# Patient Record
Sex: FEMALE | Race: WHITE | Employment: OTHER | URBAN - NONMETROPOLITAN AREA
[De-identification: names, ages, dates, MRNs, and addresses within clinical notes are randomized per-mention and may not be internally consistent; named-entity substitution may affect disease eponyms.]

---

## 2017-01-05 ENCOUNTER — TELEPHONE (OUTPATIENT)
Age: 72
End: 2017-01-05

## 2017-01-17 ENCOUNTER — OFFICE VISIT (OUTPATIENT)
Age: 72
End: 2017-01-17

## 2017-01-17 VITALS
TEMPERATURE: 98.1 F | HEART RATE: 72 BPM | HEIGHT: 62 IN | SYSTOLIC BLOOD PRESSURE: 124 MMHG | DIASTOLIC BLOOD PRESSURE: 78 MMHG | BODY MASS INDEX: 34.6 KG/M2 | WEIGHT: 188 LBS | RESPIRATION RATE: 18 BRPM

## 2017-01-17 DIAGNOSIS — Z12.11 COLON CANCER SCREENING: Primary | ICD-10-CM

## 2017-01-17 DIAGNOSIS — Z86.010 PERSONAL HISTORY OF COLONIC POLYPS: ICD-10-CM

## 2017-01-17 PROCEDURE — 99999 PR OFFICE/OUTPT VISIT,PROCEDURE ONLY: CPT | Performed by: SURGERY

## 2017-01-17 PROCEDURE — 1036F TOBACCO NON-USER: CPT | Performed by: SURGERY

## 2017-01-17 ASSESSMENT — ENCOUNTER SYMPTOMS
WHEEZING: 0
VOMITING: 0
RECTAL PAIN: 0
ANAL BLEEDING: 0
SHORTNESS OF BREATH: 0
COLOR CHANGE: 0
DIARRHEA: 0
CHOKING: 0
EYE DISCHARGE: 0
ABDOMINAL DISTENTION: 0
FACIAL SWELLING: 0
BLOOD IN STOOL: 0
CHEST TIGHTNESS: 0
EYE REDNESS: 0
BACK PAIN: 0
ABDOMINAL PAIN: 0
PHOTOPHOBIA: 0
APNEA: 0
CONSTIPATION: 0
STRIDOR: 0
EYE ITCHING: 0
EYE PAIN: 0
SINUS PRESSURE: 0
VOICE CHANGE: 0
SORE THROAT: 0
NAUSEA: 0
TROUBLE SWALLOWING: 0
RHINORRHEA: 0
COUGH: 0

## 2017-01-19 DIAGNOSIS — E78.00 PURE HYPERCHOLESTEROLEMIA: Primary | ICD-10-CM

## 2017-01-19 RX ORDER — ROSUVASTATIN CALCIUM 20 MG/1
TABLET, COATED ORAL
Qty: 90 TABLET | Refills: 0 | OUTPATIENT
Start: 2017-01-19

## 2017-01-27 ENCOUNTER — OFFICE VISIT (OUTPATIENT)
Dept: FAMILY MEDICINE CLINIC | Age: 72
End: 2017-01-27

## 2017-01-27 VITALS
HEART RATE: 68 BPM | DIASTOLIC BLOOD PRESSURE: 70 MMHG | HEIGHT: 62 IN | SYSTOLIC BLOOD PRESSURE: 128 MMHG | RESPIRATION RATE: 16 BRPM | WEIGHT: 186.13 LBS | BODY MASS INDEX: 34.25 KG/M2

## 2017-01-27 DIAGNOSIS — E78.00 PURE HYPERCHOLESTEROLEMIA: Primary | ICD-10-CM

## 2017-01-27 PROCEDURE — 99214 OFFICE O/P EST MOD 30 MIN: CPT | Performed by: FAMILY MEDICINE

## 2017-01-27 RX ORDER — ROSUVASTATIN CALCIUM 20 MG/1
TABLET, COATED ORAL
Qty: 90 TABLET | Refills: 3 | Status: SHIPPED | OUTPATIENT
Start: 2017-01-27 | End: 2018-01-22 | Stop reason: SDUPTHER

## 2017-01-27 ASSESSMENT — ENCOUNTER SYMPTOMS
SINUS PRESSURE: 0
CONSTIPATION: 0
SHORTNESS OF BREATH: 0

## 2017-01-27 ASSESSMENT — PATIENT HEALTH QUESTIONNAIRE - PHQ9
SUM OF ALL RESPONSES TO PHQ9 QUESTIONS 1 & 2: 0
1. LITTLE INTEREST OR PLEASURE IN DOING THINGS: 0
2. FEELING DOWN, DEPRESSED OR HOPELESS: 0
SUM OF ALL RESPONSES TO PHQ QUESTIONS 1-9: 0

## 2017-07-25 ENCOUNTER — TELEPHONE (OUTPATIENT)
Dept: FAMILY MEDICINE CLINIC | Age: 72
End: 2017-07-25

## 2017-07-25 DIAGNOSIS — E78.00 PURE HYPERCHOLESTEROLEMIA: ICD-10-CM

## 2017-07-25 DIAGNOSIS — E78.00 PURE HYPERCHOLESTEROLEMIA: Primary | ICD-10-CM

## 2017-07-25 LAB
CHOLESTEROL, TOTAL: NORMAL MG/DL
CHOLESTEROL/HDL RATIO: NORMAL
HDLC SERPL-MCNC: NORMAL MG/DL (ref 35–70)
LDL CHOLESTEROL CALCULATED: 75 MG/DL (ref 0–160)
TRIGL SERPL-MCNC: NORMAL MG/DL
VLDLC SERPL CALC-MCNC: NORMAL MG/DL

## 2018-01-11 DIAGNOSIS — E78.00 PURE HYPERCHOLESTEROLEMIA: ICD-10-CM

## 2018-01-11 LAB — LDL CHOLESTEROL DIRECT: 86 MG/DL

## 2018-01-15 ENCOUNTER — TELEPHONE (OUTPATIENT)
Dept: FAMILY MEDICINE CLINIC | Age: 73
End: 2018-01-15

## 2018-01-15 NOTE — TELEPHONE ENCOUNTER
----- Message from Antonio Otero MD sent at 1/14/2018  8:27 PM EST -----  Let her know that the labs looked well but I would like to see her this month still for a check up

## 2018-01-17 ENCOUNTER — HOSPITAL ENCOUNTER (OUTPATIENT)
Dept: WOMENS IMAGING | Age: 73
Discharge: HOME | End: 2018-01-17
Payer: MEDICARE

## 2018-01-17 DIAGNOSIS — Z12.31 VISIT FOR SCREENING MAMMOGRAM: ICD-10-CM

## 2018-01-17 PROCEDURE — 77063 BREAST TOMOSYNTHESIS BI: CPT

## 2018-01-22 ENCOUNTER — OFFICE VISIT (OUTPATIENT)
Dept: FAMILY MEDICINE CLINIC | Age: 73
End: 2018-01-22

## 2018-01-22 VITALS
RESPIRATION RATE: 16 BRPM | BODY MASS INDEX: 32.23 KG/M2 | SYSTOLIC BLOOD PRESSURE: 142 MMHG | HEART RATE: 68 BPM | HEIGHT: 62 IN | DIASTOLIC BLOOD PRESSURE: 72 MMHG | WEIGHT: 175.13 LBS

## 2018-01-22 DIAGNOSIS — Z78.0 POSTMENOPAUSAL: ICD-10-CM

## 2018-01-22 DIAGNOSIS — E78.00 PURE HYPERCHOLESTEROLEMIA: ICD-10-CM

## 2018-01-22 DIAGNOSIS — R00.2 PALPITATION: Primary | ICD-10-CM

## 2018-01-22 DIAGNOSIS — R55 SYNCOPE, UNSPECIFIED SYNCOPE TYPE: ICD-10-CM

## 2018-01-22 PROCEDURE — 99214 OFFICE O/P EST MOD 30 MIN: CPT | Performed by: FAMILY MEDICINE

## 2018-01-22 PROCEDURE — 93000 ELECTROCARDIOGRAM COMPLETE: CPT | Performed by: FAMILY MEDICINE

## 2018-01-22 RX ORDER — ROSUVASTATIN CALCIUM 20 MG/1
TABLET, COATED ORAL
Qty: 90 TABLET | Refills: 3 | OUTPATIENT
Start: 2018-01-22

## 2018-01-22 RX ORDER — ROSUVASTATIN CALCIUM 20 MG/1
TABLET, COATED ORAL
Qty: 90 TABLET | Refills: 3 | Status: SHIPPED | OUTPATIENT
Start: 2018-01-22 | End: 2018-12-06 | Stop reason: SDUPTHER

## 2018-01-22 ASSESSMENT — PATIENT HEALTH QUESTIONNAIRE - PHQ9
2. FEELING DOWN, DEPRESSED OR HOPELESS: 1
1. LITTLE INTEREST OR PLEASURE IN DOING THINGS: 1
SUM OF ALL RESPONSES TO PHQ QUESTIONS 1-9: 2
SUM OF ALL RESPONSES TO PHQ9 QUESTIONS 1 & 2: 2

## 2018-01-22 ASSESSMENT — ENCOUNTER SYMPTOMS
CONSTIPATION: 0
SHORTNESS OF BREATH: 0
SINUS PRESSURE: 0

## 2018-01-22 NOTE — PROGRESS NOTES
Subjective:      Patient ID: María Elena Lewis is a 71 y.o. female. HPI  1. She states having about the same number of palpitation feelings as in the last 17 years and will black out everyother month or so. 2. She is interested in seeing a cardiologist  Review of Systems   Constitutional: Negative for fatigue. HENT: Negative for sinus pressure. Eyes: Negative for visual disturbance. Respiratory: Negative for shortness of breath. Cardiovascular: Positive for palpitations. Negative for chest pain. Gastrointestinal: Negative for constipation. Genitourinary: Negative. Musculoskeletal: Positive for arthralgias. Skin: Negative for rash. Neurological: Positive for syncope. Negative for headaches. The patient's medications, allergies, past medical problems, surgical, social, and family histories were reviewed and updated as needed. Objective:   Physical Exam   Constitutional: She is oriented to person, place, and time. She appears well-developed and well-nourished. No distress. HENT:   Head: Normocephalic and atraumatic. Right Ear: External ear normal.   Left Ear: External ear normal.   Nose: Nose normal.   Mouth/Throat: Oropharynx is clear and moist. No oropharyngeal exudate. Eyes: Conjunctivae are normal. No scleral icterus. Neck: Neck supple. Carotid bruit is not present. No tracheal deviation present. No thyromegaly present. Cardiovascular: Normal rate, regular rhythm, normal heart sounds and intact distal pulses. Pulmonary/Chest: Effort normal and breath sounds normal.   Abdominal: Soft. Bowel sounds are normal. She exhibits no mass. Musculoskeletal: She exhibits no edema. Lymphadenopathy:     She has no cervical adenopathy. Neurological: She is alert and oriented to person, place, and time. Skin: Skin is warm and dry. Psychiatric: She has a normal mood and affect. Her behavior is normal.   Blood pressure (!) 142/72, pulse 68, resp.  rate 16, height 5' 2\" (1.575

## 2018-01-25 DIAGNOSIS — R00.2 PALPITATION: ICD-10-CM

## 2018-01-29 ENCOUNTER — TELEPHONE (OUTPATIENT)
Dept: FAMILY MEDICINE CLINIC | Age: 73
End: 2018-01-29

## 2018-01-29 NOTE — TELEPHONE ENCOUNTER
----- Message from Angela Vallejo MD sent at 1/27/2018  8:42 PM EST -----  Let her know the labs were fine

## 2018-02-01 ENCOUNTER — HOSPITAL ENCOUNTER (OUTPATIENT)
Dept: WOMENS IMAGING | Age: 73
Discharge: HOME | End: 2018-02-01
Payer: MEDICARE

## 2018-02-01 DIAGNOSIS — Z78.0 POSTMENOPAUSAL: ICD-10-CM

## 2018-02-01 PROCEDURE — 77080 DXA BONE DENSITY AXIAL: CPT

## 2018-02-18 DIAGNOSIS — M85.89 OSTEOPENIA OF MULTIPLE SITES: Primary | ICD-10-CM

## 2018-02-18 PROBLEM — M85.80 OSTEOPENIA: Status: ACTIVE | Noted: 2018-02-01

## 2018-02-19 ENCOUNTER — TELEPHONE (OUTPATIENT)
Dept: FAMILY MEDICINE CLINIC | Age: 73
End: 2018-02-19

## 2018-02-19 NOTE — TELEPHONE ENCOUNTER
Pt is in [facility] will be back 2 nd wk march will  lab slip and sched appt then lab slip at front

## 2018-03-06 DIAGNOSIS — M85.89 OSTEOPENIA OF MULTIPLE SITES: ICD-10-CM

## 2018-03-07 ENCOUNTER — HOSPITAL ENCOUNTER (OUTPATIENT)
Dept: NON INVASIVE DIAGNOSTICS | Age: 73
Discharge: HOME | End: 2018-03-07
Payer: MEDICARE

## 2018-03-07 DIAGNOSIS — R55 SYNCOPE, UNSPECIFIED SYNCOPE TYPE: ICD-10-CM

## 2018-03-07 LAB
LV EF: 50 %
LVEF MODALITY: NORMAL

## 2018-03-07 PROCEDURE — 93270 REMOTE 30 DAY ECG REV/REPORT: CPT

## 2018-03-07 PROCEDURE — 93226 XTRNL ECG REC<48 HR SCAN A/R: CPT

## 2018-03-07 PROCEDURE — 93225 XTRNL ECG REC<48 HRS REC: CPT

## 2018-03-07 PROCEDURE — 93306 TTE W/DOPPLER COMPLETE: CPT

## 2018-03-08 ENCOUNTER — OFFICE VISIT (OUTPATIENT)
Dept: FAMILY MEDICINE CLINIC | Age: 73
End: 2018-03-08

## 2018-03-08 VITALS
RESPIRATION RATE: 16 BRPM | HEIGHT: 62 IN | HEART RATE: 68 BPM | BODY MASS INDEX: 32.2 KG/M2 | DIASTOLIC BLOOD PRESSURE: 60 MMHG | WEIGHT: 175 LBS | SYSTOLIC BLOOD PRESSURE: 120 MMHG

## 2018-03-08 DIAGNOSIS — M85.89 OSTEOPENIA OF MULTIPLE SITES: Primary | ICD-10-CM

## 2018-03-08 DIAGNOSIS — E55.9 VITAMIN D DEFICIENCY: ICD-10-CM

## 2018-03-08 DIAGNOSIS — E78.00 PURE HYPERCHOLESTEROLEMIA: ICD-10-CM

## 2018-03-08 PROCEDURE — 99213 OFFICE O/P EST LOW 20 MIN: CPT | Performed by: FAMILY MEDICINE

## 2018-03-08 ASSESSMENT — ENCOUNTER SYMPTOMS
CONSTIPATION: 0
SHORTNESS OF BREATH: 0
SINUS PRESSURE: 0

## 2018-03-12 ENCOUNTER — TELEPHONE (OUTPATIENT)
Dept: FAMILY MEDICINE CLINIC | Age: 73
End: 2018-03-12

## 2018-03-20 ENCOUNTER — OFFICE VISIT (OUTPATIENT)
Dept: CARDIOLOGY CLINIC | Age: 73
End: 2018-03-20
Payer: MEDICARE

## 2018-03-20 VITALS
DIASTOLIC BLOOD PRESSURE: 68 MMHG | SYSTOLIC BLOOD PRESSURE: 120 MMHG | OXYGEN SATURATION: 95 % | BODY MASS INDEX: 31.54 KG/M2 | WEIGHT: 178 LBS | HEART RATE: 65 BPM | HEIGHT: 63 IN

## 2018-03-20 DIAGNOSIS — R00.2 HEART PALPITATIONS: Primary | ICD-10-CM

## 2018-03-20 DIAGNOSIS — R55 SYNCOPE, UNSPECIFIED SYNCOPE TYPE: ICD-10-CM

## 2018-03-20 PROCEDURE — G8417 CALC BMI ABV UP PARAM F/U: HCPCS | Performed by: INTERNAL MEDICINE

## 2018-03-20 PROCEDURE — 93000 ELECTROCARDIOGRAM COMPLETE: CPT | Performed by: INTERNAL MEDICINE

## 2018-03-20 PROCEDURE — G8484 FLU IMMUNIZE NO ADMIN: HCPCS | Performed by: INTERNAL MEDICINE

## 2018-03-20 PROCEDURE — 4040F PNEUMOC VAC/ADMIN/RCVD: CPT | Performed by: INTERNAL MEDICINE

## 2018-03-20 PROCEDURE — 99204 OFFICE O/P NEW MOD 45 MIN: CPT | Performed by: INTERNAL MEDICINE

## 2018-03-20 PROCEDURE — G8399 PT W/DXA RESULTS DOCUMENT: HCPCS | Performed by: INTERNAL MEDICINE

## 2018-03-20 PROCEDURE — 3014F SCREEN MAMMO DOC REV: CPT | Performed by: INTERNAL MEDICINE

## 2018-03-20 PROCEDURE — 1123F ACP DISCUSS/DSCN MKR DOCD: CPT | Performed by: INTERNAL MEDICINE

## 2018-03-20 PROCEDURE — 3017F COLORECTAL CA SCREEN DOC REV: CPT | Performed by: INTERNAL MEDICINE

## 2018-03-20 PROCEDURE — 1090F PRES/ABSN URINE INCON ASSESS: CPT | Performed by: INTERNAL MEDICINE

## 2018-03-20 PROCEDURE — G8427 DOCREV CUR MEDS BY ELIG CLIN: HCPCS | Performed by: INTERNAL MEDICINE

## 2018-03-20 PROCEDURE — 1036F TOBACCO NON-USER: CPT | Performed by: INTERNAL MEDICINE

## 2018-03-20 ASSESSMENT — ENCOUNTER SYMPTOMS
DOUBLE VISION: 0
BACK PAIN: 0
SHORTNESS OF BREATH: 0
RIGHT EYE: 0
BLURRED VISION: 0
CONSTIPATION: 0
LEFT EYE: 0
SORE THROAT: 0
VOMITING: 0
DIARRHEA: 0
COUGH: 0
ABDOMINAL PAIN: 0
NAUSEA: 0
WHEEZING: 0

## 2018-03-20 NOTE — PROGRESS NOTES
[facility] [address] [place] [address], [address]  Dept: [phone]  Dept Fax: [phone]      CARDIAC ELECTROPHYSIOLOGY  CONSULTATION    3/20/2018      REFERRING PROVIDER:  Dr. Stuart German:  I am having episodes of blacking out. Chief Complaint   Patient presents with    New Patient       HPI:  HPI    03/20/2018: The patient is a 70 y/o female that was diagnosed with mitral valve prolapse 25 years ago. She states she also has a history of rapid palpitations. She states the episodes were random in frequency and there were no specific factors that would trigger an episode. The patient states the episodes normally would last for only a few minutes and stop spontaneously. The patient states if they did not stop spontaneously she could perform a vagal maneuver and this would stop the episodes. The patient states the longest episode she ever had was two years ago and it lasted for two hours. The patient never had any episodes of associated loss of consciousness. The patient reports having a new problem that began approximately two years ago. She reports feeling a \"sensation\" in her heart and then she would \"black out for a few seconds\" afterwards. The patient states the sensation in her chest was different from the palpitations she has been suffering from for over twenty years. The patient states there are no specific factors that will exacerbate the episodes. She does report having enough time to pull over and stop the car when they occur while she is driving. She has never had loss of postural tone with the episodes. The patient states the episodes are becoming more frequent and occur once a month to every other month. She had an ECHO performed which was unremarkable. She is wearing a cardiac event monitor currently. She has two more weeks left of the 30 days. She has not had an episode so far.   The patient is being referred for further evaluation 01/24/2017     01/24/2017    K 4.6 01/24/2017     01/24/2017    CREATININE 0.8 01/24/2017    BUN 13 01/24/2017    CO2 29 01/24/2017          IMPRESSION:  Syncope: The patient is a 72 y/o female that is having episodes of a \"sensation\" in her heart and this is followed by a very brief episodes of vision loss. She has not had true syncope since she has not had loss of postural tone. The episodes do not appear to be related to changes in position. At this time I am unable to diagnosis the etiology for the patient's episodes. I did explain to her that it is important that she is wearing the monitor when she has an episode. If this does not happen with the current 30 day event recorder, the next option would be to repeat the 30 day event monitor or to implant a loop recorder. I also explained to the patient that she may not drive since she is having loss of vision. I explained to the patient the serious danger of driving and the guidelines regarding driving until a diagnosis and treatment is made. The patient understood the information and agrees to the plan of care. PLAN:  1. Check carotid ultrasound. 2. Complete 30 day cardiac event monitor. 3. No driving until a diagnosis and treatment. 4. F/U after completion of cardiac monitor.          Electronically signed by Sammi Golden MD on 3/20/2018 at 9:12 AM

## 2018-03-20 NOTE — PROGRESS NOTES
New pt referral from DR Stacia Downs for palpitations and syncope. She is currently wearing a monitor  Pt says this all started 2 yrs ago.  She said she did have it prior but the blacking out got worse   She says she does have an MVP  She just had an echo one month ago  She does have palpitations  Denies any sob, lt headed or dizziness and no edema

## 2018-03-23 ENCOUNTER — HOSPITAL ENCOUNTER (OUTPATIENT)
Dept: INTERVENTIONAL RADIOLOGY/VASCULAR | Age: 73
Discharge: HOME | End: 2018-03-23
Payer: MEDICARE

## 2018-03-23 ENCOUNTER — TELEPHONE (OUTPATIENT)
Dept: FAMILY MEDICINE CLINIC | Age: 73
End: 2018-03-23

## 2018-03-23 ENCOUNTER — TELEPHONE (OUTPATIENT)
Dept: CARDIOLOGY CLINIC | Age: 73
End: 2018-03-23

## 2018-03-23 DIAGNOSIS — R55 SYNCOPE, UNSPECIFIED SYNCOPE TYPE: ICD-10-CM

## 2018-03-23 PROCEDURE — 93880 EXTRACRANIAL BILAT STUDY: CPT

## 2018-03-26 ENCOUNTER — TELEPHONE (OUTPATIENT)
Dept: CARDIOLOGY CLINIC | Age: 73
End: 2018-03-26

## 2018-03-26 NOTE — TELEPHONE ENCOUNTER
Keyla informed and she said that the Dr Isaias Wellington is out all week. She will send a message to him. Spoke with Dr Ivette Bansal and he said that someone else can see her. Keyla changed the appointment and got her in with Dr Olga Shoemaker at 130 pm. Briana Nazario will inform her.

## 2018-03-29 ENCOUNTER — TELEPHONE (OUTPATIENT)
Dept: CARDIOLOGY CLINIC | Age: 73
End: 2018-03-29

## 2018-03-29 ENCOUNTER — OFFICE VISIT (OUTPATIENT)
Dept: CARDIOLOGY CLINIC | Age: 73
End: 2018-03-29
Payer: MEDICARE

## 2018-03-29 VITALS
BODY MASS INDEX: 31.36 KG/M2 | HEIGHT: 63 IN | DIASTOLIC BLOOD PRESSURE: 64 MMHG | SYSTOLIC BLOOD PRESSURE: 116 MMHG | HEART RATE: 68 BPM | WEIGHT: 177 LBS

## 2018-03-29 DIAGNOSIS — R00.2 PALPITATIONS: ICD-10-CM

## 2018-03-29 DIAGNOSIS — R94.31 ABNORMAL EKG: ICD-10-CM

## 2018-03-29 DIAGNOSIS — I48.91 NEW ONSET A-FIB (HCC): ICD-10-CM

## 2018-03-29 DIAGNOSIS — I48.0 PAF (PAROXYSMAL ATRIAL FIBRILLATION) (HCC): Primary | ICD-10-CM

## 2018-03-29 DIAGNOSIS — E78.00 PURE HYPERCHOLESTEROLEMIA: ICD-10-CM

## 2018-03-29 PROCEDURE — 1090F PRES/ABSN URINE INCON ASSESS: CPT | Performed by: INTERNAL MEDICINE

## 2018-03-29 PROCEDURE — G8417 CALC BMI ABV UP PARAM F/U: HCPCS | Performed by: INTERNAL MEDICINE

## 2018-03-29 PROCEDURE — 3017F COLORECTAL CA SCREEN DOC REV: CPT | Performed by: INTERNAL MEDICINE

## 2018-03-29 PROCEDURE — 4040F PNEUMOC VAC/ADMIN/RCVD: CPT | Performed by: INTERNAL MEDICINE

## 2018-03-29 PROCEDURE — G8399 PT W/DXA RESULTS DOCUMENT: HCPCS | Performed by: INTERNAL MEDICINE

## 2018-03-29 PROCEDURE — G8427 DOCREV CUR MEDS BY ELIG CLIN: HCPCS | Performed by: INTERNAL MEDICINE

## 2018-03-29 PROCEDURE — 3014F SCREEN MAMMO DOC REV: CPT | Performed by: INTERNAL MEDICINE

## 2018-03-29 PROCEDURE — 99214 OFFICE O/P EST MOD 30 MIN: CPT | Performed by: INTERNAL MEDICINE

## 2018-03-29 PROCEDURE — 1036F TOBACCO NON-USER: CPT | Performed by: INTERNAL MEDICINE

## 2018-03-29 PROCEDURE — 1123F ACP DISCUSS/DSCN MKR DOCD: CPT | Performed by: INTERNAL MEDICINE

## 2018-03-29 PROCEDURE — G8484 FLU IMMUNIZE NO ADMIN: HCPCS | Performed by: INTERNAL MEDICINE

## 2018-03-29 NOTE — PROGRESS NOTES
Chief Complaint   Patient presents with    Check-Up     abnormal monitor strips     Patient is here due to abnormal monitor strips. Seen by dr. lEdon Fountain for palpitation and event show some abnormal Rhythm and hence came to see me here -Dr. Eldon Fountain out today  Dr. Cl Barrett office called would like patient seen for abnormal monitor strips, notified Dr. Karina Dennis out this week would like another physician to address. Appt made for 3-27-18, patient declined she will come Thursday    Pat still has palpitation and  Feel like dark vision for few seconds  No dizziness  Never had syncope      Denies cp and  sob,       C/o palpitations, seeing black for about 5 seconds. Patient Active Problem List   Diagnosis    Pure hypercholesterolemia    Adenomatous colon polyp    Vitamin D deficiency    Osteopenia of multiple sites    Palpitations    PAF (paroxysmal atrial fibrillation) ([facility] [address]- noted on event monitor       Past Surgical History:   Procedure Laterality Date    COLONOSCOPY  2011    Wisser    COLONOSCOPY  2006    Wisser    COLONOSCOPY  01/25/2017    Dr. Urmila Goldsmith       No Known Allergies     Family History   Problem Relation Age of Onset    Stroke Mother     Heart Disease Mother     Cancer Sister         Social History     Social History    Marital status:      Spouse name: N/A    Number of children: N/A    Years of education: N/A     Occupational History    Not on file.      Social History Main Topics    Smoking status: Former Smoker     Packs/day: 0.50     Years: 4.00     Types: Cigarettes     Quit date: 1968    Smokeless tobacco: Never Used    Alcohol use Yes      Comment: occsional 2-3 times a month    Drug use: No    Sexual activity: Not on file     Other Topics Concern    Not on file     Social History Narrative    No narrative on file       Current Outpatient Prescriptions   Medication Sig Dispense Refill    Cholecalciferol

## 2018-04-10 NOTE — PROCEDURES
[address] [place] [address]                                   EVENT MONITOR    PATIENT NAME: Glo Jang                :        1945  MED REC NO:   [id number]                           ROOM:  ACCOUNT NO:   [de-identified]                           ADMIT DATE: 2018  PROVIDER:     Gwen Jackson M.D.    TEST TYPE:  Event monitor. CLINICAL HISTORY AND INDICATION:  This is a patient with syncope. EVENT MONITOR DESCRIPTION:  Event monitor was attached between 2018  and 2018. EVENT MONITOR FINDINGS:  Baseline rhythm showed sinus rhythm. The patient  had two episodes of atrial fibrillation lasting for only few seconds. This  happened for four different events, the longest of which was about 10. Otherwise, no sustained arrhythmia. CONCLUSION:  1. Sinus rhythm. 2.  Few episodes of nonsustained narrow complex tachycardia, possibly  atrial fibrillation of only few beats. 3.  Otherwise no sustained arrhythmias. 4.  Clinical correlation is recommended.         Jose Mcpherson M.D.    D: 2018 16:16:56       T: 2018 17:29:18     [email or web address]  Job#: [id number]     Doc#: [id number]    CC:

## 2018-04-18 ENCOUNTER — HOSPITAL ENCOUNTER (OUTPATIENT)
Dept: NON INVASIVE DIAGNOSTICS | Age: 73
Discharge: HOME | End: 2018-04-18
Payer: MEDICARE

## 2018-04-18 VITALS — WEIGHT: 174 LBS | HEIGHT: 63 IN | BODY MASS INDEX: 30.83 KG/M2

## 2018-04-18 DIAGNOSIS — I48.0 PAF (PAROXYSMAL ATRIAL FIBRILLATION) (HCC): ICD-10-CM

## 2018-04-18 DIAGNOSIS — I48.91 NEW ONSET A-FIB (HCC): ICD-10-CM

## 2018-04-18 DIAGNOSIS — R00.2 PALPITATIONS: ICD-10-CM

## 2018-04-18 DIAGNOSIS — E78.00 PURE HYPERCHOLESTEROLEMIA: ICD-10-CM

## 2018-04-18 DIAGNOSIS — R94.31 ABNORMAL EKG: ICD-10-CM

## 2018-04-18 PROCEDURE — A9500 TC99M SESTAMIBI: HCPCS | Performed by: INTERNAL MEDICINE

## 2018-04-18 PROCEDURE — 3430000000 HC RX DIAGNOSTIC RADIOPHARMACEUTICAL: Performed by: INTERNAL MEDICINE

## 2018-04-18 PROCEDURE — 93017 CV STRESS TEST TRACING ONLY: CPT

## 2018-04-18 PROCEDURE — 78452 HT MUSCLE IMAGE SPECT MULT: CPT

## 2018-04-18 PROCEDURE — 6360000002 HC RX W HCPCS

## 2018-04-18 RX ADMIN — Medication 34.9 MILLICURIE: at 08:52

## 2018-04-18 RX ADMIN — Medication 9.8 MILLICURIE: at 07:51

## 2018-04-20 ENCOUNTER — OFFICE VISIT (OUTPATIENT)
Dept: CARDIOLOGY CLINIC | Age: 73
End: 2018-04-20
Payer: MEDICARE

## 2018-04-20 VITALS
HEART RATE: 54 BPM | WEIGHT: 178.2 LBS | SYSTOLIC BLOOD PRESSURE: 134 MMHG | BODY MASS INDEX: 31.57 KG/M2 | HEIGHT: 63 IN | DIASTOLIC BLOOD PRESSURE: 81 MMHG

## 2018-04-20 DIAGNOSIS — R93.1 ABNORMAL NUCLEAR CARDIAC IMAGING TEST: ICD-10-CM

## 2018-04-20 DIAGNOSIS — R00.2 PALPITATIONS: ICD-10-CM

## 2018-04-20 DIAGNOSIS — I48.0 PAF (PAROXYSMAL ATRIAL FIBRILLATION) (HCC): ICD-10-CM

## 2018-04-20 DIAGNOSIS — E78.00 PURE HYPERCHOLESTEROLEMIA: ICD-10-CM

## 2018-04-20 DIAGNOSIS — R42 DIZZINESS: Primary | ICD-10-CM

## 2018-04-20 PROCEDURE — 1090F PRES/ABSN URINE INCON ASSESS: CPT | Performed by: INTERNAL MEDICINE

## 2018-04-20 PROCEDURE — G8399 PT W/DXA RESULTS DOCUMENT: HCPCS | Performed by: INTERNAL MEDICINE

## 2018-04-20 PROCEDURE — 3014F SCREEN MAMMO DOC REV: CPT | Performed by: INTERNAL MEDICINE

## 2018-04-20 PROCEDURE — 1123F ACP DISCUSS/DSCN MKR DOCD: CPT | Performed by: INTERNAL MEDICINE

## 2018-04-20 PROCEDURE — 4040F PNEUMOC VAC/ADMIN/RCVD: CPT | Performed by: INTERNAL MEDICINE

## 2018-04-20 PROCEDURE — G8427 DOCREV CUR MEDS BY ELIG CLIN: HCPCS | Performed by: INTERNAL MEDICINE

## 2018-04-20 PROCEDURE — G8417 CALC BMI ABV UP PARAM F/U: HCPCS | Performed by: INTERNAL MEDICINE

## 2018-04-20 PROCEDURE — 1036F TOBACCO NON-USER: CPT | Performed by: INTERNAL MEDICINE

## 2018-04-20 PROCEDURE — 3017F COLORECTAL CA SCREEN DOC REV: CPT | Performed by: INTERNAL MEDICINE

## 2018-04-20 PROCEDURE — 99213 OFFICE O/P EST LOW 20 MIN: CPT | Performed by: INTERNAL MEDICINE

## 2018-04-25 ENCOUNTER — OFFICE VISIT (OUTPATIENT)
Dept: CARDIOLOGY CLINIC | Age: 73
End: 2018-04-25
Payer: MEDICARE

## 2018-04-25 VITALS
WEIGHT: 175 LBS | BODY MASS INDEX: 31.01 KG/M2 | DIASTOLIC BLOOD PRESSURE: 79 MMHG | HEIGHT: 63 IN | SYSTOLIC BLOOD PRESSURE: 131 MMHG | HEART RATE: 71 BPM | RESPIRATION RATE: 16 BRPM

## 2018-04-25 DIAGNOSIS — I48.0 PAF (PAROXYSMAL ATRIAL FIBRILLATION) (HCC): Primary | ICD-10-CM

## 2018-04-25 PROCEDURE — G8399 PT W/DXA RESULTS DOCUMENT: HCPCS | Performed by: INTERNAL MEDICINE

## 2018-04-25 PROCEDURE — 1036F TOBACCO NON-USER: CPT | Performed by: INTERNAL MEDICINE

## 2018-04-25 PROCEDURE — 3017F COLORECTAL CA SCREEN DOC REV: CPT | Performed by: INTERNAL MEDICINE

## 2018-04-25 PROCEDURE — 1123F ACP DISCUSS/DSCN MKR DOCD: CPT | Performed by: INTERNAL MEDICINE

## 2018-04-25 PROCEDURE — 1090F PRES/ABSN URINE INCON ASSESS: CPT | Performed by: INTERNAL MEDICINE

## 2018-04-25 PROCEDURE — G8427 DOCREV CUR MEDS BY ELIG CLIN: HCPCS | Performed by: INTERNAL MEDICINE

## 2018-04-25 PROCEDURE — 4040F PNEUMOC VAC/ADMIN/RCVD: CPT | Performed by: INTERNAL MEDICINE

## 2018-04-25 PROCEDURE — G8417 CALC BMI ABV UP PARAM F/U: HCPCS | Performed by: INTERNAL MEDICINE

## 2018-04-25 PROCEDURE — 93000 ELECTROCARDIOGRAM COMPLETE: CPT | Performed by: INTERNAL MEDICINE

## 2018-04-25 PROCEDURE — 99213 OFFICE O/P EST LOW 20 MIN: CPT | Performed by: INTERNAL MEDICINE

## 2018-04-25 ASSESSMENT — ENCOUNTER SYMPTOMS
ABDOMINAL PAIN: 0
RIGHT EYE: 0
VOMITING: 0
SHORTNESS OF BREATH: 0
BACK PAIN: 0
NAUSEA: 0
BLURRED VISION: 0
WHEEZING: 0
COUGH: 0
SORE THROAT: 0
CONSTIPATION: 0
DOUBLE VISION: 0
LEFT EYE: 0
DIARRHEA: 0

## 2018-04-30 ENCOUNTER — TELEPHONE (OUTPATIENT)
Dept: CARDIOLOGY CLINIC | Age: 73
End: 2018-04-30

## 2018-05-08 ENCOUNTER — PREP FOR PROCEDURE (OUTPATIENT)
Dept: CARDIOLOGY | Age: 73
End: 2018-05-08

## 2018-05-08 RX ORDER — SODIUM CHLORIDE 0.9 % (FLUSH) 0.9 %
10 SYRINGE (ML) INJECTION PRN
Status: CANCELLED | OUTPATIENT
Start: 2018-05-08

## 2018-05-08 RX ORDER — NITROGLYCERIN 0.4 MG/1
0.4 TABLET SUBLINGUAL EVERY 5 MIN PRN
Status: CANCELLED | OUTPATIENT
Start: 2018-05-08

## 2018-05-08 RX ORDER — SODIUM CHLORIDE 9 MG/ML
INJECTION, SOLUTION INTRAVENOUS CONTINUOUS
Status: CANCELLED | OUTPATIENT
Start: 2018-05-08

## 2018-05-08 RX ORDER — DIPHENHYDRAMINE HYDROCHLORIDE 50 MG/ML
50 INJECTION INTRAMUSCULAR; INTRAVENOUS ONCE
Status: CANCELLED | OUTPATIENT
Start: 2018-05-08 | End: 2018-05-08

## 2018-05-08 RX ORDER — ASPIRIN 325 MG
325 TABLET ORAL ONCE
Status: CANCELLED | OUTPATIENT
Start: 2018-05-08 | End: 2018-05-08

## 2018-05-08 RX ORDER — SODIUM CHLORIDE 0.9 % (FLUSH) 0.9 %
10 SYRINGE (ML) INJECTION EVERY 12 HOURS SCHEDULED
Status: CANCELLED | OUTPATIENT
Start: 2018-05-08

## 2018-05-09 ENCOUNTER — APPOINTMENT (OUTPATIENT)
Dept: CARDIAC CATH/INVASIVE PROCEDURES | Age: 73
End: 2018-05-09
Attending: INTERNAL MEDICINE
Payer: MEDICARE

## 2018-05-09 ENCOUNTER — HOSPITAL ENCOUNTER (OUTPATIENT)
Dept: INPATIENT UNIT | Age: 73
Discharge: HOME | End: 2018-05-09
Attending: INTERNAL MEDICINE | Admitting: INTERNAL MEDICINE
Payer: MEDICARE

## 2018-05-09 VITALS
SYSTOLIC BLOOD PRESSURE: 105 MMHG | HEIGHT: 63 IN | DIASTOLIC BLOOD PRESSURE: 73 MMHG | RESPIRATION RATE: 16 BRPM | HEART RATE: 61 BPM | TEMPERATURE: 97.7 F | WEIGHT: 175 LBS | OXYGEN SATURATION: 97 % | BODY MASS INDEX: 31.01 KG/M2

## 2018-05-09 PROBLEM — Z98.890 S/P CARDIAC CATH: Status: ACTIVE | Noted: 2018-05-09

## 2018-05-09 LAB
ABO: NORMAL
ANION GAP SERPL CALCULATED.3IONS-SCNC: 10 MEQ/L (ref 8–16)
ANTIBODY SCREEN: NORMAL
APTT: 26 SECONDS (ref 22–38)
BUN BLDV-MCNC: 16 MG/DL (ref 7–22)
CALCIUM SERPL-MCNC: 9.1 MG/DL (ref 8.5–10.5)
CHLORIDE BLD-SCNC: 102 MEQ/L (ref 98–111)
CO2: 27 MEQ/L (ref 23–33)
CREAT SERPL-MCNC: 0.8 MG/DL (ref 0.4–1.2)
EKG ATRIAL RATE: 56 BPM
EKG P AXIS: 6 DEGREES
EKG P-R INTERVAL: 136 MS
EKG Q-T INTERVAL: 436 MS
EKG QRS DURATION: 70 MS
EKG QTC CALCULATION (BAZETT): 420 MS
EKG R AXIS: 4 DEGREES
EKG T AXIS: 20 DEGREES
EKG VENTRICULAR RATE: 56 BPM
GFR SERPL CREATININE-BSD FRML MDRD: 71 ML/MIN/1.73M2
GLUCOSE BLD-MCNC: 97 MG/DL (ref 70–108)
HCT VFR BLD CALC: 39.8 % (ref 37–47)
HEMOGLOBIN: 13.5 GM/DL (ref 12–16)
INR BLD: 0.96 (ref 0.85–1.13)
MCH RBC QN AUTO: 29 PG (ref 27–31)
MCHC RBC AUTO-ENTMCNC: 34 GM/DL (ref 33–37)
MCV RBC AUTO: 85.1 FL (ref 81–99)
PDW BLD-RTO: 12.8 % (ref 11.5–14.5)
PLATELET # BLD: 263 THOU/MM3 (ref 130–400)
PMV BLD AUTO: 8.3 FL (ref 7.4–10.4)
POTASSIUM REFLEX MAGNESIUM: 3.9 MEQ/L (ref 3.5–5.2)
RBC # BLD: 4.67 MILL/MM3 (ref 4.2–5.4)
RH FACTOR: NORMAL
SODIUM BLD-SCNC: 139 MEQ/L (ref 135–145)
WBC # BLD: 6.9 THOU/MM3 (ref 4.8–10.8)

## 2018-05-09 PROCEDURE — 93458 L HRT ARTERY/VENTRICLE ANGIO: CPT | Performed by: INTERNAL MEDICINE

## 2018-05-09 PROCEDURE — 6370000000 HC RX 637 (ALT 250 FOR IP): Performed by: NURSE PRACTITIONER

## 2018-05-09 PROCEDURE — 80048 BASIC METABOLIC PNL TOTAL CA: CPT

## 2018-05-09 PROCEDURE — 36415 COLL VENOUS BLD VENIPUNCTURE: CPT

## 2018-05-09 PROCEDURE — 2500000003 HC RX 250 WO HCPCS

## 2018-05-09 PROCEDURE — 86901 BLOOD TYPING SEROLOGIC RH(D): CPT

## 2018-05-09 PROCEDURE — 6360000002 HC RX W HCPCS

## 2018-05-09 PROCEDURE — C1725 CATH, TRANSLUMIN NON-LASER: HCPCS

## 2018-05-09 PROCEDURE — C1894 INTRO/SHEATH, NON-LASER: HCPCS

## 2018-05-09 PROCEDURE — 2580000003 HC RX 258: Performed by: NURSE PRACTITIONER

## 2018-05-09 PROCEDURE — 2780000010 HC IMPLANT OTHER

## 2018-05-09 PROCEDURE — 85610 PROTHROMBIN TIME: CPT

## 2018-05-09 PROCEDURE — C1769 GUIDE WIRE: HCPCS

## 2018-05-09 PROCEDURE — 86900 BLOOD TYPING SEROLOGIC ABO: CPT

## 2018-05-09 PROCEDURE — 86850 RBC ANTIBODY SCREEN: CPT

## 2018-05-09 PROCEDURE — 85027 COMPLETE CBC AUTOMATED: CPT

## 2018-05-09 PROCEDURE — 93005 ELECTROCARDIOGRAM TRACING: CPT | Performed by: NURSE PRACTITIONER

## 2018-05-09 PROCEDURE — 85730 THROMBOPLASTIN TIME PARTIAL: CPT

## 2018-05-09 RX ORDER — SODIUM CHLORIDE 0.9 % (FLUSH) 0.9 %
10 SYRINGE (ML) INJECTION EVERY 12 HOURS SCHEDULED
Status: DISCONTINUED | OUTPATIENT
Start: 2018-05-09 | End: 2018-05-09 | Stop reason: HOSPADM

## 2018-05-09 RX ORDER — SODIUM CHLORIDE 0.9 % (FLUSH) 0.9 %
10 SYRINGE (ML) INJECTION PRN
Status: DISCONTINUED | OUTPATIENT
Start: 2018-05-09 | End: 2018-05-09 | Stop reason: HOSPADM

## 2018-05-09 RX ORDER — NITROGLYCERIN 0.4 MG/1
0.4 TABLET SUBLINGUAL EVERY 5 MIN PRN
Status: DISCONTINUED | OUTPATIENT
Start: 2018-05-09 | End: 2018-05-09 | Stop reason: HOSPADM

## 2018-05-09 RX ORDER — ONDANSETRON 2 MG/ML
4 INJECTION INTRAMUSCULAR; INTRAVENOUS EVERY 6 HOURS PRN
Status: DISCONTINUED | OUTPATIENT
Start: 2018-05-09 | End: 2018-05-09 | Stop reason: HOSPADM

## 2018-05-09 RX ORDER — ACETAMINOPHEN 325 MG/1
650 TABLET ORAL EVERY 4 HOURS PRN
Status: DISCONTINUED | OUTPATIENT
Start: 2018-05-09 | End: 2018-05-09 | Stop reason: HOSPADM

## 2018-05-09 RX ORDER — SODIUM CHLORIDE 9 MG/ML
INJECTION, SOLUTION INTRAVENOUS CONTINUOUS
Status: DISCONTINUED | OUTPATIENT
Start: 2018-05-09 | End: 2018-05-09 | Stop reason: HOSPADM

## 2018-05-09 RX ORDER — ASPIRIN 325 MG
325 TABLET ORAL ONCE
Status: COMPLETED | OUTPATIENT
Start: 2018-05-09 | End: 2018-05-09

## 2018-05-09 RX ADMIN — ASPIRIN 325 MG: 325 TABLET, COATED ORAL at 09:45

## 2018-05-09 RX ADMIN — SODIUM CHLORIDE: 9 INJECTION, SOLUTION INTRAVENOUS at 09:38

## 2018-05-09 ASSESSMENT — PAIN SCALES - GENERAL
PAINLEVEL_OUTOF10: 0

## 2018-06-05 ENCOUNTER — OFFICE VISIT (OUTPATIENT)
Dept: CARDIOLOGY CLINIC | Age: 73
End: 2018-06-05
Payer: MEDICARE

## 2018-06-05 VITALS
BODY MASS INDEX: 30.79 KG/M2 | SYSTOLIC BLOOD PRESSURE: 108 MMHG | DIASTOLIC BLOOD PRESSURE: 60 MMHG | HEART RATE: 60 BPM | WEIGHT: 173.8 LBS | HEIGHT: 63 IN

## 2018-06-05 DIAGNOSIS — R00.2 PALPITATIONS: ICD-10-CM

## 2018-06-05 DIAGNOSIS — E78.00 PURE HYPERCHOLESTEROLEMIA: ICD-10-CM

## 2018-06-05 DIAGNOSIS — Z98.890 S/P CARDIAC CATH: Primary | ICD-10-CM

## 2018-06-05 DIAGNOSIS — I48.0 PAF (PAROXYSMAL ATRIAL FIBRILLATION) (HCC): ICD-10-CM

## 2018-06-05 PROBLEM — R42 DIZZINESS: Status: RESOLVED | Noted: 2018-04-20 | Resolved: 2018-06-05

## 2018-06-05 PROBLEM — R93.1 ABNORMAL NUCLEAR CARDIAC IMAGING TEST: Status: RESOLVED | Noted: 2018-04-20 | Resolved: 2018-06-05

## 2018-06-05 PROCEDURE — G8399 PT W/DXA RESULTS DOCUMENT: HCPCS | Performed by: INTERNAL MEDICINE

## 2018-06-05 PROCEDURE — 99213 OFFICE O/P EST LOW 20 MIN: CPT | Performed by: INTERNAL MEDICINE

## 2018-06-05 PROCEDURE — 1090F PRES/ABSN URINE INCON ASSESS: CPT | Performed by: INTERNAL MEDICINE

## 2018-06-05 PROCEDURE — G8417 CALC BMI ABV UP PARAM F/U: HCPCS | Performed by: INTERNAL MEDICINE

## 2018-06-05 PROCEDURE — 1123F ACP DISCUSS/DSCN MKR DOCD: CPT | Performed by: INTERNAL MEDICINE

## 2018-06-05 PROCEDURE — 1036F TOBACCO NON-USER: CPT | Performed by: INTERNAL MEDICINE

## 2018-06-05 PROCEDURE — G8427 DOCREV CUR MEDS BY ELIG CLIN: HCPCS | Performed by: INTERNAL MEDICINE

## 2018-06-05 PROCEDURE — 4040F PNEUMOC VAC/ADMIN/RCVD: CPT | Performed by: INTERNAL MEDICINE

## 2018-06-05 PROCEDURE — 3017F COLORECTAL CA SCREEN DOC REV: CPT | Performed by: INTERNAL MEDICINE

## 2018-09-07 ENCOUNTER — HOSPITAL ENCOUNTER (OUTPATIENT)
Age: 73
Discharge: HOME | End: 2018-09-07
Payer: MEDICARE

## 2018-09-07 ENCOUNTER — TELEPHONE (OUTPATIENT)
Dept: FAMILY MEDICINE CLINIC | Age: 73
End: 2018-09-07

## 2018-09-07 DIAGNOSIS — E55.9 VITAMIN D DEFICIENCY: ICD-10-CM

## 2018-09-07 DIAGNOSIS — E78.00 PURE HYPERCHOLESTEROLEMIA: Primary | ICD-10-CM

## 2018-09-07 DIAGNOSIS — E78.00 PURE HYPERCHOLESTEROLEMIA: ICD-10-CM

## 2018-09-07 LAB
LDL CHOLESTEROL DIRECT: 87.93 MG/DL
VITAMIN D 25-HYDROXY: 54 NG/ML (ref 30–100)

## 2018-09-07 PROCEDURE — 36415 COLL VENOUS BLD VENIPUNCTURE: CPT

## 2018-09-07 PROCEDURE — 82306 VITAMIN D 25 HYDROXY: CPT

## 2018-09-07 PROCEDURE — 83721 ASSAY OF BLOOD LIPOPROTEIN: CPT

## 2018-12-04 RX ORDER — APIXABAN 5 MG/1
TABLET, FILM COATED ORAL
Qty: 180 TABLET | Refills: 0 | Status: SHIPPED | OUTPATIENT
Start: 2018-12-04 | End: 2018-12-06 | Stop reason: SDUPTHER

## 2018-12-06 ENCOUNTER — OFFICE VISIT (OUTPATIENT)
Dept: CARDIOLOGY CLINIC | Age: 73
End: 2018-12-06
Payer: MEDICARE

## 2018-12-06 VITALS
BODY MASS INDEX: 33.13 KG/M2 | DIASTOLIC BLOOD PRESSURE: 78 MMHG | HEIGHT: 62 IN | SYSTOLIC BLOOD PRESSURE: 128 MMHG | HEART RATE: 64 BPM | WEIGHT: 180 LBS

## 2018-12-06 DIAGNOSIS — H81.11 BPV (BENIGN POSITIONAL VERTIGO), RIGHT: ICD-10-CM

## 2018-12-06 DIAGNOSIS — E78.00 PURE HYPERCHOLESTEROLEMIA: ICD-10-CM

## 2018-12-06 DIAGNOSIS — R42 DIZZINESS: ICD-10-CM

## 2018-12-06 DIAGNOSIS — Z98.890 S/P CARDIAC CATH: ICD-10-CM

## 2018-12-06 DIAGNOSIS — I48.0 PAF (PAROXYSMAL ATRIAL FIBRILLATION) (HCC): Primary | ICD-10-CM

## 2018-12-06 DIAGNOSIS — R00.2 PALPITATIONS: ICD-10-CM

## 2018-12-06 PROBLEM — E78.5 DYSLIPIDEMIA: Status: ACTIVE | Noted: 2018-12-06

## 2018-12-06 PROCEDURE — 1123F ACP DISCUSS/DSCN MKR DOCD: CPT | Performed by: INTERNAL MEDICINE

## 2018-12-06 PROCEDURE — G8484 FLU IMMUNIZE NO ADMIN: HCPCS | Performed by: INTERNAL MEDICINE

## 2018-12-06 PROCEDURE — 1090F PRES/ABSN URINE INCON ASSESS: CPT | Performed by: INTERNAL MEDICINE

## 2018-12-06 PROCEDURE — 1101F PT FALLS ASSESS-DOCD LE1/YR: CPT | Performed by: INTERNAL MEDICINE

## 2018-12-06 PROCEDURE — 3017F COLORECTAL CA SCREEN DOC REV: CPT | Performed by: INTERNAL MEDICINE

## 2018-12-06 PROCEDURE — 99214 OFFICE O/P EST MOD 30 MIN: CPT | Performed by: INTERNAL MEDICINE

## 2018-12-06 PROCEDURE — 4040F PNEUMOC VAC/ADMIN/RCVD: CPT | Performed by: INTERNAL MEDICINE

## 2018-12-06 PROCEDURE — G8399 PT W/DXA RESULTS DOCUMENT: HCPCS | Performed by: INTERNAL MEDICINE

## 2018-12-06 PROCEDURE — G8417 CALC BMI ABV UP PARAM F/U: HCPCS | Performed by: INTERNAL MEDICINE

## 2018-12-06 PROCEDURE — G8428 CUR MEDS NOT DOCUMENT: HCPCS | Performed by: INTERNAL MEDICINE

## 2018-12-06 PROCEDURE — 1036F TOBACCO NON-USER: CPT | Performed by: INTERNAL MEDICINE

## 2018-12-06 RX ORDER — ROSUVASTATIN CALCIUM 20 MG/1
TABLET, COATED ORAL
Qty: 90 TABLET | Refills: 3 | Status: SHIPPED | OUTPATIENT
Start: 2018-12-06 | End: 2019-06-11 | Stop reason: SDUPTHER

## 2018-12-06 NOTE — PROGRESS NOTES
mildly severe, partially reversible   myocardial perfusion defect of the anterior wall.   There was mild degree of ischemia in the anterior wall.      Recommendation   Clinical correlation is recommended due to poor image quality.      Signatures      ----------------------------------------------------------------   Electronically signed by Marlo Gonzalez MD (Interpreting   Cardiologist) on 04/18/2018 at 20:15    Assessment     Diagnosis Orders   1. PAF (paroxysmal atrial fibrillation) ([facility] [address]- noted on event monitor     2. Pure hypercholesterolemia  rosuvastatin (CRESTOR) 20 MG tablet   3. Palpitations     4. BPV (benign positional vertigo), right     5. S/P cardiac cath- LM-P, LAD -P, D2 OSTIAL 50%, LCX-PATENTM,, RCA MID 35 TO 40%, EDP 14 MMHG, EF 60%- MED RX         Plan   PAF- noted on evenet monitor  Chads2-vasc=2  ( age and female sex)    Dizziness on getting up occasional after lopressor- now resolved  Advised restart lopressor 12.5 po bid   Vertigo not related to lopressor  Cont  apixaban 5 mg po bid  CBC- Okay  Risk of OA has been informed including but not limited ICH  EPS dr. Bry Kiser recommend med RX for now  Consider ILR??  Or event repeat    Dizziness and palpitation- better  Abn nuc stress  New atr fib    BPV  More in night when in bed and turns to Right  Vestibular rehabilation    Nonobstructive CAD  On med x  Cath site look good    Lipid panel and liver function test before next appointment      RTC  6 month      Pearla Signs [facility]

## 2018-12-14 ENCOUNTER — HOSPITAL ENCOUNTER (OUTPATIENT)
Dept: PHYSICAL THERAPY | Age: 73
Setting detail: THERAPIES SERIES
Discharge: HOME | End: 2018-12-14
Payer: MEDICARE

## 2018-12-14 PROCEDURE — G8990 OTHER PT/OT CURRENT STATUS: HCPCS

## 2018-12-14 PROCEDURE — 97161 PT EVAL LOW COMPLEX 20 MIN: CPT

## 2018-12-14 PROCEDURE — G8991 OTHER PT/OT GOAL STATUS: HCPCS

## 2018-12-14 PROCEDURE — 97110 THERAPEUTIC EXERCISES: CPT

## 2018-12-14 NOTE — PROGRESS NOTES
** PLEASE SIGN, DATE AND TIME CERTIFICATION BELOW AND RETURN TO [facility] OUTPATIENT REHABILITATION (FAX #: [phone]). ATTEST/CO-SIGN IF ACCESSING VIA [employer]. THANK YOU.**    I certify that I have examined the patient below and determined that Physical Medicine and Rehabilitation service is necessary and that I approve the established plan of care for up to 90 days or as specifically noted. Attestation, signature or co-signature of physician indicates approval of certification requirements.    ________________________ ____________ __________  Physician Signature   Date   Time       [facility]     Time In: 0800  Time Out: 0840  Minutes: 40  Timed Code Treatment Minutes: 15 Minutes     Date: 2018  Patient Name: Vilma Nath,  Gender:  female        CSN: [id number]   : 1945  (76 y.o.)        Referring Practitioner: Dr Emma Lambert      Diagnosis: Dizziness  Treatment Diagnosis: BPPV  Additional Pertinent Hx: Hx:        General:  PT Visit Information  Onset Date: 18  PT Insurance Information: Medicare - Unlimited visits. Aquatics and modalities except Ionto and HP/CP covered. Total # of Visits to Date: 1  Plan of Care/Certification Expiration Date: 19  Progress Note Counter:          See Medical History Questionnaire for information related to allergies and medications. Subjective:  Chart Reviewed: Yes  Patient assessed for rehabilitation services?: Yes  Family / Caregiver Present: No  Comments: Follow up with [facility] in 6 months. Other (Comment): Script for vestibular rehab     Subjective:  one month ago got dizzy and sick for an entire weekend and thought it was her medication so she stopped her medication.  went to her heart doctor and he told her that it was not her medication and it is vertigo and she needs therapy.   has continued to have symptoms and the only time

## 2019-02-12 ENCOUNTER — HOSPITAL ENCOUNTER (OUTPATIENT)
Dept: WOMENS IMAGING | Age: 74
Discharge: HOME | End: 2019-02-12
Payer: MEDICARE

## 2019-02-12 DIAGNOSIS — Z13.9 VISIT FOR SCREENING: ICD-10-CM

## 2019-02-12 PROCEDURE — 77067 SCR MAMMO BI INCL CAD: CPT

## 2019-03-28 ENCOUNTER — HOSPITAL ENCOUNTER (OUTPATIENT)
Age: 74
Discharge: HOME | End: 2019-03-28
Payer: MEDICARE

## 2019-03-28 DIAGNOSIS — R00.2 PALPITATIONS: ICD-10-CM

## 2019-03-28 DIAGNOSIS — E78.00 PURE HYPERCHOLESTEROLEMIA: ICD-10-CM

## 2019-03-28 DIAGNOSIS — H81.11 BPV (BENIGN POSITIONAL VERTIGO), RIGHT: ICD-10-CM

## 2019-03-28 DIAGNOSIS — I48.0 PAF (PAROXYSMAL ATRIAL FIBRILLATION) (HCC): ICD-10-CM

## 2019-03-28 DIAGNOSIS — Z98.890 S/P CARDIAC CATH: ICD-10-CM

## 2019-03-28 LAB
ALBUMIN SERPL-MCNC: 4.1 G/DL (ref 3.5–5.1)
ALP BLD-CCNC: 75 U/L (ref 38–126)
ALT SERPL-CCNC: 11 U/L (ref 11–66)
AST SERPL-CCNC: 13 U/L (ref 5–40)
BILIRUB SERPL-MCNC: 0.5 MG/DL (ref 0.3–1.2)
BILIRUBIN DIRECT: < 0.2 MG/DL (ref 0–0.3)
CHOLESTEROL, TOTAL: 135 MG/DL (ref 100–199)
HDLC SERPL-MCNC: 47 MG/DL
LDL CHOLESTEROL CALCULATED: 64 MG/DL
TOTAL PROTEIN: 6.9 G/DL (ref 6.1–8)
TRIGL SERPL-MCNC: 121 MG/DL (ref 0–199)

## 2019-03-28 PROCEDURE — 80076 HEPATIC FUNCTION PANEL: CPT

## 2019-03-28 PROCEDURE — 80061 LIPID PANEL: CPT

## 2019-03-28 PROCEDURE — 36415 COLL VENOUS BLD VENIPUNCTURE: CPT

## 2019-06-11 ENCOUNTER — OFFICE VISIT (OUTPATIENT)
Dept: CARDIOLOGY CLINIC | Age: 74
End: 2019-06-11
Payer: MEDICARE

## 2019-06-11 VITALS
BODY MASS INDEX: 32.24 KG/M2 | WEIGHT: 175.2 LBS | HEIGHT: 62 IN | DIASTOLIC BLOOD PRESSURE: 74 MMHG | SYSTOLIC BLOOD PRESSURE: 126 MMHG | HEART RATE: 51 BPM

## 2019-06-11 DIAGNOSIS — E78.00 PURE HYPERCHOLESTEROLEMIA: ICD-10-CM

## 2019-06-11 DIAGNOSIS — I48.0 PAROXYSMAL ATRIAL FIBRILLATION (HCC): Primary | ICD-10-CM

## 2019-06-11 PROCEDURE — 1036F TOBACCO NON-USER: CPT | Performed by: PHYSICIAN ASSISTANT

## 2019-06-11 PROCEDURE — G8427 DOCREV CUR MEDS BY ELIG CLIN: HCPCS | Performed by: PHYSICIAN ASSISTANT

## 2019-06-11 PROCEDURE — 93000 ELECTROCARDIOGRAM COMPLETE: CPT | Performed by: PHYSICIAN ASSISTANT

## 2019-06-11 PROCEDURE — 3017F COLORECTAL CA SCREEN DOC REV: CPT | Performed by: PHYSICIAN ASSISTANT

## 2019-06-11 PROCEDURE — 1123F ACP DISCUSS/DSCN MKR DOCD: CPT | Performed by: PHYSICIAN ASSISTANT

## 2019-06-11 PROCEDURE — 99213 OFFICE O/P EST LOW 20 MIN: CPT | Performed by: PHYSICIAN ASSISTANT

## 2019-06-11 PROCEDURE — 1090F PRES/ABSN URINE INCON ASSESS: CPT | Performed by: PHYSICIAN ASSISTANT

## 2019-06-11 PROCEDURE — 4040F PNEUMOC VAC/ADMIN/RCVD: CPT | Performed by: PHYSICIAN ASSISTANT

## 2019-06-11 PROCEDURE — G8399 PT W/DXA RESULTS DOCUMENT: HCPCS | Performed by: PHYSICIAN ASSISTANT

## 2019-06-11 PROCEDURE — G8417 CALC BMI ABV UP PARAM F/U: HCPCS | Performed by: PHYSICIAN ASSISTANT

## 2019-06-11 RX ORDER — ROSUVASTATIN CALCIUM 20 MG/1
TABLET, COATED ORAL
Qty: 90 TABLET | Refills: 3 | Status: SHIPPED | OUTPATIENT
Start: 2019-06-11 | End: 2020-03-18 | Stop reason: SDUPTHER

## 2019-06-11 NOTE — PROGRESS NOTES
[facility] PROFESSIONAL SERVICES  [facility] [address]   [address] [address]   Dept: [phone]   Dept Fax: [phone]   Loc: [phone]      Chief Complaint   Patient presents with    Follow-up     PAF     Patient with a history of paroxysmal atrial fibrillation presents for six-month follow-up. Patient states she gets some occasional short-lived palpitations. She states usually occurs when she is stressed. She gets some occasional vertigo when moving her head quickly. She gets lightheaded occasionally when bending over. She denies any chest pain or shortness of breath. Cardiologist:  Dr. Venice Driver:   No fever, no chills, No fatigue or weight loss  Pulmonary:    No dyspnea, no wheezing  Cardiac:    Denies recent chest pain   GI:     No nausea or vomiting, no abdominal pain  Neuro:   Occasional light headedness  Musculoskeletal:  No recent active issues  Extremities:   No edema, good peripheral pulses      Past Medical History:   Diagnosis Date    Adenomatous colon polyp 2006    Fibrocystic breast disease 1990    Hyperlipidemia 1990    MVP (mitral valve prolapse) 1997    Osteopenia 02/2018    PONV (postoperative nausea and vomiting)     Vitamin D deficiency 03/2018       Allergies   Allergen Reactions    Metoprolol Nausea And Vomiting     Pt states vomitting and dizziness       Current Outpatient Medications   Medication Sig Dispense Refill    apixaban (ELIQUIS) 5 MG TABS tablet TAKE 1 TABLET TWICE A  tablet 3    metoprolol tartrate (LOPRESSOR) 25 MG tablet Take 0.5 tablets by mouth 2 times daily 90 tablet 2    rosuvastatin (CRESTOR) 20 MG tablet TAKE 1 TABLET DAILY 90 tablet 3    Cholecalciferol (VITAMIN D3) 5000 units CAPS Take 1 capsule by mouth Daily       No current facility-administered medications for this visit.         Social History     Socioeconomic History    Marital status:      Spouse name: None    Number of children: None    Years of education: None    Highest education level: None   Occupational History    None   Social Needs    Financial resource strain: None    Food insecurity:     Worry: None     Inability: None    Transportation needs:     Medical: None     Non-medical: None   Tobacco Use    Smoking status: Former Smoker     Packs/day: 0.50     Years: 4.00     Pack years: 2.00     Types: Cigarettes     Last attempt to quit: 1968     Years since quittin.4    Smokeless tobacco: Never Used   Substance and Sexual Activity    Alcohol use: Yes     Comment: occsional 2-3 times a month    Drug use: No    Sexual activity: None   Lifestyle    Physical activity:     Days per week: None     Minutes per session: None    Stress: None   Relationships    Social connections:     Talks on phone: None     Gets together: None     Attends Worship service: None     Active member of club or organization: None     Attends meetings of clubs or organizations: None     Relationship status: None    Intimate partner violence:     Fear of current or ex partner: None     Emotionally abused: None     Physically abused: None     Forced sexual activity: None   Other Topics Concern    None   Social History Narrative    None       Family History   Problem Relation Age of Onset    Stroke Mother     Heart Disease Mother     Cancer Sister        Blood pressure 126/74, pulse 51, height 5' 2\" (1.575 m), weight 175 lb 3.2 oz (79.5 kg), not currently breastfeeding. General:   Well developed, well nourished  Lungs:   Clear to auscultation  Heart:    Normal S1 S2, No murmur, rubs, or gallops  Abdomen:   Soft, non tender, no organomegalies, positive bowel sounds  Extremities:   No edema, no cyanosis, good peripheral pulses  Neurological:   Awake, alert, oriented.  No obvious focal deficits  Musculoskeletal:  No obvious deformities      Cardiac catheterization 2018  LM-PATENT  LAD PATENT  D2 OSTIAL 50%

## 2019-06-11 NOTE — PROGRESS NOTES
Pt C/O CP maybe 1 time a month, dizziness, heart palpitations,       Pt denies  SOB, Headache, fatigue, swelling

## 2019-10-11 ENCOUNTER — NURSE ONLY (OUTPATIENT)
Dept: FAMILY MEDICINE CLINIC | Age: 74
End: 2019-10-11

## 2019-10-11 DIAGNOSIS — Z23 NEED FOR INFLUENZA VACCINATION: Primary | ICD-10-CM

## 2019-10-11 PROCEDURE — G0008 ADMIN INFLUENZA VIRUS VAC: HCPCS | Performed by: FAMILY MEDICINE

## 2019-10-11 PROCEDURE — 90688 IIV4 VACCINE SPLT 0.5 ML IM: CPT | Performed by: FAMILY MEDICINE

## 2019-11-12 ENCOUNTER — TELEPHONE (OUTPATIENT)
Dept: FAMILY MEDICINE CLINIC | Age: 74
End: 2019-11-12

## 2019-11-15 ENCOUNTER — OFFICE VISIT (OUTPATIENT)
Dept: FAMILY MEDICINE CLINIC | Age: 74
End: 2019-11-15

## 2019-11-15 ENCOUNTER — TELEPHONE (OUTPATIENT)
Dept: FAMILY MEDICINE CLINIC | Age: 74
End: 2019-11-15

## 2019-11-15 VITALS
SYSTOLIC BLOOD PRESSURE: 134 MMHG | HEART RATE: 64 BPM | RESPIRATION RATE: 18 BRPM | DIASTOLIC BLOOD PRESSURE: 78 MMHG | HEIGHT: 62 IN | BODY MASS INDEX: 28.06 KG/M2 | WEIGHT: 152.5 LBS

## 2019-11-15 DIAGNOSIS — F41.9 ANXIETY DISORDER, UNSPECIFIED TYPE: Primary | ICD-10-CM

## 2019-11-15 DIAGNOSIS — F43.22 ADJUSTMENT DISORDER WITH ANXIOUS MOOD: ICD-10-CM

## 2019-11-15 PROCEDURE — 1090F PRES/ABSN URINE INCON ASSESS: CPT | Performed by: FAMILY MEDICINE

## 2019-11-15 PROCEDURE — 1123F ACP DISCUSS/DSCN MKR DOCD: CPT | Performed by: FAMILY MEDICINE

## 2019-11-15 PROCEDURE — G8598 ASA/ANTIPLAT THER USED: HCPCS | Performed by: FAMILY MEDICINE

## 2019-11-15 PROCEDURE — 1036F TOBACCO NON-USER: CPT | Performed by: FAMILY MEDICINE

## 2019-11-15 PROCEDURE — 99213 OFFICE O/P EST LOW 20 MIN: CPT | Performed by: FAMILY MEDICINE

## 2019-11-15 PROCEDURE — 3017F COLORECTAL CA SCREEN DOC REV: CPT | Performed by: FAMILY MEDICINE

## 2019-11-15 PROCEDURE — G8399 PT W/DXA RESULTS DOCUMENT: HCPCS | Performed by: FAMILY MEDICINE

## 2019-11-15 PROCEDURE — 4040F PNEUMOC VAC/ADMIN/RCVD: CPT | Performed by: FAMILY MEDICINE

## 2019-11-15 PROCEDURE — G8417 CALC BMI ABV UP PARAM F/U: HCPCS | Performed by: FAMILY MEDICINE

## 2019-11-15 PROCEDURE — G8427 DOCREV CUR MEDS BY ELIG CLIN: HCPCS | Performed by: FAMILY MEDICINE

## 2019-11-15 RX ORDER — LORAZEPAM 0.5 MG/1
0.5 TABLET ORAL 2 TIMES DAILY PRN
Qty: 60 TABLET | Refills: 0 | Status: SHIPPED | OUTPATIENT
Start: 2019-11-15 | End: 2019-12-15

## 2019-11-25 ENCOUNTER — TELEPHONE (OUTPATIENT)
Dept: CARDIOLOGY CLINIC | Age: 74
End: 2019-11-25

## 2019-11-26 ENCOUNTER — OFFICE VISIT (OUTPATIENT)
Dept: CARDIOLOGY CLINIC | Age: 74
End: 2019-11-26
Payer: MEDICARE

## 2019-11-26 VITALS
SYSTOLIC BLOOD PRESSURE: 119 MMHG | HEIGHT: 62 IN | WEIGHT: 155.2 LBS | DIASTOLIC BLOOD PRESSURE: 73 MMHG | BODY MASS INDEX: 28.56 KG/M2 | HEART RATE: 56 BPM

## 2019-11-26 DIAGNOSIS — I25.10 CORONARY ARTERY DISEASE INVOLVING NATIVE CORONARY ARTERY OF NATIVE HEART WITHOUT ANGINA PECTORIS: Primary | ICD-10-CM

## 2019-11-26 DIAGNOSIS — I48.0 PAROXYSMAL ATRIAL FIBRILLATION (HCC): ICD-10-CM

## 2019-11-26 DIAGNOSIS — E78.00 PURE HYPERCHOLESTEROLEMIA: ICD-10-CM

## 2019-11-26 PROCEDURE — 1090F PRES/ABSN URINE INCON ASSESS: CPT | Performed by: PHYSICIAN ASSISTANT

## 2019-11-26 PROCEDURE — G8399 PT W/DXA RESULTS DOCUMENT: HCPCS | Performed by: PHYSICIAN ASSISTANT

## 2019-11-26 PROCEDURE — 3017F COLORECTAL CA SCREEN DOC REV: CPT | Performed by: PHYSICIAN ASSISTANT

## 2019-11-26 PROCEDURE — 1123F ACP DISCUSS/DSCN MKR DOCD: CPT | Performed by: PHYSICIAN ASSISTANT

## 2019-11-26 PROCEDURE — G8427 DOCREV CUR MEDS BY ELIG CLIN: HCPCS | Performed by: PHYSICIAN ASSISTANT

## 2019-11-26 PROCEDURE — G8417 CALC BMI ABV UP PARAM F/U: HCPCS | Performed by: PHYSICIAN ASSISTANT

## 2019-11-26 PROCEDURE — G8598 ASA/ANTIPLAT THER USED: HCPCS | Performed by: PHYSICIAN ASSISTANT

## 2019-11-26 PROCEDURE — 1036F TOBACCO NON-USER: CPT | Performed by: PHYSICIAN ASSISTANT

## 2019-11-26 PROCEDURE — 4040F PNEUMOC VAC/ADMIN/RCVD: CPT | Performed by: PHYSICIAN ASSISTANT

## 2019-11-26 PROCEDURE — G8482 FLU IMMUNIZE ORDER/ADMIN: HCPCS | Performed by: PHYSICIAN ASSISTANT

## 2019-11-26 PROCEDURE — 99213 OFFICE O/P EST LOW 20 MIN: CPT | Performed by: PHYSICIAN ASSISTANT

## 2019-12-18 ASSESSMENT — ENCOUNTER SYMPTOMS
SINUS PRESSURE: 0
CONSTIPATION: 0
SHORTNESS OF BREATH: 0

## 2019-12-19 DIAGNOSIS — F41.9 ANXIETY DISORDER, UNSPECIFIED TYPE: ICD-10-CM

## 2019-12-19 DIAGNOSIS — F43.22 ADJUSTMENT DISORDER WITH ANXIOUS MOOD: ICD-10-CM

## 2019-12-23 ENCOUNTER — OFFICE VISIT (OUTPATIENT)
Dept: FAMILY MEDICINE CLINIC | Age: 74
End: 2019-12-23

## 2019-12-23 VITALS
HEART RATE: 70 BPM | RESPIRATION RATE: 16 BRPM | BODY MASS INDEX: 28.25 KG/M2 | HEIGHT: 62 IN | SYSTOLIC BLOOD PRESSURE: 118 MMHG | DIASTOLIC BLOOD PRESSURE: 66 MMHG | WEIGHT: 153.5 LBS

## 2019-12-23 PROCEDURE — G8427 DOCREV CUR MEDS BY ELIG CLIN: HCPCS | Performed by: FAMILY MEDICINE

## 2019-12-23 PROCEDURE — G8598 ASA/ANTIPLAT THER USED: HCPCS | Performed by: FAMILY MEDICINE

## 2019-12-23 PROCEDURE — G8417 CALC BMI ABV UP PARAM F/U: HCPCS | Performed by: FAMILY MEDICINE

## 2019-12-23 PROCEDURE — 3017F COLORECTAL CA SCREEN DOC REV: CPT | Performed by: FAMILY MEDICINE

## 2019-12-23 PROCEDURE — G8399 PT W/DXA RESULTS DOCUMENT: HCPCS | Performed by: FAMILY MEDICINE

## 2019-12-23 PROCEDURE — G0009 ADMIN PNEUMOCOCCAL VACCINE: HCPCS | Performed by: FAMILY MEDICINE

## 2019-12-23 PROCEDURE — 99213 OFFICE O/P EST LOW 20 MIN: CPT | Performed by: FAMILY MEDICINE

## 2019-12-23 PROCEDURE — 90670 PCV13 VACCINE IM: CPT | Performed by: FAMILY MEDICINE

## 2019-12-23 PROCEDURE — 4040F PNEUMOC VAC/ADMIN/RCVD: CPT | Performed by: FAMILY MEDICINE

## 2019-12-23 PROCEDURE — 1090F PRES/ABSN URINE INCON ASSESS: CPT | Performed by: FAMILY MEDICINE

## 2019-12-23 PROCEDURE — 1123F ACP DISCUSS/DSCN MKR DOCD: CPT | Performed by: FAMILY MEDICINE

## 2019-12-23 PROCEDURE — 1036F TOBACCO NON-USER: CPT | Performed by: FAMILY MEDICINE

## 2019-12-23 RX ORDER — LORAZEPAM 0.5 MG/1
0.5 TABLET ORAL 2 TIMES DAILY PRN
COMMUNITY
End: 2019-12-23 | Stop reason: SDUPTHER

## 2019-12-23 RX ORDER — LORAZEPAM 0.5 MG/1
0.5 TABLET ORAL 2 TIMES DAILY PRN
Qty: 180 TABLET | Refills: 0 | Status: SHIPPED | OUTPATIENT
Start: 2019-12-23 | End: 2020-03-19 | Stop reason: SDUPTHER

## 2019-12-23 ASSESSMENT — PATIENT HEALTH QUESTIONNAIRE - PHQ9
SUM OF ALL RESPONSES TO PHQ QUESTIONS 1-9: 0
SUM OF ALL RESPONSES TO PHQ9 QUESTIONS 1 & 2: 0
2. FEELING DOWN, DEPRESSED OR HOPELESS: 0
1. LITTLE INTEREST OR PLEASURE IN DOING THINGS: 0
SUM OF ALL RESPONSES TO PHQ QUESTIONS 1-9: 0

## 2019-12-23 ASSESSMENT — ENCOUNTER SYMPTOMS
CONSTIPATION: 0
SHORTNESS OF BREATH: 0
SINUS PRESSURE: 0

## 2019-12-23 NOTE — PROGRESS NOTES
Immunizations Administered     Name Date Dose Route    Pneumococcal Conjugate 13-valent (Fctdxpc96) 12/23/2019 0.5 mL Intramuscular    Site: Deltoid- Right    Lot: [id number]    NDC: 9206-6801-12

## 2020-01-13 ENCOUNTER — TELEPHONE (OUTPATIENT)
Dept: FAMILY MEDICINE CLINIC | Age: 75
End: 2020-01-13

## 2020-02-17 ENCOUNTER — HOSPITAL ENCOUNTER (OUTPATIENT)
Dept: WOMENS IMAGING | Age: 75
Discharge: HOME | End: 2020-02-17
Payer: MEDICARE

## 2020-02-17 PROCEDURE — 77063 BREAST TOMOSYNTHESIS BI: CPT

## 2020-02-17 PROCEDURE — 77080 DXA BONE DENSITY AXIAL: CPT

## 2020-02-27 ENCOUNTER — TELEPHONE (OUTPATIENT)
Dept: FAMILY MEDICINE CLINIC | Age: 75
End: 2020-02-27

## 2020-03-18 RX ORDER — ROSUVASTATIN CALCIUM 20 MG/1
TABLET, COATED ORAL
Qty: 90 TABLET | Refills: 1 | Status: SHIPPED | OUTPATIENT
Start: 2020-03-18 | End: 2020-09-14

## 2020-03-18 NOTE — TELEPHONE ENCOUNTER
Date of last visit:  12/23/2019  Date of next visit:  6/24/2020    Requested Prescriptions     Pending Prescriptions Disp Refills    LORazepam (ATIVAN) 0.5 MG tablet 180 tablet 0     Sig: Take 1 tablet by mouth 2 times daily as needed for Anxiety for up to 90 days.

## 2020-03-18 NOTE — TELEPHONE ENCOUNTER
León Kemp called requesting a refill on the following medications:  Requested Prescriptions     Pending Prescriptions Disp Refills    rosuvastatin (CRESTOR) 20 MG tablet 90 tablet 3     Sig: TAKE 1 TABLET DAILY    apixaban (ELIQUIS) 5 MG TABS tablet 180 tablet 3     Sig: TAKE 1 TABLET TWICE A DAY   [facility] didn't [facility] any refills    [facility]       Date of last visit: 11-26-19  Date of next visit (if applicable): Visit date not found

## 2020-03-19 RX ORDER — LORAZEPAM 0.5 MG/1
0.5 TABLET ORAL 2 TIMES DAILY PRN
Qty: 180 TABLET | Refills: 0 | Status: SHIPPED | OUTPATIENT
Start: 2020-03-19 | End: 2020-06-17

## 2020-04-22 ENCOUNTER — HOSPITAL ENCOUNTER (OUTPATIENT)
Age: 75
Discharge: HOME | End: 2020-04-22
Payer: MEDICARE

## 2020-04-22 LAB — VITAMIN D 25-HYDROXY: 33 NG/ML (ref 30–100)

## 2020-04-22 PROCEDURE — 36415 COLL VENOUS BLD VENIPUNCTURE: CPT

## 2020-04-22 PROCEDURE — 82306 VITAMIN D 25 HYDROXY: CPT

## 2020-04-23 ENCOUNTER — TELEPHONE (OUTPATIENT)
Dept: FAMILY MEDICINE CLINIC | Age: 75
End: 2020-04-23

## 2020-04-23 NOTE — TELEPHONE ENCOUNTER
----- Message from Rodrick Lopez MD sent at 4/23/2020  9:14 AM EDT -----  The vitamin D is way lower than before. Is she still on the over the counter vit D3 at 5,000 units once daily with a meal for better absorption?

## 2020-05-06 ENCOUNTER — OFFICE VISIT (OUTPATIENT)
Dept: CARDIOLOGY CLINIC | Age: 75
End: 2020-05-06
Payer: MEDICARE

## 2020-05-06 VITALS
DIASTOLIC BLOOD PRESSURE: 79 MMHG | HEART RATE: 64 BPM | HEIGHT: 63 IN | BODY MASS INDEX: 28.53 KG/M2 | SYSTOLIC BLOOD PRESSURE: 130 MMHG | WEIGHT: 161 LBS

## 2020-05-06 PROBLEM — I25.10 CORONARY ARTERY DISEASE INVOLVING NATIVE CORONARY ARTERY OF NATIVE HEART WITHOUT ANGINA PECTORIS: Status: ACTIVE | Noted: 2020-05-06

## 2020-05-06 PROCEDURE — 1123F ACP DISCUSS/DSCN MKR DOCD: CPT | Performed by: INTERNAL MEDICINE

## 2020-05-06 PROCEDURE — 99214 OFFICE O/P EST MOD 30 MIN: CPT | Performed by: INTERNAL MEDICINE

## 2020-05-06 PROCEDURE — 93000 ELECTROCARDIOGRAM COMPLETE: CPT | Performed by: INTERNAL MEDICINE

## 2020-05-06 PROCEDURE — G8417 CALC BMI ABV UP PARAM F/U: HCPCS | Performed by: INTERNAL MEDICINE

## 2020-05-06 PROCEDURE — 4040F PNEUMOC VAC/ADMIN/RCVD: CPT | Performed by: INTERNAL MEDICINE

## 2020-05-06 PROCEDURE — 1036F TOBACCO NON-USER: CPT | Performed by: INTERNAL MEDICINE

## 2020-05-06 PROCEDURE — G8399 PT W/DXA RESULTS DOCUMENT: HCPCS | Performed by: INTERNAL MEDICINE

## 2020-05-06 PROCEDURE — 3017F COLORECTAL CA SCREEN DOC REV: CPT | Performed by: INTERNAL MEDICINE

## 2020-05-06 PROCEDURE — 1090F PRES/ABSN URINE INCON ASSESS: CPT | Performed by: INTERNAL MEDICINE

## 2020-05-06 PROCEDURE — G8427 DOCREV CUR MEDS BY ELIG CLIN: HCPCS | Performed by: INTERNAL MEDICINE

## 2020-05-06 NOTE — PROGRESS NOTES
CALCIUM 9.1 05/09/2018    LABGLOM 71 05/09/2018    GLUCOSE 97 05/09/2018     Hepatic Function Panel:    Lab Results   Component Value Date    ALKPHOS 75 03/28/2019    ALT 11 03/28/2019    AST 13 03/28/2019    PROT 6.9 03/28/2019    BILITOT 0.5 03/28/2019    BILIDIR <0.2 03/28/2019    LABALBU 4.1 03/28/2019     Magnesium:  No results found for: MG  Warfarin PT/INR:  No components found for: PTPATWAR, PTINRWAR  HgBA1c:  No results found for: LABA1C  FLP:    Lab Results   Component Value Date    TRIG 121 03/28/2019    HDL 47 03/28/2019    LDLCALC 64 03/28/2019    LDLDIRECT 87.93 09/07/2018     TSH:  No results found for: TSH    Event Monitor reviewed  Revealed two episodes of atr fib with  to 150's on 03/22  For 2.5 strip and 03/23/2018 and 1 strip  And pat did not have  Any symptom while having it      Conclusions      Summary   Nonspecific ST-T wave changes.   No ischemic EKG changes.   There is mild attenuation artifact noted in the anterior wall seems to be   related to breast artifact.   There was a small to moderate sized, mildly severe, partially reversible   myocardial perfusion defect of the anterior wall.   There was mild degree of ischemia in the anterior wall.      Recommendation   Clinical correlation is recommended due to poor image quality.      Signatures      ----------------------------------------------------------------   Electronically signed by Khadijah Graves MD (Interpreting   Cardiologist) on 04/18/2018 at 20:15    EKG NSR , no acute abn 5/6/2020    Assessment     Diagnosis Orders   1. Coronary artery disease involving native coronary artery of native heart without angina pectoris  EKG 12 Lead   2. PAF (paroxysmal atrial fibrillation) ([address]- noted on event monitor     3. Pure hypercholesterolemia     4. S/P cardiac cath- LM-P, LAD -P, D2 OSTIAL 50%, LCX-PATENTM,, RCA MID 35 TO 40%, EDP 14 MMHG, EF 60%- MED RX     5.  Palpitations         Plan   The current meds and labs reviewed    PAF-

## 2020-06-11 ENCOUNTER — HOSPITAL ENCOUNTER (OUTPATIENT)
Age: 75
Discharge: HOME | End: 2020-06-11
Payer: MEDICARE

## 2020-06-11 LAB
ALBUMIN SERPL-MCNC: 4.8 G/DL (ref 3.5–5.1)
ALP BLD-CCNC: 81 U/L (ref 38–126)
ALT SERPL-CCNC: 14 U/L (ref 11–66)
AST SERPL-CCNC: 16 U/L (ref 5–40)
BILIRUB SERPL-MCNC: 0.4 MG/DL (ref 0.3–1.2)
BILIRUBIN DIRECT: < 0.2 MG/DL (ref 0–0.3)
CHOLESTEROL, TOTAL: 205 MG/DL (ref 100–199)
HDLC SERPL-MCNC: 75 MG/DL
LDL CHOLESTEROL CALCULATED: 113 MG/DL
TOTAL PROTEIN: 7.5 G/DL (ref 6.1–8)
TRIGL SERPL-MCNC: 83 MG/DL (ref 0–199)

## 2020-06-11 PROCEDURE — 36415 COLL VENOUS BLD VENIPUNCTURE: CPT

## 2020-06-11 PROCEDURE — 80076 HEPATIC FUNCTION PANEL: CPT

## 2020-06-11 PROCEDURE — 80061 LIPID PANEL: CPT

## 2020-06-24 ENCOUNTER — OFFICE VISIT (OUTPATIENT)
Dept: FAMILY MEDICINE CLINIC | Age: 75
End: 2020-06-24

## 2020-06-24 VITALS
BODY MASS INDEX: 28.7 KG/M2 | WEIGHT: 162 LBS | SYSTOLIC BLOOD PRESSURE: 110 MMHG | RESPIRATION RATE: 12 BRPM | HEART RATE: 64 BPM | HEIGHT: 63 IN | DIASTOLIC BLOOD PRESSURE: 62 MMHG | TEMPERATURE: 97.7 F

## 2020-06-24 PROBLEM — M85.80 OSTEOPENIA: Status: ACTIVE | Noted: 2018-03-08

## 2020-06-24 PROBLEM — Z78.0 POSTMENOPAUSAL STATE: Status: ACTIVE | Noted: 2020-06-24

## 2020-06-24 PROCEDURE — 1036F TOBACCO NON-USER: CPT | Performed by: FAMILY MEDICINE

## 2020-06-24 PROCEDURE — 3017F COLORECTAL CA SCREEN DOC REV: CPT | Performed by: FAMILY MEDICINE

## 2020-06-24 PROCEDURE — 1090F PRES/ABSN URINE INCON ASSESS: CPT | Performed by: FAMILY MEDICINE

## 2020-06-24 PROCEDURE — G0438 PPPS, INITIAL VISIT: HCPCS | Performed by: FAMILY MEDICINE

## 2020-06-24 PROCEDURE — 99213 OFFICE O/P EST LOW 20 MIN: CPT | Performed by: FAMILY MEDICINE

## 2020-06-24 PROCEDURE — 4040F PNEUMOC VAC/ADMIN/RCVD: CPT | Performed by: FAMILY MEDICINE

## 2020-06-24 PROCEDURE — 1123F ACP DISCUSS/DSCN MKR DOCD: CPT | Performed by: FAMILY MEDICINE

## 2020-06-24 PROCEDURE — G8399 PT W/DXA RESULTS DOCUMENT: HCPCS | Performed by: FAMILY MEDICINE

## 2020-06-24 PROCEDURE — G8427 DOCREV CUR MEDS BY ELIG CLIN: HCPCS | Performed by: FAMILY MEDICINE

## 2020-06-24 PROCEDURE — G8417 CALC BMI ABV UP PARAM F/U: HCPCS | Performed by: FAMILY MEDICINE

## 2020-06-24 RX ORDER — LORAZEPAM 0.5 MG/1
0.5 TABLET ORAL DAILY
COMMUNITY
End: 2020-06-24 | Stop reason: SDUPTHER

## 2020-06-24 RX ORDER — LORAZEPAM 0.5 MG/1
0.25 TABLET ORAL DAILY
Qty: 45 TABLET | Refills: 0 | Status: SHIPPED | OUTPATIENT
Start: 2020-06-24 | End: 2020-09-22

## 2020-06-24 ASSESSMENT — PATIENT HEALTH QUESTIONNAIRE - PHQ9
SUM OF ALL RESPONSES TO PHQ QUESTIONS 1-9: 5
SUM OF ALL RESPONSES TO PHQ QUESTIONS 1-9: 4

## 2020-06-24 ASSESSMENT — LIFESTYLE VARIABLES
AUDIT-C TOTAL SCORE: 2
AUDIT TOTAL SCORE: 2
HOW MANY STANDARD DRINKS CONTAINING ALCOHOL DO YOU HAVE ON A TYPICAL DAY: 0
HOW OFTEN DURING THE LAST YEAR HAVE YOU NEEDED AN ALCOHOLIC DRINK FIRST THING IN THE MORNING TO GET YOURSELF GOING AFTER A NIGHT OF HEAVY DRINKING: 0
HOW OFTEN DURING THE LAST YEAR HAVE YOU HAD A FEELING OF GUILT OR REMORSE AFTER DRINKING: 0
HOW OFTEN DURING THE LAST YEAR HAVE YOU FAILED TO DO WHAT WAS NORMALLY EXPECTED FROM YOU BECAUSE OF DRINKING: 0
HAS A RELATIVE, FRIEND, DOCTOR, OR ANOTHER HEALTH PROFESSIONAL EXPRESSED CONCERN ABOUT YOUR DRINKING OR SUGGESTED YOU CUT DOWN: 0
HOW OFTEN DURING THE LAST YEAR HAVE YOU FOUND THAT YOU WERE NOT ABLE TO STOP DRINKING ONCE YOU HAD STARTED: 0
HOW OFTEN DO YOU HAVE A DRINK CONTAINING ALCOHOL: 2
HOW OFTEN DO YOU HAVE SIX OR MORE DRINKS ON ONE OCCASION: 0
HAVE YOU OR SOMEONE ELSE BEEN INJURED AS A RESULT OF YOUR DRINKING: 0
HOW OFTEN DURING THE LAST YEAR HAVE YOU BEEN UNABLE TO REMEMBER WHAT HAPPENED THE NIGHT BEFORE BECAUSE YOU HAD BEEN DRINKING: 0

## 2020-06-24 ASSESSMENT — ENCOUNTER SYMPTOMS
SHORTNESS OF BREATH: 0
CONSTIPATION: 0
SINUS PRESSURE: 0

## 2020-06-24 NOTE — PROGRESS NOTES
Encounter. Allergies   Allergen Reactions    Metoprolol Nausea And Vomiting     Pt states vomitting and dizziness       Prior to Visit Medications    Medication Sig Taking? Authorizing Provider   LORazepam (ATIVAN) 0.5 MG tablet Take 0.5 mg by mouth daily.  Yes Historical Provider, MD   rosuvastatin (CRESTOR) 20 MG tablet TAKE 1 TABLET DAILY Yes FELICITAS Farr - CNP   apixaban (ELIQUIS) 5 MG TABS tablet TAKE 1 TABLET TWICE A DAY Yes FELICITAS Farr - CNP   metoprolol tartrate (LOPRESSOR) 25 MG tablet TAKE ONE-HALF (1/2) TABLET TWICE A DAY Yes Evan Calle MD   sertraline (ZOLOFT) 50 MG tablet Take 1 tablet by mouth daily Yes Lit Gaines MD   Cholecalciferol (VITAMIN D3) 5000 units CAPS Take 1 capsule by mouth Daily Yes Lit Gaines MD       Past Medical History:   Diagnosis Date    Adenomatous colon polyp 2006    Coronary artery disease involving native coronary artery of native heart without angina pectoris 5/6/2020    Fibrocystic breast disease 1990    Hyperlipidemia 1990    MVP (mitral valve prolapse) 1997    Osteopenia 02/2018    PONV (postoperative nausea and vomiting)     Vitamin D deficiency 03/2018       Past Surgical History:   Procedure Laterality Date    COLONOSCOPY  2011    Wisser    COLONOSCOPY  2006    Wisser    COLONOSCOPY  01/25/2017    Dr. Khadijah Vasquez       Family History   Problem Relation Age of Onset    Stroke Mother     Heart Disease Mother     Cancer Sister        CareTeam (Including outside providers/suppliers regularly involved in providing care):   Patient Care Team:  Lit Gaines MD as PCP - General (Family Medicine)  Lit Gaines MD as PCP - REHABILITATION [facility] Empaneled Provider    Wt Readings from Last 3 Encounters:   06/24/20 162 lb (73.5 kg)   05/06/20 161 lb (73 kg)   12/23/19 153 lb 8 oz (69.6 kg)     Vitals:    06/24/20 1204   BP: 110/62   Site: Right Upper Arm Position: Sitting   Cuff Size: Medium Adult   Pulse: 64   Resp: 12   Temp: 97.7 °F (36.5 °C)   TempSrc: Oral   Weight: 162 lb (73.5 kg)   Height: 5' 3\" (1.6 m)     Body mass index is 28.7 kg/m². Based upon direct observation of the patient, evaluation of cognition reveals recent and remote memory intact. 1. She has no questions  2. The lorazapam helps and she does not feel impared  General Appearance: alert and oriented to person, place and time, well-developed and well-nourished, in no acute distress  Skin: warm and dry, no rash or erythema  Head: normocephalic and atraumatic  Pulmonary/Chest: clear to auscultation bilaterally- no wheezes, rales or rhonchi, normal air movement, no respiratory distress  Cardiovascular: normal rate, normal S1 and S2 and no murmurs  Musculoskeletal: normal range of motion, no joint swelling, deformity or tenderness  Neurologic: gait and coordination normal and speech normal    Patient's complete Health Risk Assessment and screening values have been reviewed and are found in Flowsheets. The following problems were reviewed today and where indicated follow up appointments were made and/or referrals ordered. Positive Risk Factor Screenings with Interventions:     General Health:  General  In general, how would you say your health is?: Good  In the past 7 days, have you experienced any of the following?  New or Increased Pain, New or Increased Fatigue, Loneliness, Social Isolation, Stress or Anger?: (!) Stress  Do you get the social and emotional support that you need?: Yes  Do you have a Living Will?: Yes  General Health Risk Interventions:  · helping her  in hospice    Health Habits/Nutrition:  Health Habits/Nutrition  Do you exercise for at least 20 minutes 2-3 times per week?: (!) No  Have you lost any weight without trying in the past 3 months?: No  Do you eat fewer than 2 meals per day?: (!) Yes  Have you seen a dentist within the past year?: (!) No  Body mass index orders and patient instructions/AVS.  . Recommended screening schedule for the next 5-10 years is provided to the patient in written form: see Patient Feliberto Fernandez was seen today for medicare aw.     Diagnoses and all orders for this visit:    Anxiety disorder, unspecified type    Adjustment disorder with anxious mood

## 2020-09-14 RX ORDER — ROSUVASTATIN CALCIUM 20 MG/1
TABLET, COATED ORAL
Qty: 90 TABLET | Refills: 0 | Status: SHIPPED | OUTPATIENT
Start: 2020-09-14 | End: 2020-11-09 | Stop reason: SDUPTHER

## 2020-11-09 ENCOUNTER — OFFICE VISIT (OUTPATIENT)
Dept: CARDIOLOGY CLINIC | Age: 75
End: 2020-11-09
Payer: MEDICARE

## 2020-11-09 VITALS
WEIGHT: 167 LBS | HEIGHT: 63 IN | BODY MASS INDEX: 29.59 KG/M2 | SYSTOLIC BLOOD PRESSURE: 136 MMHG | DIASTOLIC BLOOD PRESSURE: 60 MMHG | HEART RATE: 68 BPM

## 2020-11-09 PROCEDURE — G8484 FLU IMMUNIZE NO ADMIN: HCPCS | Performed by: INTERNAL MEDICINE

## 2020-11-09 PROCEDURE — 4040F PNEUMOC VAC/ADMIN/RCVD: CPT | Performed by: INTERNAL MEDICINE

## 2020-11-09 PROCEDURE — G8427 DOCREV CUR MEDS BY ELIG CLIN: HCPCS | Performed by: INTERNAL MEDICINE

## 2020-11-09 PROCEDURE — 1090F PRES/ABSN URINE INCON ASSESS: CPT | Performed by: INTERNAL MEDICINE

## 2020-11-09 PROCEDURE — G8417 CALC BMI ABV UP PARAM F/U: HCPCS | Performed by: INTERNAL MEDICINE

## 2020-11-09 PROCEDURE — 99214 OFFICE O/P EST MOD 30 MIN: CPT | Performed by: INTERNAL MEDICINE

## 2020-11-09 PROCEDURE — 3017F COLORECTAL CA SCREEN DOC REV: CPT | Performed by: INTERNAL MEDICINE

## 2020-11-09 PROCEDURE — 1036F TOBACCO NON-USER: CPT | Performed by: INTERNAL MEDICINE

## 2020-11-09 PROCEDURE — G8399 PT W/DXA RESULTS DOCUMENT: HCPCS | Performed by: INTERNAL MEDICINE

## 2020-11-09 PROCEDURE — 1123F ACP DISCUSS/DSCN MKR DOCD: CPT | Performed by: INTERNAL MEDICINE

## 2020-11-09 RX ORDER — LORAZEPAM 0.5 MG/1
0.5 TABLET ORAL DAILY PRN
COMMUNITY
End: 2021-05-10

## 2020-11-09 RX ORDER — ROSUVASTATIN CALCIUM 20 MG/1
20 TABLET, COATED ORAL DAILY
Qty: 90 TABLET | Refills: 3 | Status: SHIPPED | OUTPATIENT
Start: 2020-11-09 | End: 2021-11-15

## 2020-11-09 NOTE — PROGRESS NOTES
Chief Complaint   Patient presents with   Leatha Mendez    Coronary Artery Disease     Originally Patient is here due to abnormal monitor strips. Seen by dr. Anant Miranda for palpitation and event show some abnormal Rhythm and hence came to see me here -Dr. Anant Miranda out today  Dr. Rl Crews office called would like patient seen for abnormal monitor strips, notified Dr. Reji Restrepo out this week would like another physician to address.   Appt made for 3-27-18, patient declined she will come Thursday           Pt here for 6 mo check up      Occasional palpitations     Doing well    Denied cp, sob, dizziness and edema    EKG done 3-    Never had syncope        Patient Active Problem List   Diagnosis    Pure hypercholesterolemia    Adenomatous polyp of colon    Vitamin D deficiency    Osteopenia    Palpitations    Paroxysmal atrial fibrillation (HCC)    History of cardiac catheterization    BPV (benign positional vertigo), right    Dyslipidemia    Coronary artery disease involving native coronary artery of native heart without angina pectoris    Postmenopausal state       Past Surgical History:   Procedure Laterality Date    COLONOSCOPY  2011    Wisser    COLONOSCOPY  2006    Wisser    COLONOSCOPY  01/25/2017    Dr. Jacek Wallis       Allergies   Allergen Reactions    Metoprolol Nausea And Vomiting     Pt states vomitting and dizziness        Family History   Problem Relation Age of Onset    Stroke Mother     Heart Disease Mother     Cancer Sister         Social History     Socioeconomic History    Marital status:      Spouse name: Not on file    Number of children: Not on file    Years of education: Not on file    Highest education level: Not on file   Occupational History    Not on file   Social Needs    Financial resource strain: Not on file    Food insecurity     Worry: Not on file     Inability: Not on file   "[employer] needs shortness of breath, or wheezing  Cardiovascular ROS: no chest pain or dyspnea on exertion  Gastrointestinal ROS: negative  Genito-Urinary ROS: no dysuria, trouble voiding, or hematuria  Musculoskeletal ROS: negative  Neurological ROS: no TIA or stroke symptoms  Dermatological ROS: negative      Blood pressure 136/60, pulse 68, height 5' 3\" (1.6 m), weight 167 lb (75.8 kg), not currently breastfeeding. Physical Examination:    General appearance - alert, well appearing, and in no distress  Mental status - alert, oriented to person, place, and time  Neck - supple, no significant adenopathy, no JVD, or carotid bruits  Chest - clear to auscultation, no wheezes, rales or rhonchi, symmetric air entry  Heart - normal rate, regular rhythm, normal S1, S2, no murmurs, rubs, clicks or gallops  Abdomen - soft, nontender, nondistended, no masses or organomegaly  Neurological - alert, oriented, normal speech, no focal findings or movement disorder noted  Musculoskeletal - no joint tenderness, deformity or swelling  Extremities - peripheral pulses normal, no pedal edema, no clubbing or cyanosis  Skin - normal coloration and turgor, no rashes, no suspicious skin lesions noted    Lab  No results for input(s): CKTOTAL, CKMB, CKMBINDEX, TROPONINI in the last 72 hours.   CBC:   Lab Results   Component Value Date    WBC 6.9 05/09/2018    RBC 4.67 05/09/2018    HGB 13.5 05/09/2018    HCT 39.8 05/09/2018    MCV 85.1 05/09/2018    MCH 29.0 05/09/2018    MCHC 34.0 05/09/2018    RDW 12.8 05/09/2018     05/09/2018    MPV 8.3 05/09/2018     BMP:    Lab Results   Component Value Date     05/09/2018    K 3.9 05/09/2018     05/09/2018    CO2 27 05/09/2018    BUN 16 05/09/2018    LABALBU 4.8 06/11/2020    CREATININE 0.8 05/09/2018    CALCIUM 9.1 05/09/2018    LABGLOM 71 05/09/2018    GLUCOSE 97 05/09/2018     Hepatic Function Panel:    Lab Results   Component Value Date    ALKPHOS 81 06/11/2020    ALT 14 06/11/2020    AST 16 06/11/2020    PROT 7.5 06/11/2020    BILITOT 0.4 06/11/2020    BILIDIR <0.2 06/11/2020    LABALBU 4.8 06/11/2020     Magnesium:  No results found for: MG  Warfarin PT/INR:  No components found for: PTPATWAR, PTINRWAR  HgBA1c:  No results found for: LABA1C  FLP:    Lab Results   Component Value Date    TRIG 83 06/11/2020    HDL 75 06/11/2020    LDLCALC 113 06/11/2020    LDLDIRECT 87.93 09/07/2018     TSH:  No results found for: TSH    Event Monitor reviewed  Revealed two episodes of atr fib with  to 150's on 03/22  For 2.5 strip and 03/23/2018 and 1 strip  And pat did not have  Any symptom while having it      Conclusions      Summary   Nonspecific ST-T wave changes.   No ischemic EKG changes.   There is mild attenuation artifact noted in the anterior wall seems to be   related to breast artifact.   There was a small to moderate sized, mildly severe, partially reversible   myocardial perfusion defect of the anterior wall.   There was mild degree of ischemia in the anterior wall.      Recommendation   Clinical correlation is recommended due to poor image quality.      Signatures      ----------------------------------------------------------------   Electronically signed by Gema Zelaya MD (Interpreting   Cardiologist) on 04/18/2018 at 20:15    Conclusions      Procedure Summary   LM-PATENT   LAD PATENT   D2 OSTIAL 50% STENOSIS   LCX-PATENT   RCA- MID 35 TO 40% DIFFUSE LESIONS   Normal LV systolic function. LVEF approximately 60% . EDP 14 mmhg   No MR   NO transaortic Gradient      Recommendations   Continue with aggressive risk factor modification and medical therapy. Estimated Blood Loss:5 ml. Complications:No complications.       Signatures      ----------------------------------------------------------------   Electronically signed by Gema Zelaya MD (Performing   Physician) on 05/09/2018 at 12:10   ----------------------------------------------------------------         KJ WHITTEN , no acute abn 5/6/2020    Assessment     Diagnosis Orders   1. Coronary artery disease involving native coronary artery of native heart without angina pectoris     2. Paroxysmal atrial fibrillation (HCC)     3. Pure hypercholesterolemia  rosuvastatin (CRESTOR) 20 MG tablet   4. Dyslipidemia         Plan   The  Most current meds and labs reviewed    PAF- noted on event monitor  Chads2-vasc=2  ( age and female sex)  No dizziness   Doing well with lopressor  Cont  apixaban 5 mg po bid  CBC- Okay  Risk of OA has been informed including but not limited ICH  EPS dr. Marquita Carrero recommend med RX for now  Consider ILR?? Or event repeat    Dizziness and palpitation- better  Abn nuc stress- but cath nonrevealing      Hx of BPV  More in night when in bed and turns to Right  Vestibular rehabilation    Nonobstructive CAD  On med Rx    Lipid panel and liver function test before next appointment    Discussed use, benefit, and side effects of prescribed medications. All patient questions answered. Pt voiced understanding. Instructed to continue current medications, diet and exercise. Continue risk factor modification and medical management. Patient agreed with treatment plan. Follow up as directed.       RTC  6 months      [facility]

## 2020-12-10 ENCOUNTER — HOSPITAL ENCOUNTER (OUTPATIENT)
Age: 75
Discharge: HOME | End: 2020-12-10
Payer: MEDICARE

## 2020-12-10 LAB
ALBUMIN SERPL-MCNC: 4.5 G/DL (ref 3.5–5.1)
ALP BLD-CCNC: 77 U/L (ref 38–126)
ALT SERPL-CCNC: 13 U/L (ref 11–66)
AST SERPL-CCNC: 15 U/L (ref 5–40)
BILIRUB SERPL-MCNC: 0.4 MG/DL (ref 0.3–1.2)
BILIRUBIN DIRECT: < 0.2 MG/DL (ref 0–0.3)
CHOLESTEROL, TOTAL: 162 MG/DL (ref 100–199)
HDLC SERPL-MCNC: 45 MG/DL
LDL CHOLESTEROL CALCULATED: 90 MG/DL
TOTAL PROTEIN: 6.9 G/DL (ref 6.1–8)
TRIGL SERPL-MCNC: 136 MG/DL (ref 0–199)

## 2020-12-10 PROCEDURE — 36415 COLL VENOUS BLD VENIPUNCTURE: CPT

## 2020-12-10 PROCEDURE — 80061 LIPID PANEL: CPT

## 2020-12-10 PROCEDURE — 80076 HEPATIC FUNCTION PANEL: CPT

## 2020-12-21 ENCOUNTER — OFFICE VISIT (OUTPATIENT)
Dept: FAMILY MEDICINE CLINIC | Age: 75
End: 2020-12-21

## 2020-12-21 VITALS
WEIGHT: 176 LBS | TEMPERATURE: 95.4 F | RESPIRATION RATE: 14 BRPM | SYSTOLIC BLOOD PRESSURE: 104 MMHG | DIASTOLIC BLOOD PRESSURE: 62 MMHG | BODY MASS INDEX: 31.18 KG/M2 | HEIGHT: 63 IN | HEART RATE: 60 BPM

## 2020-12-21 PROCEDURE — 99214 OFFICE O/P EST MOD 30 MIN: CPT | Performed by: FAMILY MEDICINE

## 2020-12-21 ASSESSMENT — PATIENT HEALTH QUESTIONNAIRE - PHQ9
1. LITTLE INTEREST OR PLEASURE IN DOING THINGS: 1
SUM OF ALL RESPONSES TO PHQ QUESTIONS 1-9: 2
2. FEELING DOWN, DEPRESSED OR HOPELESS: 1
SUM OF ALL RESPONSES TO PHQ QUESTIONS 1-9: 2
SUM OF ALL RESPONSES TO PHQ QUESTIONS 1-9: 2
SUM OF ALL RESPONSES TO PHQ9 QUESTIONS 1 & 2: 2

## 2020-12-21 ASSESSMENT — ENCOUNTER SYMPTOMS
SHORTNESS OF BREATH: 0
CONSTIPATION: 0
SINUS PRESSURE: 0

## 2020-12-21 NOTE — PROGRESS NOTES
Subjective:      Patient ID: Socorro Willett is a 77 y.o. female. HPI  1. The zoloft and the ativan are helpful and she does not feel impared  Review of Systems   Constitutional: Negative for fatigue. HENT: Negative for sinus pressure. Eyes: Negative for visual disturbance. Respiratory: Negative for shortness of breath. Cardiovascular: Negative for chest pain. Gastrointestinal: Negative for constipation. Genitourinary: Negative. Musculoskeletal: Negative for arthralgias. Skin: Negative for rash. Neurological: Negative for headaches. The patient's medications, allergies, past medical problems, surgical, social, and family histories were reviewed and updated as needed. Objective:   Physical Exam  Constitutional:       General: She is not in acute distress. Appearance: She is well-developed. HENT:      Head: Normocephalic and atraumatic. Right Ear: External ear normal.      Left Ear: External ear normal.      Nose: Nose normal.      Mouth/Throat:      Pharynx: No oropharyngeal exudate. Eyes:      General: No scleral icterus. Conjunctiva/sclera: Conjunctivae normal.   Neck:      Musculoskeletal: Neck supple. Thyroid: No thyromegaly. Vascular: No carotid bruit. Trachea: No tracheal deviation. Cardiovascular:      Rate and Rhythm: Normal rate and regular rhythm. Heart sounds: Normal heart sounds. Pulmonary:      Effort: Pulmonary effort is normal.      Breath sounds: Normal breath sounds. Abdominal:      General: Bowel sounds are normal.      Palpations: Abdomen is soft. There is no mass. Lymphadenopathy:      Cervical: No cervical adenopathy. Skin:     General: Skin is warm and dry. Neurological:      Mental Status: She is alert and oriented to person, place, and time. Psychiatric:         Behavior: Behavior normal.     Blood pressure 104/62, pulse 60, temperature 95.4 °F (35.2 °C), temperature source Skin, resp.  rate 14, height 5' 3\" (1.6 m), weight 176 lb (79.8 kg), not currently breastfeeding. Assessment:       Diagnosis Orders   1. Paroxysmal atrial fibrillation (HCC)  Comprehensive Metabolic Panel, Fasting   2. Vitamin D deficiency  Vitamin D 25 Hydroxy   3. Anxiety disorder, unspecified type  sertraline (ZOLOFT) 50 MG tablet   4. Adjustment disorder with anxious mood  sertraline (ZOLOFT) 50 MG tablet   5.  Pure hypercholesterolemia  LDL Cholesterol, Direct           Plan:      Try taking a half of a lorazepam daily and see how that works  See me in 6 months  Do the fasting labs in 6 months        Melanie Partida MD

## 2020-12-21 NOTE — PATIENT INSTRUCTIONS
Try taking a half of a lorazepam daily and see how that works  See me in 6 months  Do the fasting labs in 6 months

## 2021-02-19 ENCOUNTER — HOSPITAL ENCOUNTER (OUTPATIENT)
Dept: WOMENS IMAGING | Age: 76
Discharge: HOME | End: 2021-02-19
Payer: MEDICARE

## 2021-02-19 DIAGNOSIS — Z12.31 VISIT FOR SCREENING MAMMOGRAM: ICD-10-CM

## 2021-02-19 PROCEDURE — 77063 BREAST TOMOSYNTHESIS BI: CPT

## 2021-05-10 ENCOUNTER — OFFICE VISIT (OUTPATIENT)
Dept: CARDIOLOGY CLINIC | Age: 76
End: 2021-05-10
Payer: MEDICARE

## 2021-05-10 VITALS
WEIGHT: 181.2 LBS | HEIGHT: 62 IN | BODY MASS INDEX: 33.34 KG/M2 | SYSTOLIC BLOOD PRESSURE: 143 MMHG | DIASTOLIC BLOOD PRESSURE: 80 MMHG | HEART RATE: 63 BPM

## 2021-05-10 DIAGNOSIS — I25.10 CORONARY ARTERY DISEASE INVOLVING NATIVE CORONARY ARTERY OF NATIVE HEART WITHOUT ANGINA PECTORIS: Primary | ICD-10-CM

## 2021-05-10 DIAGNOSIS — E78.00 PURE HYPERCHOLESTEROLEMIA: ICD-10-CM

## 2021-05-10 DIAGNOSIS — R00.2 PALPITATIONS: ICD-10-CM

## 2021-05-10 DIAGNOSIS — I48.0 PAROXYSMAL ATRIAL FIBRILLATION (HCC): ICD-10-CM

## 2021-05-10 PROCEDURE — G8417 CALC BMI ABV UP PARAM F/U: HCPCS | Performed by: INTERNAL MEDICINE

## 2021-05-10 PROCEDURE — 3017F COLORECTAL CA SCREEN DOC REV: CPT | Performed by: INTERNAL MEDICINE

## 2021-05-10 PROCEDURE — 4040F PNEUMOC VAC/ADMIN/RCVD: CPT | Performed by: INTERNAL MEDICINE

## 2021-05-10 PROCEDURE — 93000 ELECTROCARDIOGRAM COMPLETE: CPT | Performed by: INTERNAL MEDICINE

## 2021-05-10 PROCEDURE — 1036F TOBACCO NON-USER: CPT | Performed by: INTERNAL MEDICINE

## 2021-05-10 PROCEDURE — G8399 PT W/DXA RESULTS DOCUMENT: HCPCS | Performed by: INTERNAL MEDICINE

## 2021-05-10 PROCEDURE — 99213 OFFICE O/P EST LOW 20 MIN: CPT | Performed by: INTERNAL MEDICINE

## 2021-05-10 PROCEDURE — G8427 DOCREV CUR MEDS BY ELIG CLIN: HCPCS | Performed by: INTERNAL MEDICINE

## 2021-05-10 PROCEDURE — 1123F ACP DISCUSS/DSCN MKR DOCD: CPT | Performed by: INTERNAL MEDICINE

## 2021-05-10 PROCEDURE — 1090F PRES/ABSN URINE INCON ASSESS: CPT | Performed by: INTERNAL MEDICINE

## 2021-05-10 NOTE — PROGRESS NOTES
Chief Complaint   Patient presents with    6 Month Follow-Up    Coronary Artery Disease    Check-Up     Originally Patient is here due to abnormal monitor strips. Seen by dr. Kristine Alatorre for palpitation and event show some abnormal Rhythm and hence came to see me here -Dr. Kristine Alatorre out today  Dr. Anjel Marcelino office called would like patient seen for abnormal monitor strips, notified Dr. Maggie Patton out this week would like another physician to address. Appt made for 3-27-18, patient declined she will come Thursday          Pt here for a 6 month f/u    EKG done today    Orthostatic BP done  Sob on exertion     Occasional palpitations     Hx of dizziness better    Denied cp, dizziness and edema    EKG done 3-    Never had syncope        Patient Active Problem List   Diagnosis    Pure hypercholesterolemia    Adenomatous polyp of colon    Vitamin D deficiency    Osteopenia    Palpitations    Paroxysmal atrial fibrillation (HCC)    History of cardiac catheterization    BPV (benign positional vertigo), right    Dyslipidemia    Coronary artery disease involving native coronary artery of native heart without angina pectoris    Postmenopausal state       Past Surgical History:   Procedure Laterality Date    COLONOSCOPY  2011    Wisser    COLONOSCOPY  2006    Wisser    COLONOSCOPY  01/25/2017    Dr. Jb Davila       No Known Allergies     Family History   Problem Relation Age of Onset    Stroke Mother     Heart Disease Mother     Cancer Sister         Social History     Socioeconomic History    Marital status:       Spouse name: Not on file    Number of children: Not on file    Years of education: Not on file    Highest education level: Not on file   Occupational History    Not on file   Social Needs    Financial resource strain: Not on file    Food insecurity     Worry: Not on file     Inability: Not on file   [employer] needs Patient: Olga Bailey    Procedure(s):  left parietal craniotomy for tumor resection    Diagnosis:Pneumocephalus [G93.89]  Diagnosis Additional Information: No value filed.    Anesthesia Type:  No value filed.    Note:  Disposition: ICU            ICU Sign Out: Anesthesiologist/ICU physician sign out WAS performed   Postop Pain Control: Uneventful            Sign Out: Well controlled pain   PONV: No   Neuro/Psych: Uneventful            Sign Out: Acceptable/Baseline neuro status   Airway/Respiratory: Uneventful            Sign Out: Acceptable/Baseline resp. status   CV/Hemodynamics: Uneventful            Sign Out: Acceptable CV status   Other NRE: NONE   DID A NON-ROUTINE EVENT OCCUR? No         Last vitals:  Vitals:    02/24/21 0515 02/24/21 0530 02/24/21 0545   BP: (!) 147/74 (!) 140/75 139/74   Pulse: 62 61 61   Resp: 19 15 12   Temp:  37.3  C (99.2  F)    SpO2:   94%       Last vitals prior to Anesthesia Care Transfer:  CRNA VITALS  2/23/2021 2000 - 2/23/2021 2100      2/23/2021             Pulse:  57    ART BP:  164/88          Electronically Signed By: Johanna Hampton MD, MD  February 24, 2021  6:17 AM   Medical: Not on file     Non-medical: Not on file   Tobacco Use    Smoking status: Former Smoker     Packs/day: 0.50     Years: 4.00     Pack years: 2.00     Types: Cigarettes     Quit date: 26     Years since quittin.1    Smokeless tobacco: Never Used   Substance and Sexual Activity    Alcohol use: Yes     Comment: occsional 2-3 times a month    Drug use: No    Sexual activity: Not on file   Lifestyle    Physical activity     Days per week: Not on file     Minutes per session: Not on file    Stress: Not on file   Relationships    Social connections     Talks on phone: Not on file     Gets together: Not on file     Attends [employer] service: Not on file     Active member of club or organization: Not on file     Attends meetings of clubs or organizations: Not on file     Relationship status: Not on file    Intimate partner violence     Fear of current or ex partner: Not on file     Emotionally abused: Not on file     Physically abused: Not on file     Forced sexual activity: Not on file   Other Topics Concern    Not on file   Social History Narrative    Not on file       Current Outpatient Medications   Medication Sig Dispense Refill    metoprolol tartrate (LOPRESSOR) 25 MG tablet TAKE ONE-HALF (1/2) TABLET TWICE A DAY 90 tablet 0    rosuvastatin (CRESTOR) 20 MG tablet Take 1 tablet by mouth daily 90 tablet 3    apixaban (ELIQUIS) 5 MG TABS tablet TAKE 1 TABLET TWICE A  tablet 3    Cholecalciferol (VITAMIN D3) 5000 units CAPS Take 1 capsule by mouth Daily       No current facility-administered medications for this visit. Review of Systems -     General ROS: negative  Psychological ROS: negative  Hematological and Lymphatic ROS: No history of blood clots or bleeding disorder.    Respiratory ROS: no cough, shortness of breath, or wheezing  Cardiovascular ROS: no chest pain or dyspnea on exertion  Gastrointestinal ROS: negative  Genito-Urinary ROS: no dysuria, trouble voiding, or hematuria  Musculoskeletal ROS: negative  Neurological ROS: no TIA or stroke symptoms  Dermatological ROS: negative      Blood pressure (!) 143/80, pulse 63, height 5' 2\" (1.575 m), weight 181 lb 3.2 oz (82.2 kg), not currently breastfeeding. Physical Examination:    General appearance - alert, well appearing, and in no distress  Mental status - alert, oriented to person, place, and time  Neck - supple, no significant adenopathy, no JVD, or carotid bruits  Chest - clear to auscultation, no wheezes, rales or rhonchi, symmetric air entry  Heart - normal rate, regular rhythm, normal S1, S2, no murmurs, rubs, clicks or gallops  Abdomen - soft, nontender, nondistended, no masses or organomegaly  Neurological - alert, oriented, normal speech, no focal findings or movement disorder noted  Musculoskeletal - no joint tenderness, deformity or swelling  Extremities - peripheral pulses normal, no pedal edema, no clubbing or cyanosis  Skin - normal coloration and turgor, no rashes, no suspicious skin lesions noted    Lab  No results for input(s): CKTOTAL, CKMB, CKMBINDEX, TROPONINI in the last 72 hours.   CBC:   Lab Results   Component Value Date    WBC 6.9 05/09/2018    RBC 4.67 05/09/2018    HGB 13.5 05/09/2018    HCT 39.8 05/09/2018    MCV 85.1 05/09/2018    MCH 29.0 05/09/2018    MCHC 34.0 05/09/2018    RDW 12.8 05/09/2018     05/09/2018    MPV 8.3 05/09/2018     BMP:    Lab Results   Component Value Date     05/09/2018    K 3.9 05/09/2018     05/09/2018    CO2 27 05/09/2018    BUN 16 05/09/2018    LABALBU 4.5 12/10/2020    CREATININE 0.8 05/09/2018    CALCIUM 9.1 05/09/2018    LABGLOM 71 05/09/2018    GLUCOSE 97 05/09/2018     Hepatic Function Panel:    Lab Results   Component Value Date    ALKPHOS 77 12/10/2020    ALT 13 12/10/2020    AST 15 12/10/2020    PROT 6.9 12/10/2020    BILITOT 0.4 12/10/2020    BILIDIR <0.2 12/10/2020    LABALBU 4.5 12/10/2020     Magnesium:  No results found for: MG  Warfarin PT/INR:  No components found for: PTPATWAR, PTINRWAR  HgBA1c:  No results found for: LABA1C  FLP:    Lab Results   Component Value Date    TRIG 136 12/10/2020    HDL 45 12/10/2020    LDLCALC 90 12/10/2020    LDLDIRECT 87.93 09/07/2018     TSH:  No results found for: TSH    Event Monitor reviewed  Revealed two episodes of atr fib with  to 150's on 03/22  For 2.5 strip and 03/23/2018 and 1 strip  And pat did not have  Any symptom while having it      Conclusions      Summary   Nonspecific ST-T wave changes.   No ischemic EKG changes.   There is mild attenuation artifact noted in the anterior wall seems to be   related to breast artifact.   There was a small to moderate sized, mildly severe, partially reversible   myocardial perfusion defect of the anterior wall.   There was mild degree of ischemia in the anterior wall.      Recommendation   Clinical correlation is recommended due to poor image quality.      Signatures      ----------------------------------------------------------------   Electronically signed by Angella Pyle MD (Interpreting   Cardiologist) on 04/18/2018 at 20:15    Conclusions      Procedure Summary   LM-PATENT   LAD PATENT   D2 OSTIAL 50% STENOSIS   LCX-PATENT   RCA- MID 35 TO 40% DIFFUSE LESIONS   Normal LV systolic function. LVEF approximately 60% . EDP 14 mmhg   No MR   NO transaortic Gradient      Recommendations   Continue with aggressive risk factor modification and medical therapy. Estimated Blood Loss:5 ml. Complications:No complications. Signatures      ----------------------------------------------------------------   Electronically signed by Angella Pyle MD (Performing   Physician) on 05/09/2018 at 12:10   ----------------------------------------------------------------         EKG NSR , no acute abn 5/6/2020    Ekg 5/10/21  Sinus  Rhythm   Low voltage -possible pulmonary disease. ABNORMAL         Assessment     Diagnosis Orders   1.  Coronary artery disease involving native coronary artery of native heart without angina pectoris  EKG 12 Lead   2. Paroxysmal atrial fibrillation (HCC)  EKG 12 Lead   3. Pure hypercholesterolemia     4. Palpitations  EKG 12 Lead       Plan     Continue the current treatment and with constant vigilance to changes in symptoms and also any potential side effects. Return for care or seek medical attention immediately if symptoms got worse and/or develop new symptoms. The  current meds and labs reviewed    PAF- noted on event monitor  Chads2-vasc=2  ( age and female sex)  No dizziness   Doing well with lopressor  Cont  apixaban 5 mg po bid  CBC- Okay  Risk of OA has been informed including but not limited ICH  EPS -Dr. Kristine Alatorre recommend med RX for now  Consider ILR?? Or event repeat    Dizziness and palpitation- better  Abn nuc stress- but cath nonrevealing  Bedside orthostatic eval- negative      Hx of BPV  More in night when in bed and turns to Right  Vestibular rehabilation    Nonobstructive CAD  On med Rx    Lipid panel and liver function test before next appointment    Discussed use, benefit, and side effects of prescribed medications. All patient questions answered. Pt voiced understanding. Instructed to continue current medications, diet and exercise. Continue risk factor modification and medical management. Patient agreed with treatment plan. Follow up as directed.       RTC  6 months      [facility]

## 2021-06-03 ENCOUNTER — HOSPITAL ENCOUNTER (OUTPATIENT)
Age: 76
Discharge: HOME | End: 2021-06-03
Payer: MEDICARE

## 2021-06-03 DIAGNOSIS — I48.0 PAROXYSMAL ATRIAL FIBRILLATION (HCC): ICD-10-CM

## 2021-06-03 DIAGNOSIS — R00.2 PALPITATIONS: ICD-10-CM

## 2021-06-03 DIAGNOSIS — E78.00 PURE HYPERCHOLESTEROLEMIA: ICD-10-CM

## 2021-06-03 DIAGNOSIS — I25.10 CORONARY ARTERY DISEASE INVOLVING NATIVE CORONARY ARTERY OF NATIVE HEART WITHOUT ANGINA PECTORIS: ICD-10-CM

## 2021-06-03 DIAGNOSIS — E55.9 VITAMIN D DEFICIENCY: ICD-10-CM

## 2021-06-03 LAB
ALBUMIN SERPL-MCNC: 4.6 G/DL (ref 3.5–5.1)
ALP BLD-CCNC: 81 U/L (ref 38–126)
ALT SERPL-CCNC: 11 U/L (ref 11–66)
ANION GAP SERPL CALCULATED.3IONS-SCNC: 12 MEQ/L (ref 8–16)
AST SERPL-CCNC: 15 U/L (ref 5–40)
BILIRUB SERPL-MCNC: 0.6 MG/DL (ref 0.3–1.2)
BILIRUBIN DIRECT: < 0.2 MG/DL (ref 0–0.3)
BUN BLDV-MCNC: 17 MG/DL (ref 7–22)
CALCIUM SERPL-MCNC: 10 MG/DL (ref 8.5–10.5)
CHLORIDE BLD-SCNC: 102 MEQ/L (ref 98–111)
CHOLESTEROL, TOTAL: 187 MG/DL (ref 100–199)
CO2: 26 MEQ/L (ref 23–33)
CREAT SERPL-MCNC: 0.9 MG/DL (ref 0.4–1.2)
GFR SERPL CREATININE-BSD FRML MDRD: 61 ML/MIN/1.73M2
GLUCOSE FASTING: 106 MG/DL (ref 70–108)
HDLC SERPL-MCNC: 54 MG/DL
LDL CHOLESTEROL CALCULATED: 104 MG/DL
LDL CHOLESTEROL DIRECT: 114.67 MG/DL
POTASSIUM SERPL-SCNC: 4.2 MEQ/L (ref 3.5–5.2)
SODIUM BLD-SCNC: 140 MEQ/L (ref 135–145)
TOTAL PROTEIN: 7.5 G/DL (ref 6.1–8)
TRIGL SERPL-MCNC: 145 MG/DL (ref 0–199)
VITAMIN D 25-HYDROXY: 51 NG/ML (ref 30–100)

## 2021-06-03 PROCEDURE — 80061 LIPID PANEL: CPT

## 2021-06-03 PROCEDURE — 36415 COLL VENOUS BLD VENIPUNCTURE: CPT

## 2021-06-03 PROCEDURE — 82306 VITAMIN D 25 HYDROXY: CPT

## 2021-06-03 PROCEDURE — 80053 COMPREHEN METABOLIC PANEL: CPT

## 2021-06-03 PROCEDURE — 83721 ASSAY OF BLOOD LIPOPROTEIN: CPT

## 2021-06-08 ENCOUNTER — TELEPHONE (OUTPATIENT)
Dept: FAMILY MEDICINE CLINIC | Age: 76
End: 2021-06-08

## 2021-06-08 NOTE — TELEPHONE ENCOUNTER
----- Message from Ankit Mello MD sent at 6/7/2021  8:25 PM EDT -----  Check with the lab because a CMP was ordered and it looks like a BMP was done.

## 2021-06-08 NOTE — TELEPHONE ENCOUNTER
[facility]- they stated a CMP was completed and they are unsure why only certain results are coming through in Epic. They are faxing over the results and will have Loraine Hu scan them into Epic.

## 2021-06-21 ENCOUNTER — OFFICE VISIT (OUTPATIENT)
Dept: FAMILY MEDICINE CLINIC | Age: 76
End: 2021-06-21

## 2021-06-21 VITALS
HEIGHT: 62 IN | RESPIRATION RATE: 16 BRPM | TEMPERATURE: 96.2 F | HEART RATE: 66 BPM | SYSTOLIC BLOOD PRESSURE: 124 MMHG | WEIGHT: 177.38 LBS | DIASTOLIC BLOOD PRESSURE: 62 MMHG | BODY MASS INDEX: 32.64 KG/M2

## 2021-06-21 DIAGNOSIS — E78.00 PURE HYPERCHOLESTEROLEMIA: ICD-10-CM

## 2021-06-21 DIAGNOSIS — F41.9 ANXIETY DISORDER, UNSPECIFIED TYPE: ICD-10-CM

## 2021-06-21 DIAGNOSIS — E55.9 VITAMIN D DEFICIENCY: Primary | ICD-10-CM

## 2021-06-21 PROCEDURE — 1090F PRES/ABSN URINE INCON ASSESS: CPT | Performed by: FAMILY MEDICINE

## 2021-06-21 PROCEDURE — 4040F PNEUMOC VAC/ADMIN/RCVD: CPT | Performed by: FAMILY MEDICINE

## 2021-06-21 PROCEDURE — 1123F ACP DISCUSS/DSCN MKR DOCD: CPT | Performed by: FAMILY MEDICINE

## 2021-06-21 PROCEDURE — G8417 CALC BMI ABV UP PARAM F/U: HCPCS | Performed by: FAMILY MEDICINE

## 2021-06-21 PROCEDURE — G8399 PT W/DXA RESULTS DOCUMENT: HCPCS | Performed by: FAMILY MEDICINE

## 2021-06-21 PROCEDURE — 3017F COLORECTAL CA SCREEN DOC REV: CPT | Performed by: FAMILY MEDICINE

## 2021-06-21 PROCEDURE — G8427 DOCREV CUR MEDS BY ELIG CLIN: HCPCS | Performed by: FAMILY MEDICINE

## 2021-06-21 PROCEDURE — 1036F TOBACCO NON-USER: CPT | Performed by: FAMILY MEDICINE

## 2021-06-21 PROCEDURE — 99213 OFFICE O/P EST LOW 20 MIN: CPT | Performed by: FAMILY MEDICINE

## 2021-06-21 RX ORDER — SERTRALINE HYDROCHLORIDE 25 MG/1
25 TABLET, FILM COATED ORAL DAILY
Qty: 90 TABLET | Refills: 1 | Status: SHIPPED | OUTPATIENT
Start: 2021-06-21 | End: 2021-11-30

## 2021-06-21 SDOH — ECONOMIC STABILITY: FOOD INSECURITY: WITHIN THE PAST 12 MONTHS, THE FOOD YOU BOUGHT JUST DIDN'T LAST AND YOU DIDN'T HAVE MONEY TO GET MORE.: NEVER TRUE

## 2021-06-21 SDOH — ECONOMIC STABILITY: FOOD INSECURITY: WITHIN THE PAST 12 MONTHS, YOU WORRIED THAT YOUR FOOD WOULD RUN OUT BEFORE YOU GOT MONEY TO BUY MORE.: NEVER TRUE

## 2021-06-21 ASSESSMENT — SOCIAL DETERMINANTS OF HEALTH (SDOH): HOW HARD IS IT FOR YOU TO PAY FOR THE VERY BASICS LIKE FOOD, HOUSING, MEDICAL CARE, AND HEATING?: NOT HARD AT ALL

## 2021-06-21 ASSESSMENT — PATIENT HEALTH QUESTIONNAIRE - PHQ9
SUM OF ALL RESPONSES TO PHQ QUESTIONS 1-9: 0
SUM OF ALL RESPONSES TO PHQ QUESTIONS 1-9: 0
1. LITTLE INTEREST OR PLEASURE IN DOING THINGS: 0
SUM OF ALL RESPONSES TO PHQ QUESTIONS 1-9: 0
SUM OF ALL RESPONSES TO PHQ9 QUESTIONS 1 & 2: 0
2. FEELING DOWN, DEPRESSED OR HOPELESS: 0

## 2021-06-21 NOTE — PATIENT INSTRUCTIONS
Stop the zoloft on 1/1/2022  Reduce the zoloft dose to 25 mg daily  See me in 6 months Dementia with behavioral disturbance, unspecified dementia type

## 2021-06-21 NOTE — PROGRESS NOTES
William Peralta (:  1945) is a 77 y.o. female,Established patient, here for evaluation of the following chief complaint(s):  Hyperlipidemia, Anxiety, and Leg Pain (left)         ASSESSMENT/PLAN:       Stop the zoloft on 2022  Reduce the zoloft dose to 25 mg daily  See me in 6 months    Subjective   SUBJECTIVE/OBJECTIVE:  HPI  1. There has been some soreness to the proximal lateral left lower leg. It's been a month and seems to be worsening  2. She will use some motrin and tylenol. 3. There is some soreness at the left hip too  Review of Systems       Objective   Physical Exam             An electronic signature was used to authenticate this note.     --Lti Santana MD

## 2021-07-12 ENCOUNTER — TELEPHONE (OUTPATIENT)
Dept: FAMILY MEDICINE CLINIC | Age: 76
End: 2021-07-12

## 2021-07-12 ENCOUNTER — HOSPITAL ENCOUNTER (EMERGENCY)
Age: 76
Discharge: HOME | End: 2021-07-12
Payer: MEDICARE

## 2021-07-12 VITALS
SYSTOLIC BLOOD PRESSURE: 117 MMHG | OXYGEN SATURATION: 96 % | HEART RATE: 72 BPM | DIASTOLIC BLOOD PRESSURE: 74 MMHG | RESPIRATION RATE: 16 BRPM | WEIGHT: 171 LBS | TEMPERATURE: 98.3 F | HEIGHT: 63 IN | BODY MASS INDEX: 30.3 KG/M2

## 2021-07-12 DIAGNOSIS — L23.7 POISON IVY DERMATITIS: Primary | ICD-10-CM

## 2021-07-12 PROCEDURE — 6360000002 HC RX W HCPCS: Performed by: NURSE PRACTITIONER

## 2021-07-12 PROCEDURE — 99213 OFFICE O/P EST LOW 20 MIN: CPT

## 2021-07-12 PROCEDURE — 96372 THER/PROPH/DIAG INJ SC/IM: CPT

## 2021-07-12 RX ORDER — PREDNISONE 20 MG/1
20 TABLET ORAL 2 TIMES DAILY
Qty: 10 TABLET | Refills: 0 | Status: SHIPPED | OUTPATIENT
Start: 2021-07-12 | End: 2021-07-17

## 2021-07-12 RX ORDER — METHYLPREDNISOLONE ACETATE 80 MG/ML
80 INJECTION, SUSPENSION INTRA-ARTICULAR; INTRALESIONAL; INTRAMUSCULAR; SOFT TISSUE ONCE
Status: COMPLETED | OUTPATIENT
Start: 2021-07-12 | End: 2021-07-12

## 2021-07-12 RX ORDER — BETAMETHASONE VALERATE 0.1 %
LOTION (ML) TOPICAL
Qty: 60 ML | Refills: 0 | Status: SHIPPED | OUTPATIENT
Start: 2021-07-12 | End: 2021-11-08

## 2021-07-12 RX ADMIN — METHYLPREDNISOLONE ACETATE 80 MG: 80 INJECTION, SUSPENSION INTRA-ARTICULAR; INTRALESIONAL; INTRAMUSCULAR; SOFT TISSUE at 16:58

## 2021-07-12 ASSESSMENT — ENCOUNTER SYMPTOMS
SHORTNESS OF BREATH: 0
NAUSEA: 0
VOMITING: 0
DIARRHEA: 0
SORE THROAT: 0
RHINORRHEA: 0
COUGH: 0
CHEST TIGHTNESS: 0

## 2021-07-12 NOTE — ED TRIAGE NOTES
Pt to [facility] ambulatory with a rash to her face, neck, and chest area. Rash started last week.

## 2021-07-12 NOTE — ED PROVIDER NOTES
[facility]  Urgent Care Encounter       CHIEF COMPLAINT       Chief Complaint   Patient presents with    Rash     face, neck, chest       Nurses Notes reviewed and I agree except as noted in the HPI. HISTORY OF PRESENT ILLNESS   Patricia Esquivel is a 77 y.o. female who presents to the [facility] urgent care for a rash. She reports that this started Wednesday. She reports that she was doing yard work where there was known poison ivy. She is noted to have rash noted on trunk, face, and bilateral arms. She reports pruritis. The history is provided by the patient. No  was used. REVIEW OF SYSTEMS     Review of Systems   Constitutional: Negative for activity change, appetite change, chills, fatigue and fever. HENT: Negative for ear discharge, ear pain, rhinorrhea and sore throat. Respiratory: Negative for cough, chest tightness and shortness of breath. Cardiovascular: Negative for chest pain. Gastrointestinal: Negative for diarrhea, nausea and vomiting. Genitourinary: Negative for dysuria. Skin: Positive for rash. Allergic/Immunologic: Negative for environmental allergies and food allergies. Neurological: Negative for dizziness and headaches. PAST MEDICAL HISTORY         Diagnosis Date    Adenomatous colon polyp 2006    Coronary artery disease involving native coronary artery of native heart without angina pectoris 5/6/2020    Fibrocystic breast disease 1990    Hyperlipidemia 1990    MVP (mitral valve prolapse) 1997    Osteopenia 02/2018    PONV (postoperative nausea and vomiting)     Vitamin D deficiency 03/2018       SURGICALHISTORY     Patient  has a past surgical history that includes ángela and bso (cervix removed) (1991); Colonoscopy (2011); Colonoscopy (2006); Tonsillectomy (1980); Hysterectomy; and Colonoscopy (01/25/2017).     CURRENT MEDICATIONS       Previous Medications    APIXABAN (ELIQUIS) 5 MG TABS TABLET    TAKE 1 TABLET TWICE A DAY    CHOLECALCIFEROL (VITAMIN D3) 5000 UNITS CAPS    Take 1 capsule by mouth Daily    ROSUVASTATIN (CRESTOR) 20 MG TABLET    Take 1 tablet by mouth daily    SERTRALINE (ZOLOFT) 25 MG TABLET    Take 1 tablet by mouth daily       ALLERGIES     Patient is has No Known Allergies. Patients   Immunization History   Administered Date(s) Administered    COVID-19, Moderna, PF, 100mcg/0.5mL 02/19/2021, 03/25/2021    Influenza, High-dose, Quadv, 65 yrs +, IM (Fluzone) 11/03/2020    Influenza, Quadv, IM, (6 mo and older Fluzone, Flulaval, Fluarix and 3 yrs and older Afluria) 10/11/2019    Pneumococcal Conjugate 13-valent ([id number]) 12/23/2019    Pneumococcal Polysaccharide ([id number]) 11/03/2020    Zoster Recombinant (Shingrix) 08/14/2018, 01/25/2019       FAMILY HISTORY     Patient's family history includes Cancer in her sister; Heart Disease in her mother; Stroke in her mother. SOCIAL HISTORY     Patient  reports that she quit smoking about 53 years ago. Her smoking use included cigarettes. She has a 2.00 pack-year smoking history. She has never used smokeless tobacco. She reports current alcohol use. She reports that she does not use drugs. PHYSICAL EXAM     ED TRIAGE VITALS  BP: 126/68, Temp: 98.3 °F (36.8 °C), Pulse: 70, Resp: 16, SpO2: 96 %,Estimated body mass index is 30.29 kg/m² as calculated from the following:    Height as of this encounter: 5' 3\" (1.6 m). Weight as of this encounter: 171 lb (77.6 kg). ,No LMP recorded. Patient has had a hysterectomy. Physical Exam  Vitals and nursing note reviewed. Constitutional:       General: She is not in acute distress. Appearance: Normal appearance. She is not ill-appearing, toxic-appearing or diaphoretic. HENT:      Head: Normocephalic. Right Ear: Ear canal and external ear normal.      Left Ear: Ear canal and external ear normal.      Nose: Nose normal. No congestion or rhinorrhea.       Mouth/Throat:      Mouth: Mucous membranes are moist.      Pharynx: Oropharynx is clear. No oropharyngeal exudate or posterior oropharyngeal erythema. Cardiovascular:      Rate and Rhythm: Normal rate. Pulses: Normal pulses. Pulmonary:      Effort: Pulmonary effort is normal. No respiratory distress. Breath sounds: No stridor. No wheezing or rhonchi. Abdominal:      General: Abdomen is flat. Bowel sounds are normal.      Palpations: Abdomen is soft. Musculoskeletal:         General: No swelling or tenderness. Normal range of motion. Cervical back: Normal range of motion. Skin:     Findings: Rash present. Rash is scaling. Neurological:      General: No focal deficit present. Mental Status: She is alert and oriented to person, place, and time. Psychiatric:         Mood and Affect: Mood normal.         Behavior: Behavior normal.         DIAGNOSTIC RESULTS     Labs:No results found for this visit on 07/12/21. IMAGING:    No orders to display         EKG: None      URGENT CARE COURSE:     Vitals:    07/12/21 1643   BP: 126/68   Pulse: 70   Resp: 16   Temp: 98.3 °F (36.8 °C)   TempSrc: Temporal   SpO2: 96%   Weight: 171 lb (77.6 kg)   Height: 5' 3\" (1.6 m)       Medications   methylPREDNISolone acetate (DEPO-MEDROL) injection 80 mg (80 mg Intramuscular Given 7/12/21 1658)            PROCEDURES:  None    FINAL IMPRESSION      1. Poison ivy dermatitis          DISPOSITION/ PLAN     Patient seen and evaluated for rash. Consistent with poison ivy dermatitis. She is givin DepoMedrol while at urgent care. She is prescribed prednisone and Valisone cream.  She is instructed to follow-up with PCP in 3 to 5 days with continued symptoms. She is agreeable to the above plan and denies questions or concerns at this time.       PATIENT REFERRED TO:  Blair Ruth MD  [address] [place] [address] / [facility] JAYDA ROBERTS [facility] [id number]      DISCHARGE MEDICATIONS:  New Prescriptions    BETAMETHASONE VALERATE (VALISONE) 0.1 % LOTION    Apply topically 2 times daily.     PREDNISONE (DELTASONE) 20 MG TABLET    Take 1 tablet by mouth 2 times daily for 5 days       Discontinued Medications    METOPROLOL TARTRATE (LOPRESSOR) 25 MG TABLET    TAKE ONE-HALF (1/2) TABLET TWICE A DAY       Current Discharge Medication List          FELICITAS Mahajan CNP    (Please note that portions of this note were completed with a voice recognition program. Efforts were made to edit the dictations but occasionally words are mis-transcribed.)           FELICITAS Mahajan CNP  07/12/21 [id number]

## 2021-07-12 NOTE — TELEPHONE ENCOUNTER
Pt called said that she started with poison  Ivy, bottom of face, some near eye. Started to go above lip, arms and fingers. I checked with nurse and she said that should have pt go to urgent care since it is that close to her eye. Pt informed.

## 2021-11-08 ENCOUNTER — OFFICE VISIT (OUTPATIENT)
Dept: CARDIOLOGY CLINIC | Age: 76
End: 2021-11-08
Payer: MEDICARE

## 2021-11-08 VITALS
WEIGHT: 179.6 LBS | SYSTOLIC BLOOD PRESSURE: 125 MMHG | BODY MASS INDEX: 31.82 KG/M2 | HEIGHT: 63 IN | DIASTOLIC BLOOD PRESSURE: 74 MMHG | HEART RATE: 70 BPM

## 2021-11-08 DIAGNOSIS — I25.10 CORONARY ARTERY DISEASE INVOLVING NATIVE CORONARY ARTERY OF NATIVE HEART WITHOUT ANGINA PECTORIS: ICD-10-CM

## 2021-11-08 DIAGNOSIS — E78.00 PURE HYPERCHOLESTEROLEMIA: ICD-10-CM

## 2021-11-08 DIAGNOSIS — I48.0 PAROXYSMAL ATRIAL FIBRILLATION (HCC): Primary | ICD-10-CM

## 2021-11-08 PROCEDURE — 99214 OFFICE O/P EST MOD 30 MIN: CPT | Performed by: INTERNAL MEDICINE

## 2021-11-08 PROCEDURE — 4040F PNEUMOC VAC/ADMIN/RCVD: CPT | Performed by: INTERNAL MEDICINE

## 2021-11-08 PROCEDURE — G8427 DOCREV CUR MEDS BY ELIG CLIN: HCPCS | Performed by: INTERNAL MEDICINE

## 2021-11-08 PROCEDURE — G8484 FLU IMMUNIZE NO ADMIN: HCPCS | Performed by: INTERNAL MEDICINE

## 2021-11-08 PROCEDURE — G8417 CALC BMI ABV UP PARAM F/U: HCPCS | Performed by: INTERNAL MEDICINE

## 2021-11-08 PROCEDURE — 1036F TOBACCO NON-USER: CPT | Performed by: INTERNAL MEDICINE

## 2021-11-08 PROCEDURE — G8399 PT W/DXA RESULTS DOCUMENT: HCPCS | Performed by: INTERNAL MEDICINE

## 2021-11-08 PROCEDURE — 1090F PRES/ABSN URINE INCON ASSESS: CPT | Performed by: INTERNAL MEDICINE

## 2021-11-08 PROCEDURE — 1123F ACP DISCUSS/DSCN MKR DOCD: CPT | Performed by: INTERNAL MEDICINE

## 2021-11-08 PROCEDURE — 3017F COLORECTAL CA SCREEN DOC REV: CPT | Performed by: INTERNAL MEDICINE

## 2021-11-08 NOTE — PROGRESS NOTES
Chief Complaint   Patient presents with    6 Month Follow-Up    Coronary Artery Disease     Originally Patient is here due to abnormal monitor strips. Seen by dr. Shankar Dumont for palpitation and event show some abnormal Rhythm and hence came to see me here -Dr. Shankar Dumont out today  Dr. Aravind Hinojosa office called would like patient seen for abnormal monitor strips, notified Dr. Mayco Fox out this week would like another physician to address. Appt made for 3-27-18, patient declined she will come Thursday           6 month fu    EKG 5-10-21    Orthostatic BP done  Sob on exertion    Hx of dizziness better    Denied cp, dizziness, palpitations and edema    EKG done 3-    Never had syncope        Patient Active Problem List   Diagnosis    Pure hypercholesterolemia    Adenomatous polyp of colon    Vitamin D deficiency    Osteopenia    Palpitations    Paroxysmal atrial fibrillation (HCC)    History of cardiac catheterization    BPV (benign positional vertigo), right    Dyslipidemia    Coronary artery disease involving native coronary artery of native heart without angina pectoris    Postmenopausal state       Past Surgical History:   Procedure Laterality Date    COLONOSCOPY      Wisser    COLONOSCOPY      Wisser    COLONOSCOPY  2017    Dr. Jeannie Duarte       No Known Allergies     Family History   Problem Relation Age of Onset    Stroke Mother     Heart Disease Mother     Cancer Sister         Social History     Socioeconomic History    Marital status:       Spouse name: Not on file    Number of children: Not on file    Years of education: Not on file    Highest education level: Not on file   Occupational History    Not on file   Tobacco Use    Smoking status: Former Smoker     Packs/day: 0.50     Years: 4.00     Pack years: 2.00     Types: Cigarettes     Quit date:      Years since quittin.8    Smokeless tobacco: Never Used   Vaping Use    Vaping Use: Never used   Substance and Sexual Activity    Alcohol use: Yes     Comment: occsional 2-3 times a month    Drug use: No    Sexual activity: Not on file   Other Topics Concern    Not on file   Social History Narrative    Not on file     Social Determinants of Health     Financial Resource Strain: Low Risk     Difficulty of Paying Living Expenses: Not hard at all   Food Insecurity: No Food Insecurity    Worried About [address] in the Last Year: Never true    [facility] in the Last Year: Never true   Transportation Needs:     Lack of Transportation (Medical): Not on file    Lack of Transportation (Non-Medical):  Not on file   Physical Activity:     Days of Exercise per Week: Not on file    Minutes of Exercise per Session: Not on file   Stress:     Feeling of Stress : Not on file   Social Connections:     Frequency of Communication with Friends and Family: Not on file    Frequency of Social Gatherings with Friends and Family: Not on file    Attends [employer] Services: Not on file    Active Member of [address] [place] [address] or Organizations: Not on file    Attends Club or Organization Meetings: Not on file    Marital Status: Not on file   Intimate Partner Violence:     Fear of Current or Ex-Partner: Not on file    Emotionally Abused: Not on file    Physically Abused: Not on file    Sexually Abused: Not on file   Housing Stability:     Unable to Pay for Housing in the Last Year: Not on file    Number of [place] in the Last Year: Not on file    Unstable Housing in the Last Year: Not on file       Current Outpatient Medications   Medication Sig Dispense Refill    sertraline (ZOLOFT) 25 MG tablet Take 1 tablet by mouth daily 90 tablet 1    rosuvastatin (CRESTOR) 20 MG tablet Take 1 tablet by mouth daily 90 tablet 3    apixaban (ELIQUIS) 5 MG TABS tablet TAKE 1 TABLET TWICE A  tablet 3    Cholecalciferol (VITAMIN D3) 5000 units CAPS Take 1 capsule by mouth Daily (Patient taking differently: Take 1 capsule by mouth See Admin Instructions 3 times a week)       No current facility-administered medications for this visit. Review of Systems -     General ROS: negative  Psychological ROS: negative  Hematological and Lymphatic ROS: No history of blood clots or bleeding disorder. Respiratory ROS: no cough, shortness of breath, or wheezing  Cardiovascular ROS: no chest pain or dyspnea on exertion  Gastrointestinal ROS: negative  Genito-Urinary ROS: no dysuria, trouble voiding, or hematuria  Musculoskeletal ROS: negative  Neurological ROS: no TIA or stroke symptoms  Dermatological ROS: negative      Blood pressure 125/74, pulse 70, height 5' 3\" (1.6 m), weight 179 lb 9.6 oz (81.5 kg), not currently breastfeeding. Physical Examination:    General appearance - alert, well appearing, and in no distress  Mental status - alert, oriented to person, place, and time  Neck - supple, no significant adenopathy, no JVD, or carotid bruits  Chest - clear to auscultation, no wheezes, rales or rhonchi, symmetric air entry  Heart - normal rate, regular rhythm, normal S1, S2, no murmurs, rubs, clicks or gallops  Abdomen - soft, nontender, nondistended, no masses or organomegaly  Neurological - alert, oriented, normal speech, no focal findings or movement disorder noted  Musculoskeletal - no joint tenderness, deformity or swelling  Extremities - peripheral pulses normal, no pedal edema, no clubbing or cyanosis  Skin - normal coloration and turgor, no rashes, no suspicious skin lesions noted    Lab  No results for input(s): CKTOTAL, CKMB, CKMBINDEX, TROPONINI in the last 72 hours.   CBC:   Lab Results   Component Value Date    WBC 6.9 05/09/2018    RBC 4.67 05/09/2018    HGB 13.5 05/09/2018    HCT 39.8 05/09/2018    MCV 85.1 05/09/2018    MCH 29.0 05/09/2018    MCHC 34.0 05/09/2018    RDW 12.8 05/09/2018     05/09/2018    MPV 8.3 05/09/2018     BMP:    Lab Results Component Value Date     06/03/2021    K 4.2 06/03/2021    K 3.9 05/09/2018     06/03/2021    CO2 26 06/03/2021    BUN 17 06/03/2021    LABALBU 4.6 06/03/2021    CREATININE 0.9 06/03/2021    CALCIUM 10.0 06/03/2021    LABGLOM 61 06/03/2021    GLUCOSE 97 05/09/2018     Hepatic Function Panel:    Lab Results   Component Value Date    ALKPHOS 81 06/03/2021    ALT 11 06/03/2021    AST 15 06/03/2021    PROT 7.5 06/03/2021    BILITOT 0.6 06/03/2021    BILIDIR <0.2 06/03/2021    LABALBU 4.6 06/03/2021     Magnesium:  No results found for: MG  Warfarin PT/INR:  No components found for: PTPATWAR, PTINRWAR  HgBA1c:  No results found for: LABA1C  FLP:    Lab Results   Component Value Date    TRIG 145 06/03/2021    HDL 54 06/03/2021    LDLCALC 104 06/03/2021    LDLDIRECT 114.67 06/03/2021     TSH:  No results found for: TSH    Event Monitor reviewed  Revealed two episodes of atr fib with  to 150's on 03/22  For 2.5 strip and 03/23/2018 and 1 strip  And pat did not have  Any symptom while having it      Conclusions      Summary   Nonspecific ST-T wave changes.   No ischemic EKG changes.   There is mild attenuation artifact noted in the anterior wall seems to be   related to breast artifact.  Toby Rafael was a small to moderate sized, mildly severe, partially reversible   myocardial perfusion defect of the anterior wall.   There was mild degree of ischemia in the anterior wall.      Recommendation   Clinical correlation is recommended due to poor image quality.      Signatures      ----------------------------------------------------------------   Electronically signed by Nataly Howard MD (Interpreting   Cardiologist) on 04/18/2018 at 20:15        Cath   Conclusions    Procedure Summary   LM-PATENT   LAD PATENT   D2 OSTIAL 50% STENOSIS   LCX-PATENT   RCA- MID 35 TO 40% DIFFUSE LESIONS   Normal LV systolic function. LVEF approximately 60% .    EDP 14 mmhg   No MR   NO transaortic Gradient      Recommendations Continue with aggressive risk factor modification and medical therapy. Estimated Blood Loss:5 ml. Complications:No complications. Signatures    ----------------------------------------------------------------   Electronically signed by Liz Barrera MD (Performing   Physician) on 05/09/2018 at 12:10   ----------------------------------------------------------------         EKG NSR , no acute abn 5/6/2020    Ekg 5/10/21  Sinus  Rhythm   Low voltage -possible pulmonary disease. ABNORMAL         Assessment       Diagnosis Orders   1. Paroxysmal atrial fibrillation (HCC)     2. Pure hypercholesterolemia     3. Coronary artery disease involving native coronary artery of native heart without angina pectoris         Plan     The current meds and labs reviewed    Continue the current treatment and with constant vigilance to changes in symptoms and also any potential side effects. Return for care or seek medical attention immediately if symptoms got worse and/or develop new symptoms. PAF- noted on event monitor  Chads2-vasc=2  (age and female sex)  No dizziness   Doing well with lopressor  Cont  apixaban 5 mg po bid  CBC- Okay  Risk of OA has been informed including but not limited ICH  EPS -Dr. Lilia Gunn recommend med RX for now  Consider ILR?? Or event repeat    Hx of Dizziness and palpitation- better  Abn nuc stress- but cath nonrevealing  Bedside orthostatic eval- negative    Hx of BPV  More in night when in bed and turns to Right  Vestibular rehabilation    Moderate Nonobstructive CAD  On med Rx     Hyperlipidemia: on statins, followed periodically. Patient need periodic lipid and liver profile. Cont crestor    Lipid panel and liver function test before next appointment  pcp to do    Discussed use, benefit, and side effects of prescribed medications. All patient questions answered. Pt voiced understanding. Instructed to continue current medications, diet and exercise.  Continue risk factor modification and medical management. Patient agreed with treatment plan. Follow up as directed.       RTC  12 months      [facility]

## 2021-11-15 DIAGNOSIS — E78.00 PURE HYPERCHOLESTEROLEMIA: ICD-10-CM

## 2021-11-15 RX ORDER — ROSUVASTATIN CALCIUM 20 MG/1
TABLET, COATED ORAL
Qty: 90 TABLET | Refills: 3 | Status: SHIPPED | OUTPATIENT
Start: 2021-11-15

## 2021-11-23 NOTE — TELEPHONE ENCOUNTER
Concha Dakins called requesting a refill on the following medications:  Requested Prescriptions     Pending Prescriptions Disp Refills    metoprolol tartrate (LOPRESSOR) 25 MG tablet 90 tablet 0     [facility]   . sami      Date of last visit:   Date of next visit (if applicable): Visit date not found

## 2021-11-30 DIAGNOSIS — F41.9 ANXIETY DISORDER, UNSPECIFIED TYPE: ICD-10-CM

## 2021-11-30 RX ORDER — SERTRALINE HYDROCHLORIDE 25 MG/1
TABLET, FILM COATED ORAL
Qty: 90 TABLET | Refills: 3 | Status: SHIPPED | OUTPATIENT
Start: 2021-11-30 | End: 2022-10-29

## 2021-11-30 NOTE — TELEPHONE ENCOUNTER
Date of last visit:  6/21/2021   Date of next visit:  12/27/2021    Requested Prescriptions     Pending Prescriptions Disp Refills    sertraline (ZOLOFT) 25 MG tablet [Pharmacy Med Name: SERTRALINE HCL TABS 25MG] 90 tablet 3     Sig: TAKE 1 TABLET DAILY

## 2021-12-10 ENCOUNTER — HOSPITAL ENCOUNTER (OUTPATIENT)
Age: 76
Discharge: HOME | End: 2021-12-10
Payer: MEDICARE

## 2021-12-10 DIAGNOSIS — E78.00 PURE HYPERCHOLESTEROLEMIA: ICD-10-CM

## 2021-12-10 DIAGNOSIS — E55.9 VITAMIN D DEFICIENCY: ICD-10-CM

## 2021-12-10 LAB
LDL CHOLESTEROL DIRECT: 168.41 MG/DL
VITAMIN D 25-HYDROXY: 41 NG/ML (ref 30–100)

## 2021-12-10 PROCEDURE — 82306 VITAMIN D 25 HYDROXY: CPT

## 2021-12-10 PROCEDURE — 83721 ASSAY OF BLOOD LIPOPROTEIN: CPT

## 2021-12-10 PROCEDURE — 36415 COLL VENOUS BLD VENIPUNCTURE: CPT

## 2021-12-20 ASSESSMENT — LIFESTYLE VARIABLES
HAVE YOU OR SOMEONE ELSE BEEN INJURED AS A RESULT OF YOUR DRINKING: NO
HOW OFTEN DURING THE LAST YEAR HAVE YOU FAILED TO DO WHAT WAS NORMALLY EXPECTED FROM YOU BECAUSE OF DRINKING: 0
AUDIT-C TOTAL SCORE: 0
HOW OFTEN DURING THE LAST YEAR HAVE YOU HAD A FEELING OF GUILT OR REMORSE AFTER DRINKING: NEVER
HOW OFTEN DURING THE LAST YEAR HAVE YOU FOUND THAT YOU WERE NOT ABLE TO STOP DRINKING ONCE YOU HAD STARTED: NEVER
HOW OFTEN DURING THE LAST YEAR HAVE YOU FOUND THAT YOU WERE NOT ABLE TO STOP DRINKING ONCE YOU HAD STARTED: 0
HOW OFTEN DURING THE LAST YEAR HAVE YOU HAD A FEELING OF GUILT OR REMORSE AFTER DRINKING: 0
HOW OFTEN DURING THE LAST YEAR HAVE YOU NEEDED AN ALCOHOLIC DRINK FIRST THING IN THE MORNING TO GET YOURSELF GOING AFTER A NIGHT OF HEAVY DRINKING: 0
AUDIT TOTAL SCORE: 0
HOW MANY STANDARD DRINKS CONTAINING ALCOHOL DO YOU HAVE ON A TYPICAL DAY: ONE OR TWO
HOW OFTEN DO YOU HAVE SIX OR MORE DRINKS ON ONE OCCASION: 0
HOW OFTEN DURING THE LAST YEAR HAVE YOU BEEN UNABLE TO REMEMBER WHAT HAPPENED THE NIGHT BEFORE BECAUSE YOU HAD BEEN DRINKING: 0
HOW OFTEN DURING THE LAST YEAR HAVE YOU FAILED TO DO WHAT WAS NORMALLY EXPECTED FROM YOU BECAUSE OF DRINKING: NEVER
AUDIT-C TOTAL SCORE: 1
HAVE YOU OR SOMEONE ELSE BEEN INJURED AS A RESULT OF YOUR DRINKING: 0
HOW OFTEN DO YOU HAVE SIX OR MORE DRINKS ON ONE OCCASION: NEVER
HOW OFTEN DURING THE LAST YEAR HAVE YOU BEEN UNABLE TO REMEMBER WHAT HAPPENED THE NIGHT BEFORE BECAUSE YOU HAD BEEN DRINKING: NEVER
HAS A RELATIVE, FRIEND, DOCTOR, OR ANOTHER HEALTH PROFESSIONAL EXPRESSED CONCERN ABOUT YOUR DRINKING OR SUGGESTED YOU CUT DOWN: 0
HOW OFTEN DO YOU HAVE A DRINK CONTAINING ALCOHOL: 1
HOW MANY STANDARD DRINKS CONTAINING ALCOHOL DO YOU HAVE ON A TYPICAL DAY: 0
HOW OFTEN DURING THE LAST YEAR HAVE YOU NEEDED AN ALCOHOLIC DRINK FIRST THING IN THE MORNING TO GET YOURSELF GOING AFTER A NIGHT OF HEAVY DRINKING: NEVER
AUDIT TOTAL SCORE: 1
HAS A RELATIVE, FRIEND, DOCTOR, OR ANOTHER HEALTH PROFESSIONAL EXPRESSED CONCERN ABOUT YOUR DRINKING OR SUGGESTED YOU CUT DOWN: NO
HOW OFTEN DO YOU HAVE A DRINK CONTAINING ALCOHOL: MONTHLY OR LESS

## 2021-12-20 ASSESSMENT — PATIENT HEALTH QUESTIONNAIRE - PHQ9
SUM OF ALL RESPONSES TO PHQ9 QUESTIONS 1 & 2: 0
SUM OF ALL RESPONSES TO PHQ QUESTIONS 1-9: 0
1. LITTLE INTEREST OR PLEASURE IN DOING THINGS: 0
SUM OF ALL RESPONSES TO PHQ QUESTIONS 1-9: 0
SUM OF ALL RESPONSES TO PHQ QUESTIONS 1-9: 0
2. FEELING DOWN, DEPRESSED OR HOPELESS: 0

## 2021-12-27 ENCOUNTER — OFFICE VISIT (OUTPATIENT)
Dept: FAMILY MEDICINE CLINIC | Age: 76
End: 2021-12-27

## 2021-12-27 VITALS
HEART RATE: 68 BPM | RESPIRATION RATE: 12 BRPM | BODY MASS INDEX: 33.03 KG/M2 | HEIGHT: 62 IN | SYSTOLIC BLOOD PRESSURE: 118 MMHG | TEMPERATURE: 97.3 F | DIASTOLIC BLOOD PRESSURE: 60 MMHG | WEIGHT: 179.5 LBS

## 2021-12-27 DIAGNOSIS — Z78.0 POST-MENOPAUSAL: Primary | ICD-10-CM

## 2021-12-27 DIAGNOSIS — Z12.31 VISIT FOR SCREENING MAMMOGRAM: ICD-10-CM

## 2021-12-27 DIAGNOSIS — Z00.00 ROUTINE GENERAL MEDICAL EXAMINATION AT A HEALTH CARE FACILITY: ICD-10-CM

## 2021-12-27 PROCEDURE — 1036F TOBACCO NON-USER: CPT | Performed by: FAMILY MEDICINE

## 2021-12-27 PROCEDURE — G0439 PPPS, SUBSEQ VISIT: HCPCS | Performed by: FAMILY MEDICINE

## 2021-12-27 PROCEDURE — 4040F PNEUMOC VAC/ADMIN/RCVD: CPT | Performed by: FAMILY MEDICINE

## 2021-12-27 PROCEDURE — 1090F PRES/ABSN URINE INCON ASSESS: CPT | Performed by: FAMILY MEDICINE

## 2021-12-27 PROCEDURE — 1123F ACP DISCUSS/DSCN MKR DOCD: CPT | Performed by: FAMILY MEDICINE

## 2021-12-27 PROCEDURE — 3017F COLORECTAL CA SCREEN DOC REV: CPT | Performed by: FAMILY MEDICINE

## 2021-12-27 PROCEDURE — G8427 DOCREV CUR MEDS BY ELIG CLIN: HCPCS | Performed by: FAMILY MEDICINE

## 2021-12-27 PROCEDURE — G8417 CALC BMI ABV UP PARAM F/U: HCPCS | Performed by: FAMILY MEDICINE

## 2021-12-27 PROCEDURE — G8399 PT W/DXA RESULTS DOCUMENT: HCPCS | Performed by: FAMILY MEDICINE

## 2021-12-27 NOTE — PROGRESS NOTES
Medicare Annual Wellness Visit  Name: Uriah Starks Date: 2021   MRN: [id number] Sex: Female   Age: 77 y.o. Ethnicity: Non- / Non    : 1945 Race: White (non-)      Eris Glover is here for Medicare AWV    Screenings for behavioral, psychosocial and functional/safety risks, and cognitive dysfunction are all negative except as indicated below. These results, as well as other patient data from the [address] form, are documented in Flowsheets linked to this Encounter. No Known Allergies    Prior to Visit Medications    Medication Sig Taking?  Authorizing Provider   sertraline (ZOLOFT) 25 MG tablet TAKE 1 TABLET DAILY  Rosalind Maldonado MD   metoprolol tartrate (LOPRESSOR) 25 MG tablet TAKE ONE-HALF (1/2) TABLET TWICE A DAY  Teresa Soriano MD   rosuvastatin (CRESTOR) 20 MG tablet TAKE 1 TABLET DAILY  FELICITAS Morrissey CNP   apixaban (ELIQUIS) 5 MG TABS tablet TAKE 1 TABLET TWICE A DAY  FELICITAS Cruz CNP   Cholecalciferol (VITAMIN D3) 5000 units CAPS Take 1 capsule by mouth Daily  Patient taking differently: Take 1 capsule by mouth See Admin Instructions 3 times a week  Rosalind Maldonado MD       Past Medical History:   Diagnosis Date    Adenomatous colon polyp     Coronary artery disease involving native coronary artery of native heart without angina pectoris 2020    Fibrocystic breast disease     Hyperlipidemia     MVP (mitral valve prolapse) 1997    Osteopenia 2018    PONV (postoperative nausea and vomiting)     Vitamin D deficiency 2018       Past Surgical History:   Procedure Laterality Date    COLONOSCOPY      Wisser    COLONOSCOPY      Wisser    COLONOSCOPY  2017    Dr. Mauricio Roberts       Family History   Problem Relation Age of Onset    Stroke Mother     Heart Disease Mother     Cancer Sister        CareTeam (Including outside providers/suppliers regularly involved in providing care):   Patient Care Team:  Carlos Car MD as PCP - General (Family Medicine)  Carlos Car MD as PCP - [facility] Provider    Wt Readings from Last 3 Encounters:   12/27/21 179 lb 8 oz (81.4 kg)   11/08/21 179 lb 9.6 oz (81.5 kg)   07/12/21 171 lb (77.6 kg)     Vitals:    12/27/21 0903   BP: 118/60   Site: Right Upper Arm   Position: Sitting   Cuff Size: Medium Adult   Pulse: 68   Resp: 12   Temp: 97.3 °F (36.3 °C)   TempSrc: Skin   Weight: 179 lb 8 oz (81.4 kg)   Height: 5' 2\" (1.575 m)     Body mass index is 32.83 kg/m². Based upon direct observation of the patient, evaluation of cognition reveals recent and remote memory intact.       1. She states she plans on going off the zoloft soon  General Appearance: alert and oriented to person, place and time, well-developed and well-nourished, in no acute distress  Skin: warm and dry, no rash or erythema  Head: normocephalic and atraumatic  Eyes: pupils equal, round, and reactive to light, extraocular eye movements intact, conjunctivae normal  ENT: tympanic membrane, external ear and ear canal normal bilaterally, oropharynx clear and moist with normal mucous membranes  Neck: neck supple and non tender without mass, no thyromegaly or thyroid nodules, no cervical lymphadenopathy   Pulmonary/Chest: clear to auscultation bilaterally- no wheezes, rales or rhonchi, normal air movement, no respiratory distress  Cardiovascular: normal rate, normal S1 and S2, no murmurs, no gallops, intact distal pulses and no carotid bruits  Abdomen: soft, non-tender, non-distended, normal bowel sounds, no masses or organomegaly  Extremities: no cyanosis, no clubbing and no edema  Musculoskeletal: normal range of motion, no joint swelling, deformity or tenderness  Neurologic: gait and coordination normal and speech normal    Patient's complete Health Risk Assessment and screening values have been reviewed and are found in [facility]. The following problems were reviewed today and where indicated follow up appointments were made and/or referrals ordered. Positive Risk Factor Screenings with Interventions:            General Health and ACP:  General  In general, how would you say your health is?: Very Good  In the past 7 days, have you experienced any of the following?  New or Increased Pain, New or Increased Fatigue, Loneliness, Social Isolation, Stress or Anger?: None of These  Do you get the social and emotional support that you need?: Yes  Do you have a Living Will?: Yes  Advance Directives     Power of  Living Will ACP-Advance Directive ACP-Power of     Not on File Not on File Not on File Not on File      General Health Risk Interventions:  · the answers were reviewed    Health Habits/Nutrition:  Health Habits/Nutrition  Do you exercise for at least 20 minutes 2-3 times per week?: (!) No  Have you lost any weight without trying in the past 3 months?: No  Do you eat only one meal per day?: No  Have you seen the dentist within the past year?: Yes  Body mass index: (!) 32.83  Health Habits/Nutrition Interventions:  · we discussed just walking for 20 minutes    Hearing/Vision:  No exam data present  Hearing/Vision  Do you or your family notice any trouble with your hearing that hasn't been managed with hearing aids?: No  Do you have difficulty driving, watching TV, or doing any of your daily activities because of your eyesight?: No  Have you had an eye exam within the past year?: (!) No  Hearing/Vision Interventions:  · Vision concerns:  patient encouraged to make appointment with his/her eye specialist        Personalized Preventive Plan   Current Health Maintenance Status  Immunization History   Administered Date(s) Administered    COVID-19, Gaston Cleary, Primary or Immunocompromised, PF, 100mcg/0.5mL 02/19/2021, 03/25/2021    Influenza, High-dose, Quadv, 65 yrs +, IM (Fluzone) 11/03/2020  Influenza, Quadv, IM, (6 mo and older Fluzone, Flulaval, Fluarix and 3 yrs and older Afluria) 10/11/2019    Pneumococcal Conjugate 13-valent ([id number]) 12/23/2019    Pneumococcal Polysaccharide ([id number]) 11/03/2020    Zoster Recombinant (Shingrix) 08/14/2018, 01/25/2019        Health Maintenance   Topic Date Due    Hepatitis C screen  Never done    DTaP/Tdap/Td vaccine (1 - Tdap) Never done   [employer] Visit (AWV)  06/25/2021    COVID-19 Vaccine (3 - Booster for Moderna series) 09/25/2021    Lipid screen  12/10/2022    Colon cancer screen colonoscopy  01/25/2027    DEXA (modify frequency per FRAX score)  Completed    Flu vaccine  Completed    Shingles Vaccine  Completed    Pneumococcal 65+ years Vaccine  Completed    Hepatitis A vaccine  Aged Out    Hepatitis B vaccine  Aged Out    Hib vaccine  Aged Out    Meningococcal (ACWY) vaccine  Aged Out     Recommendations for DNage Due: see orders and patient instructions/AVS.  . Recommended screening schedule for the next 5-10 years is provided to the patient in written form: see Patient Instructions/AVS.    There are no diagnoses linked to this encounter.

## 2021-12-27 NOTE — PATIENT INSTRUCTIONS
Personalized Preventive Plan for Faustino Torres - 12/27/2021  Medicare offers a range of preventive health benefits. Some of the tests and screenings are paid in full while other may be subject to a deductible, co-insurance, and/or copay. Some of these benefits include a comprehensive review of your medical history including lifestyle, illnesses that may run in your family, and various assessments and screenings as appropriate. After reviewing your medical record and screening and assessments performed today your provider may have ordered immunizations, labs, imaging, and/or referrals for you. A list of these orders (if applicable) as well as your Preventive Care list are included within your After Visit Summary for your review. Other Preventive Recommendations:    · A preventive eye exam performed by an eye specialist is recommended every 1-2 years to screen for glaucoma; cataracts, macular degeneration, and other eye disorders. · A preventive dental visit is recommended every 6 months. · Try to get at least 150 minutes of exercise per week or 10,000 steps per day on a pedometer . · Order or download the FREE \"Exercise & Physical Activity: Your Everyday Guide\" from [employer]. Call 0-944.533.5441 or search [employer] online. · You need 2689-7021 mg of calcium and 2976-3952 IU of vitamin D per day. It is possible to meet your calcium requirement with diet alone, but a vitamin D supplement is usually necessary to meet this goal.  · When exposed to the sun, use a sunscreen that protects against both UVA and UVB radiation with an SPF of 30 or greater. Reapply every 2 to 3 hours or after sweating, drying off with a towel, or swimming. · Always wear a seat belt when traveling in a car. Always wear a helmet when riding a bicycle or motorcycle.

## 2021-12-28 ENCOUNTER — TELEPHONE (OUTPATIENT)
Dept: FAMILY MEDICINE CLINIC | Age: 76
End: 2021-12-28

## 2021-12-28 NOTE — TELEPHONE ENCOUNTER
Mammogram and dexa scheduled for 2/21/2022 at 820am at [facility]. Patient to arrive at 8am go to [address] [place] [address] no perfume lotion, north. No multivitamin or ca for 24hrs. MOM to return call to office

## 2022-02-21 ENCOUNTER — HOSPITAL ENCOUNTER (OUTPATIENT)
Dept: WOMENS IMAGING | Age: 77
Discharge: HOME | End: 2022-02-21
Payer: MEDICARE

## 2022-02-21 DIAGNOSIS — Z78.0 POST-MENOPAUSAL: ICD-10-CM

## 2022-02-21 DIAGNOSIS — Z12.31 VISIT FOR SCREENING MAMMOGRAM: ICD-10-CM

## 2022-02-21 PROCEDURE — 77080 DXA BONE DENSITY AXIAL: CPT

## 2022-02-21 PROCEDURE — 77063 BREAST TOMOSYNTHESIS BI: CPT

## 2022-02-23 ENCOUNTER — TELEPHONE (OUTPATIENT)
Dept: FAMILY MEDICINE CLINIC | Age: 77
End: 2022-02-23

## 2022-02-23 NOTE — TELEPHONE ENCOUNTER
----- Message from Juwan Holder MD sent at 2/22/2022  9:33 PM EST -----  Let her know that the bone density is a little worse from before but still not osteoporosis. Ask her how is she taking the vit D and is she on a calcium pill everyday?

## 2022-02-23 NOTE — TELEPHONE ENCOUNTER
Pt informed and she said that she takes Vit D3 5000 units every four days and she does not take with food. She was told by her Heart Doctor to not take Vit D everyday as it was too much. He reduced her to this. She also does not take Calcium either.

## 2022-02-24 NOTE — TELEPHONE ENCOUNTER
So continue the vit D as the cardiologist suggests but it would be good to take one of the over the counter calcium pills twice daily. They are usually 500 to 600 mg each.

## 2022-06-10 ENCOUNTER — HOSPITAL ENCOUNTER (EMERGENCY)
Age: 77
Discharge: HOME | End: 2022-06-10
Payer: MEDICARE

## 2022-06-10 VITALS
SYSTOLIC BLOOD PRESSURE: 148 MMHG | HEIGHT: 62 IN | TEMPERATURE: 96.8 F | OXYGEN SATURATION: 98 % | HEART RATE: 78 BPM | WEIGHT: 170 LBS | RESPIRATION RATE: 14 BRPM | DIASTOLIC BLOOD PRESSURE: 78 MMHG | BODY MASS INDEX: 31.28 KG/M2

## 2022-06-10 DIAGNOSIS — L24.7 IRRITANT CONTACT DERMATITIS DUE TO PLANTS, EXCEPT FOOD: Primary | ICD-10-CM

## 2022-06-10 PROCEDURE — 99213 OFFICE O/P EST LOW 20 MIN: CPT

## 2022-06-10 PROCEDURE — 96372 THER/PROPH/DIAG INJ SC/IM: CPT

## 2022-06-10 PROCEDURE — 6360000002 HC RX W HCPCS: Performed by: NURSE PRACTITIONER

## 2022-06-10 PROCEDURE — 99213 OFFICE O/P EST LOW 20 MIN: CPT | Performed by: NURSE PRACTITIONER

## 2022-06-10 RX ORDER — METHYLPREDNISOLONE ACETATE 80 MG/ML
80 INJECTION, SUSPENSION INTRA-ARTICULAR; INTRALESIONAL; INTRAMUSCULAR; SOFT TISSUE ONCE
Status: COMPLETED | OUTPATIENT
Start: 2022-06-10 | End: 2022-06-10

## 2022-06-10 RX ORDER — M-VIT,TX,IRON,MINS/CALC/FOLIC 27MG-0.4MG
1 TABLET ORAL DAILY
COMMUNITY

## 2022-06-10 RX ADMIN — METHYLPREDNISOLONE ACETATE 80 MG: 80 INJECTION, SUSPENSION INTRA-ARTICULAR; INTRALESIONAL; INTRAMUSCULAR; SOFT TISSUE at 09:56

## 2022-06-10 ASSESSMENT — ENCOUNTER SYMPTOMS
NAUSEA: 0
VOMITING: 0
EYE REDNESS: 0
DIARRHEA: 0
COUGH: 0
SHORTNESS OF BREATH: 0
EYE ITCHING: 0
ABDOMINAL PAIN: 0

## 2022-06-10 ASSESSMENT — PAIN - FUNCTIONAL ASSESSMENT: PAIN_FUNCTIONAL_ASSESSMENT: NONE - DENIES PAIN

## 2022-06-10 NOTE — ED PROVIDER NOTES
[address] Amy Harden       Chief Complaint   Patient presents with    Rash     face abdomin       Nurses Notes reviewed and I agree except as noted in the HPI. HISTORY OF PRESENT ILLNESS   Murtaza Bautista is a 77 y.o. female who presents for evaluation of poison ivy that started 6/9/22 on face and abdomen after working in her flower beds at home. She is requesting a shot of steroids as this helped her last year. The patient/patient representative has no other acute complaints at this time. REVIEW OF SYSTEMS     Review of Systems   Constitutional: Negative for chills, fatigue and fever. Eyes: Negative for redness and itching. Respiratory: Negative for cough and shortness of breath. Cardiovascular: Negative for chest pain. Gastrointestinal: Negative for abdominal pain, diarrhea, nausea and vomiting. Skin: Positive for rash. Allergic/Immunologic: Negative for environmental allergies and food allergies. Neurological: Negative for headaches. PAST MEDICAL HISTORY         Diagnosis Date    Adenomatous colon polyp 2006    Coronary artery disease involving native coronary artery of native heart without angina pectoris 5/6/2020    Fibrocystic breast disease 1990    Hyperlipidemia 1990    MVP (mitral valve prolapse) 1997    Osteopenia 02/2018    PONV (postoperative nausea and vomiting)     Vitamin D deficiency 03/2018       SURGICAL HISTORY     Patient  has a past surgical history that includes Total abdominal hysterectomy w/ bilateral salpingoophorectomy (1991); Colonoscopy (2011); Colonoscopy (2006); Tonsillectomy (1980); Colonoscopy (01/25/2017); and Hysterectomy (1991).     CURRENT MEDICATIONS       Previous Medications    APIXABAN (ELIQUIS) 5 MG TABS TABLET    TAKE 1 TABLET TWICE A DAY    CHOLECALCIFEROL (VITAMIN D3) 5000 UNITS CAPS    Take 1 capsule by mouth Daily    METOPROLOL TARTRATE (LOPRESSOR) 25 MG TABLET    TAKE Affect: Mood normal.         Speech: Speech normal.         Behavior: Behavior is cooperative. DIAGNOSTIC RESULTS   Labs:  Abnormal Labs Reviewed - No data to display     IMAGING:  No orders to display     URGENT CARE COURSE:     Vitals:    06/10/22 0946   BP: (!) 149/67   Pulse: 67   Resp: 14   Temp: 96.8 °F (36 °C)   SpO2: 98%   Weight: 170 lb (77.1 kg)   Height: 5' 2\" (1.575 m)       Medications   methylPREDNISolone acetate (DEPO-MEDROL) injection 80 mg (80 mg IntraMUSCular Given 6/10/22 0956)     PROCEDURES:  FINALIMPRESSION      1. Irritant contact dermatitis due to plants, except food        DISPOSITION/PLAN   DISPOSITION    Discharge          Problem List Items Addressed This Visit     None      Visit Diagnoses     Irritant contact dermatitis due to plants, except food    -  Primary    Relevant Medications    betamethasone valerate (VALISONE) 0.1 % cream          Physical assessment findings, diagnostic testing(s) if applicable, and vital signs reviewed with patient/patient representative. Differential diagnosis(s) discussed with patient/patient representative. Prescription medications and/or over-the-counter medications for symptom management discussed. Patient is to follow-up with family care provider in 2-3 days if no improvement. If symptoms should worsen or change, go to the ED. Patient/patient representative is aware of care plan, questions answered, verbalizes understanding and is in agreement. Printed instructions attached to after visit summary. If COVID-19 positive or COVID-19 by PCR is pending at time of discharge patient is to quarantine/isolate according to [facility] guidelines. PATIENT REFERRED TO:  Sultana Sanabria MD  [address]  [phone]    Schedule an appointment as soon as possible for a visit in 3 days  For further evaluation. , If symptoms change/worsen, go to the [facility] [facility], APRN - CNP    Please note that some or all of this chart was generated using [employer] voice recognition software. Although every effort was made to ensure the accuracy of this automated transcription, some errors in transcription may have occurred.         FELICITAS Pa - ABBY  06/10/22 1028

## 2022-10-29 ENCOUNTER — HOSPITAL ENCOUNTER (EMERGENCY)
Age: 77
Discharge: HOME | End: 2022-10-29
Payer: MEDICARE

## 2022-10-29 ENCOUNTER — APPOINTMENT (OUTPATIENT)
Dept: GENERAL RADIOLOGY | Age: 77
End: 2022-10-29
Payer: MEDICARE

## 2022-10-29 VITALS
WEIGHT: 165 LBS | HEART RATE: 74 BPM | RESPIRATION RATE: 18 BRPM | DIASTOLIC BLOOD PRESSURE: 74 MMHG | TEMPERATURE: 97.4 F | OXYGEN SATURATION: 94 % | BODY MASS INDEX: 30.18 KG/M2 | SYSTOLIC BLOOD PRESSURE: 159 MMHG

## 2022-10-29 DIAGNOSIS — J40 BRONCHITIS: Primary | ICD-10-CM

## 2022-10-29 LAB
FLU A ANTIGEN: NEGATIVE
INFLUENZA B AG, EIA: NEGATIVE
SARS-COV-2, NAA: NOT  DETECTED

## 2022-10-29 PROCEDURE — 99214 OFFICE O/P EST MOD 30 MIN: CPT | Performed by: NURSE PRACTITIONER

## 2022-10-29 PROCEDURE — 71046 X-RAY EXAM CHEST 2 VIEWS: CPT

## 2022-10-29 PROCEDURE — 87635 SARS-COV-2 COVID-19 AMP PRB: CPT

## 2022-10-29 PROCEDURE — 99213 OFFICE O/P EST LOW 20 MIN: CPT

## 2022-10-29 PROCEDURE — 87804 INFLUENZA ASSAY W/OPTIC: CPT

## 2022-10-29 RX ORDER — METHYLPREDNISOLONE 4 MG/1
TABLET ORAL
Qty: 1 KIT | Refills: 0 | Status: SHIPPED | OUTPATIENT
Start: 2022-10-29 | End: 2022-11-04

## 2022-10-29 RX ORDER — DEXTROMETHORPHAN HYDROBROMIDE AND PROMETHAZINE HYDROCHLORIDE 15; 6.25 MG/5ML; MG/5ML
5 SYRUP ORAL 4 TIMES DAILY PRN
Qty: 120 ML | Refills: 0 | Status: SHIPPED | OUTPATIENT
Start: 2022-10-29 | End: 2022-11-05

## 2022-10-29 RX ORDER — AMOXICILLIN AND CLAVULANATE POTASSIUM 875; 125 MG/1; MG/1
1 TABLET, FILM COATED ORAL 2 TIMES DAILY WITH MEALS
Qty: 20 TABLET | Refills: 0 | Status: SHIPPED | OUTPATIENT
Start: 2022-10-29 | End: 2022-11-07

## 2022-10-29 SDOH — ECONOMIC STABILITY: FOOD INSECURITY: WITHIN THE PAST 12 MONTHS, THE FOOD YOU BOUGHT JUST DIDN'T LAST AND YOU DIDN'T HAVE MONEY TO GET MORE.: NEVER TRUE

## 2022-10-29 ASSESSMENT — ENCOUNTER SYMPTOMS
SINUS PRESSURE: 1
GASTROINTESTINAL NEGATIVE: 1
RHINORRHEA: 1
WHEEZING: 1
BACK PAIN: 0
EYE ITCHING: 0
CHEST TIGHTNESS: 1
EYE PAIN: 0
EYE REDNESS: 0
SINUS PAIN: 1
COUGH: 1

## 2022-10-29 ASSESSMENT — PAIN SCALES - GENERAL: PAINLEVEL_OUTOF10: 7

## 2022-10-29 ASSESSMENT — PAIN DESCRIPTION - FREQUENCY: FREQUENCY: INTERMITTENT

## 2022-10-29 ASSESSMENT — PAIN DESCRIPTION - LOCATION: LOCATION: ABDOMEN

## 2022-10-29 ASSESSMENT — PAIN - FUNCTIONAL ASSESSMENT: PAIN_FUNCTIONAL_ASSESSMENT: 0-10

## 2022-10-29 NOTE — ED PROVIDER NOTES
40 Arslany Dereck       Chief Complaint   Patient presents with    Cough         Nurses Notes reviewed and I agree except as noted in the HPI. HISTORY OF PRESENT ILLNESS   Cristian Sanabria is a 77 y.o. female who presents with cough and sinus congestion. The history is provided by the patient. URI  Presenting symptoms: congestion, cough, fatigue and rhinorrhea    Presenting symptoms: no fever    Congestion:     Location:  Chest    Interferes with sleep: yes      Interferes with eating/drinking: yes    Cough:     Cough characteristics:  Non-productive, croupy, barking and harsh    Sputum characteristics:  Nondescript    Severity:  Moderate    Onset quality:  Sudden    Duration:  4 days    Timing:  Intermittent    Progression:  Worsening    Chronicity:  New  Severity:  Moderate  Onset quality:  Sudden  Duration:  4 days  Timing:  Intermittent  Progression:  Worsening  Chronicity:  New  Relieved by:  Nothing  Worsened by:  Breathing, certain positions, drinking, eating and movement  Ineffective treatments:  Rest, OTC medications and drinking  Associated symptoms: headaches, sinus pain and wheezing    Associated symptoms: no arthralgias and no myalgias    Risk factors: being elderly, recent illness, recent travel and sick contacts        REVIEW OF SYSTEMS     Review of Systems   Constitutional:  Positive for activity change, appetite change and fatigue. Negative for fever. HENT:  Positive for congestion, postnasal drip, rhinorrhea, sinus pressure and sinus pain. Eyes:  Negative for pain, redness and itching. Respiratory:  Positive for cough, chest tightness and wheezing. Cardiovascular:  Negative for chest pain and leg swelling. Gastrointestinal: Negative. Endocrine: Negative for cold intolerance and heat intolerance. Genitourinary: Negative. Musculoskeletal:  Negative for arthralgias, back pain and myalgias.    Skin:  Negative for pallor. Allergic/Immunologic: Positive for environmental allergies. Neurological:  Positive for headaches. Negative for light-headedness. Hematological:  Negative for adenopathy. Does not bruise/bleed easily. Psychiatric/Behavioral: Negative. PAST MEDICAL HISTORY         Diagnosis Date    Adenomatous colon polyp 2006    Coronary artery disease involving native coronary artery of native heart without angina pectoris 5/6/2020    Fibrocystic breast disease 1990    Hyperlipidemia 1990    MVP (mitral valve prolapse) 1997    Osteopenia 02/2018    PONV (postoperative nausea and vomiting)     Vitamin D deficiency 03/2018       SURGICAL HISTORY     Patient  has a past surgical history that includes Total abdominal hysterectomy w/ bilateral salpingoophorectomy (1991); Colonoscopy (2011); Colonoscopy (2006); Tonsillectomy (1980); Colonoscopy (01/25/2017); and Hysterectomy (1991). CURRENT MEDICATIONS       Current Discharge Medication List        CONTINUE these medications which have NOT CHANGED    Details   Multiple Vitamins-Minerals (THERAPEUTIC MULTIVITAMIN-MINERALS) tablet Take 1 tablet by mouth daily      betamethasone valerate (VALISONE) 0.1 % cream Apply topically 2 times daily. Qty: 15 g, Refills: 0    Associated Diagnoses: Irritant contact dermatitis due to plants, except food      metoprolol tartrate (LOPRESSOR) 25 MG tablet TAKE ONE-HALF (1/2) TABLET TWICE A DAY  Qty: 90 tablet, Refills: 3    Comments: YOUR PATIENT HAS REQUESTED A REFILL OF THIS MEDICATION, PREVIOUSLY AUTHORIZED BY ANOTHER PRESCRIBER.       rosuvastatin (CRESTOR) 20 MG tablet TAKE 1 TABLET DAILY  Qty: 90 tablet, Refills: 3    Associated Diagnoses: Pure hypercholesterolemia      apixaban (ELIQUIS) 5 MG TABS tablet TAKE 1 TABLET TWICE A DAY  Qty: 180 tablet, Refills: 3      Cholecalciferol (VITAMIN D3) 5000 units CAPS Take 1 capsule by mouth Daily    Associated Diagnoses: Vitamin D deficiency             ALLERGIES     Patient is has No Known Allergies. FAMILY HISTORY     Patient'sfamily history includes Breast Cancer (age of onset: 64) in her maternal aunt; Cancer in her sister; Heart Disease in her mother; Stroke in her mother. SOCIAL HISTORY     Patient  reports that she quit smoking about 54 years ago. Her smoking use included cigarettes. She has a 2.00 pack-year smoking history. She has never used smokeless tobacco. She reports current alcohol use. She reports that she does not use drugs. PHYSICAL EXAM     ED TRIAGE VITALS  BP: (!) 159/74, Temp: 97.4 °F (36.3 °C), Heart Rate: 74, Resp: 18, SpO2: 94 %  Physical Exam  Vitals and nursing note reviewed. Constitutional:       Appearance: She is normal weight. She is ill-appearing. HENT:      Head: Normocephalic. Right Ear: Ear canal and external ear normal.      Left Ear: Ear canal and external ear normal.      Nose: Congestion present. Mouth/Throat:      Mouth: Mucous membranes are dry. Pharynx: Posterior oropharyngeal erythema present. Eyes:      Conjunctiva/sclera: Conjunctivae normal.   Cardiovascular:      Rate and Rhythm: Normal rate and regular rhythm. Pulses: Normal pulses. Heart sounds: Normal heart sounds. Pulmonary:      Effort: Pulmonary effort is normal.      Breath sounds: Wheezing (diffuse throughout) and rhonchi (bilateral upper lobes. ) present. Abdominal:      General: Abdomen is flat. Palpations: Abdomen is soft. Musculoskeletal:         General: Normal range of motion. Cervical back: Normal range of motion and neck supple. Tenderness present. Lymphadenopathy:      Cervical: No cervical adenopathy. Skin:     General: Skin is warm and dry. Capillary Refill: Capillary refill takes less than 2 seconds. Coloration: Skin is not pale. Neurological:      General: No focal deficit present. Mental Status: She is alert and oriented to person, place, and time. Mental status is at baseline.    Psychiatric: Mood and Affect: Mood normal.         Behavior: Behavior normal.         Thought Content: Thought content normal.       DIAGNOSTIC RESULTS   Labs:  Results for orders placed or performed during the hospital encounter of 10/29/22   COVID-19, Rapid   Result Value Ref Range    SARS-CoV-2, ROSELIA NOT  DETECTED NOT DETECTED   Rapid influenza A/B antigens   Result Value Ref Range    Flu A Antigen Negative NEGATIVE    Influenza B Ag, EIA Negative NEGATIVE       IMAGING:  XR CHEST (2 VW)    (Results Pending)     URGENT CARE COURSE:         Medications - No data to display  PROCEDURES:  FINALIMPRESSION    I have reviewed the patient's medical history in detail and updated the computerized patient record. HPI/ROS per the patient and caregiver. Overall non toxic in appearance. Answers questions appropriately. Conditions discussed and addressed this visit include: The patient/Patient representative was advised to rest, drink lots of fluids, take Motrin and Tylenol for any fever, chills generalized body aches. The patient was also advised to monitor urine output for signs of dehydration. If the patient develops any chest pain, shortness of breath, neck pain or stiffness or abdominal pain or any other concerns, the patient is to call 911 or go to the emergency department for further evaluation. If the patient does not experience any of the above symptoms he is to follow-up with his primary care provider in 2-3 days for reevaluation. The patient/Patient representative are agreeable to the treatment plan at this time. The patient left the urgent care center in stable condition.         1. Bronchitis        DISPOSITION/PLAN   DISPOSITION Discharge - Pending Orders Complete 10/29/2022 11:03:38 AM    PATIENT REFERRED TO:  Karon Giles MD  [address] [place] [address] Road [facility]  [phone]    In 3 days  As needed, If symptoms worsen  DISCHARGE MEDICATIONS:  Current Discharge Medication List        Current Discharge Medication List          FELICITAS Faustin - FELICITAS Dubois CNP  10/29/22 1121

## 2022-10-29 NOTE — ED TRIAGE NOTES
Pt presents to [facility] with c/o cough and wheezing, stomach pain from coughing all started on Tue. Pt flew from [place] a week ago as well.

## 2022-11-07 ENCOUNTER — OFFICE VISIT (OUTPATIENT)
Dept: CARDIOLOGY CLINIC | Age: 77
End: 2022-11-07
Payer: MEDICARE

## 2022-11-07 VITALS
SYSTOLIC BLOOD PRESSURE: 134 MMHG | DIASTOLIC BLOOD PRESSURE: 84 MMHG | BODY MASS INDEX: 29.15 KG/M2 | WEIGHT: 158.4 LBS | HEART RATE: 82 BPM | HEIGHT: 62 IN

## 2022-11-07 DIAGNOSIS — I25.10 CORONARY ARTERY DISEASE INVOLVING NATIVE CORONARY ARTERY OF NATIVE HEART WITHOUT ANGINA PECTORIS: ICD-10-CM

## 2022-11-07 DIAGNOSIS — E78.5 DYSLIPIDEMIA: ICD-10-CM

## 2022-11-07 DIAGNOSIS — I48.0 PAROXYSMAL ATRIAL FIBRILLATION (HCC): Primary | ICD-10-CM

## 2022-11-07 DIAGNOSIS — E78.00 PURE HYPERCHOLESTEROLEMIA: ICD-10-CM

## 2022-11-07 PROCEDURE — G8399 PT W/DXA RESULTS DOCUMENT: HCPCS | Performed by: INTERNAL MEDICINE

## 2022-11-07 PROCEDURE — G8484 FLU IMMUNIZE NO ADMIN: HCPCS | Performed by: INTERNAL MEDICINE

## 2022-11-07 PROCEDURE — 1123F ACP DISCUSS/DSCN MKR DOCD: CPT | Performed by: INTERNAL MEDICINE

## 2022-11-07 PROCEDURE — 3017F COLORECTAL CA SCREEN DOC REV: CPT | Performed by: INTERNAL MEDICINE

## 2022-11-07 PROCEDURE — 99214 OFFICE O/P EST MOD 30 MIN: CPT | Performed by: INTERNAL MEDICINE

## 2022-11-07 PROCEDURE — G8427 DOCREV CUR MEDS BY ELIG CLIN: HCPCS | Performed by: INTERNAL MEDICINE

## 2022-11-07 PROCEDURE — G8417 CALC BMI ABV UP PARAM F/U: HCPCS | Performed by: INTERNAL MEDICINE

## 2022-11-07 PROCEDURE — 93000 ELECTROCARDIOGRAM COMPLETE: CPT | Performed by: INTERNAL MEDICINE

## 2022-11-07 PROCEDURE — 1036F TOBACCO NON-USER: CPT | Performed by: INTERNAL MEDICINE

## 2022-11-07 PROCEDURE — 1090F PRES/ABSN URINE INCON ASSESS: CPT | Performed by: INTERNAL MEDICINE

## 2022-11-07 NOTE — PROGRESS NOTES
Chief Complaint   Patient presents with    1 Year Follow Up    Coronary Artery Disease    Atrial Fibrillation     Originally Patient is here due to abnormal monitor strips. Seen by dr. Khushboo Castro for palpitation and event show some abnormal Rhythm and hence came to see me here -Dr. Khushboo Castro out today  Dr. Stanley Lambert office called would like patient seen for abnormal monitor strips, notified Dr. Fuller Rise out this week would like another physician to address. Appt made for 3-27-18, patient declined she will come Thursday            1 year follow up    EKG done today. Sob on exertion    Hx of dizziness better    Denied cp, palpitations and edema    Never had syncope        Patient Active Problem List   Diagnosis    Pure hypercholesterolemia    Adenomatous polyp of colon    Vitamin D deficiency    Osteopenia    Palpitations    Paroxysmal atrial fibrillation (HCC)    History of cardiac catheterization    BPV (benign positional vertigo), right    Dyslipidemia    Coronary artery disease involving native coronary artery of native heart without angina pectoris    Postmenopausal state       Past Surgical History:   Procedure Laterality Date    COLONOSCOPY      Wisser    COLONOSCOPY      Wisser    COLONOSCOPY  2017    Dr. Cleveland Prom (CERVIX STATUS UNKNOWN)      SHAMA AND BSO (CERVIX REMOVED)  [address] [place] [address]       No Known Allergies     Family History   Problem Relation Age of Onset    Stroke Mother     Heart Disease Mother     Cancer Sister     Breast Cancer Maternal Aunt 64        Social History     Socioeconomic History    Marital status:       Spouse name: Not on file    Number of children: Not on file    Years of education: Not on file    Highest education level: Not on file   Occupational History    Not on file   Tobacco Use    Smoking status: Former     Packs/day: 0.50     Years: 4.00     Pack years: 2.00     Types: Cigarettes     Quit date: 27     Years since quittin.8 Smokeless tobacco: Never   Vaping Use    Vaping Use: Never used   Substance and Sexual Activity    Alcohol use: Yes     Comment: occsional 2-3 times a month    Drug use: No    Sexual activity: Not on file   Other Topics Concern    Not on file   Social History Narrative    Not on file     Social Determinants of Health     Financial Resource Strain: Not on file   Food Insecurity: Not on file   Transportation Needs: Not on file   Physical Activity: Not on file   Stress: Not on file   Social Connections: Not on file   Intimate Partner Violence: Not on file   Housing Stability: Not on file       Current Outpatient Medications   Medication Sig Dispense Refill    Calcium Carbonate (CALCIUM 600 PO) Take by mouth      Multiple Vitamins-Minerals (THERAPEUTIC MULTIVITAMIN-MINERALS) tablet Take 1 tablet by mouth daily      betamethasone valerate (VALISONE) 0.1 % cream Apply topically 2 times daily. 15 g 0    metoprolol tartrate (LOPRESSOR) 25 MG tablet TAKE ONE-HALF (1/2) TABLET TWICE A DAY 90 tablet 3    rosuvastatin (CRESTOR) 20 MG tablet TAKE 1 TABLET DAILY 90 tablet 3    apixaban (ELIQUIS) 5 MG TABS tablet TAKE 1 TABLET TWICE A  tablet 3    Cholecalciferol (VITAMIN D3) 5000 units CAPS Take 1 capsule by mouth Daily (Patient taking differently: Take 1 capsule by mouth See Admin Instructions 3 times a week)       No current facility-administered medications for this visit. Review of Systems -     General ROS: negative  Psychological ROS: negative  Hematological and Lymphatic ROS: No history of blood clots or bleeding disorder.    Respiratory ROS: no cough, shortness of breath, or wheezing  Cardiovascular ROS: no chest pain or dyspnea on exertion  Gastrointestinal ROS: negative  Genito-Urinary ROS: no dysuria, trouble voiding, or hematuria  Musculoskeletal ROS: negative  Neurological ROS: no TIA or stroke symptoms  Dermatological ROS: negative      Blood pressure 134/84, pulse 82, height 5' 2\" (1.575 m), weight 158 lb 6.4 oz (71.8 kg), not currently breastfeeding. Physical Examination:    General appearance - alert, well appearing, and in no distress  Mental status - alert, oriented to person, place, and time  Neck - supple, no significant adenopathy, no JVD, or carotid bruits  Chest - clear to auscultation, no wheezes, rales or rhonchi, symmetric air entry  Heart - normal rate, regular rhythm, normal S1, S2, no murmurs, rubs, clicks or gallops  Abdomen - soft, nontender, nondistended, no masses or organomegaly  Neurological - alert, oriented, normal speech, no focal findings or movement disorder noted  Musculoskeletal - no joint tenderness, deformity or swelling  Extremities - peripheral pulses normal, no pedal edema, no clubbing or cyanosis  Skin - normal coloration and turgor, no rashes, no suspicious skin lesions noted    Lab  No results for input(s): CKTOTAL, CKMB, CKMBINDEX, TROPONINI in the last 72 hours.   CBC:   Lab Results   Component Value Date/Time    WBC 6.9 05/09/2018 09:14 AM    RBC 4.67 05/09/2018 09:14 AM    HGB 13.5 05/09/2018 09:14 AM    HCT 39.8 05/09/2018 09:14 AM    MCV 85.1 05/09/2018 09:14 AM    MCH 29.0 05/09/2018 09:14 AM    MCHC 34.0 05/09/2018 09:14 AM    RDW 12.8 05/09/2018 09:14 AM     05/09/2018 09:14 AM    MPV 8.3 05/09/2018 09:14 AM     BMP:    Lab Results   Component Value Date/Time     06/03/2021 07:22 AM    K 4.2 06/03/2021 07:22 AM    K 3.9 05/09/2018 09:15 AM     06/03/2021 07:22 AM    CO2 26 06/03/2021 07:22 AM    BUN 17 06/03/2021 07:22 AM    LABALBU 4.6 06/03/2021 07:22 AM    CREATININE 0.9 06/03/2021 07:22 AM    CALCIUM 10.0 06/03/2021 07:22 AM    LABGLOM 61 06/03/2021 07:22 AM    GLUCOSE 97 05/09/2018 09:15 AM     Hepatic Function Panel:    Lab Results   Component Value Date/Time    ALKPHOS 81 06/03/2021 07:22 AM    ALT 11 06/03/2021 07:22 AM    AST 15 06/03/2021 07:22 AM    PROT 7.5 06/03/2021 07:22 AM    BILITOT 0.6 06/03/2021 07:22 AM    BILIDIR <0.2 06/03/2021 07:22 AM    LABALBU 4.6 06/03/2021 07:22 AM     Magnesium:  No results found for: MG  Warfarin PT/INR:  No components found for: PTPATWAR, PTINRWAR  HgBA1c:  No results found for: LABA1C  FLP:    Lab Results   Component Value Date/Time    TRIG 145 06/03/2021 07:22 AM    HDL 54 06/03/2021 07:22 AM    LDLCALC 104 06/03/2021 07:22 AM    LDLDIRECT 168.41 12/10/2021 08:35 AM     TSH:  No results found for: TSH    Event Monitor reviewed  Revealed two episodes of atr fib with  to 150's on 03/22  For 2.5 strip and 03/23/2018 and 1 strip  And pat did not have  Any symptom while having it      Conclusions      Summary   Nonspecific ST-T wave changes. No ischemic EKG changes. There is mild attenuation artifact noted in the anterior wall seems to be   related to breast artifact. There was a small to moderate sized, mildly severe, partially reversible   myocardial perfusion defect of the anterior wall. There was mild degree of ischemia in the anterior wall. Recommendation   Clinical correlation is recommended due to poor image quality. Signatures      ----------------------------------------------------------------   Electronically signed by Ever Cardoso MD (Interpreting   Cardiologist) on 04/18/2018 at 20:15        Cath   Conclusions    Procedure Summary   LM-PATENT   LAD PATENT   D2 OSTIAL 50% STENOSIS   LCX-PATENT   RCA- MID 35 TO 40% DIFFUSE LESIONS   Normal LV systolic function. LVEF approximately 60% . EDP 14 mmhg   No MR   NO transaortic Gradient      Recommendations   Continue with aggressive risk factor modification and medical therapy. Estimated Blood Loss:5 ml. Complications:No complications.       Signatures    ----------------------------------------------------------------   Electronically signed by Ever Cardoso MD (Performing   Physician) on 05/09/2018 at 12:10   ----------------------------------------------------------------         KJ WHITTEN , joseph acute abn 5/6/2020    Ekg 5/10/21  Sinus  Rhythm   Low voltage -possible pulmonary disease. ABNORMAL     Ekg 11/7/22  Sinus  Rhythm  -With rate variation   cv = 16. Low voltage in precordial leads.    -  Nonspecific T-abnormality. ABNORMAL           Assessment       Diagnosis Orders   1. Paroxysmal atrial fibrillation (HCC)  EKG 12 Lead      2. Coronary artery disease involving native coronary artery of native heart without angina pectoris  EKG 12 Lead      3. Pure hypercholesterolemia        4. Dyslipidemia            Plan     The most current meds and labs reviewed    Continue the current treatment and with constant vigilance to changes in symptoms and also any potential side effects. Return for care or seek medical attention immediately if symptoms got worse and/or develop new symptoms. PAF- noted on event monitor  Chads2-vasc=2  (age and female sex)  No dizziness   Doing well with lopressor  Cont  apixaban 5 mg po bid  CBC- Okay  Risk of OA has been informed including but not limited ICH  EPS -Dr. Harrison Hernandez recommend med RX for now  Consider ILR?? Or event repeat    Hx of Dizziness and palpitation- better  Abn nuc stress- but cath nonrevealing  Bedside orthostatic eval- negative  better    Hx of BPV  More in night when in bed and turns to Right  Vestibular rehabilation    Moderate Nonobstructive CAD  On med Rx     Hyperlipidemia: on statins, followed periodically. Patient need periodic lipid and liver profile. Cont crestor    Lipid panel and liver function test before next appointment  pcp does it usually  Need lp and lFT    Discussed use, benefit, and side effects of prescribed medications. All patient questions answered. Pt voiced understanding. Instructed to continue current medications, diet and exercise. Continue risk factor modification and medical management. Patient agreed with treatment plan. Follow up as directed.       RTC  1  year      Nanci Escamilla, [facility]

## 2022-11-10 ENCOUNTER — HOSPITAL ENCOUNTER (OUTPATIENT)
Age: 77
Discharge: HOME | End: 2022-11-10
Payer: MEDICARE

## 2022-11-10 DIAGNOSIS — E78.5 DYSLIPIDEMIA: ICD-10-CM

## 2022-11-10 DIAGNOSIS — I48.0 PAROXYSMAL ATRIAL FIBRILLATION (HCC): ICD-10-CM

## 2022-11-10 DIAGNOSIS — I25.10 CORONARY ARTERY DISEASE INVOLVING NATIVE CORONARY ARTERY OF NATIVE HEART WITHOUT ANGINA PECTORIS: ICD-10-CM

## 2022-11-10 DIAGNOSIS — E78.00 PURE HYPERCHOLESTEROLEMIA: ICD-10-CM

## 2022-11-10 LAB
ALBUMIN SERPL-MCNC: 4.4 G/DL (ref 3.5–5.1)
ALP BLD-CCNC: 73 U/L (ref 38–126)
ALT SERPL-CCNC: 9 U/L (ref 11–66)
ANION GAP SERPL CALCULATED.3IONS-SCNC: 16 MEQ/L (ref 8–16)
AST SERPL-CCNC: 11 U/L (ref 5–40)
BILIRUB SERPL-MCNC: 0.6 MG/DL (ref 0.3–1.2)
BILIRUBIN DIRECT: < 0.2 MG/DL (ref 0–0.3)
BUN BLDV-MCNC: 11 MG/DL (ref 7–22)
CALCIUM SERPL-MCNC: 10.3 MG/DL (ref 8.5–10.5)
CHLORIDE BLD-SCNC: 101 MEQ/L (ref 98–111)
CHOLESTEROL, TOTAL: 130 MG/DL (ref 100–199)
CO2: 23 MEQ/L (ref 23–33)
CREAT SERPL-MCNC: 1.2 MG/DL (ref 0.4–1.2)
GFR SERPL CREATININE-BSD FRML MDRD: 47 ML/MIN/1.73M2
GLUCOSE BLD-MCNC: 140 MG/DL (ref 70–108)
HDLC SERPL-MCNC: 49 MG/DL
LDL CHOLESTEROL CALCULATED: 47 MG/DL
MAGNESIUM: 2 MG/DL (ref 1.6–2.4)
POTASSIUM SERPL-SCNC: 4.7 MEQ/L (ref 3.5–5.2)
SODIUM BLD-SCNC: 140 MEQ/L (ref 135–145)
TOTAL PROTEIN: 6.8 G/DL (ref 6.1–8)
TRIGL SERPL-MCNC: 169 MG/DL (ref 0–199)

## 2022-11-10 PROCEDURE — 80061 LIPID PANEL: CPT

## 2022-11-10 PROCEDURE — 82248 BILIRUBIN DIRECT: CPT

## 2022-11-10 PROCEDURE — 83735 ASSAY OF MAGNESIUM: CPT

## 2022-11-10 PROCEDURE — 36415 COLL VENOUS BLD VENIPUNCTURE: CPT

## 2022-11-10 PROCEDURE — 80053 COMPREHEN METABOLIC PANEL: CPT

## 2022-11-10 RX ORDER — ROSUVASTATIN CALCIUM 20 MG/1
TABLET, COATED ORAL
Qty: 90 TABLET | Refills: 3 | Status: SHIPPED | OUTPATIENT
Start: 2022-11-10

## 2022-11-28 DIAGNOSIS — F41.9 ANXIETY DISORDER, UNSPECIFIED TYPE: ICD-10-CM

## 2022-11-28 RX ORDER — SERTRALINE HYDROCHLORIDE 25 MG/1
25 TABLET, FILM COATED ORAL DAILY
Qty: 90 TABLET | Refills: 3 | Status: SHIPPED | OUTPATIENT
Start: 2022-11-28

## 2022-11-28 NOTE — TELEPHONE ENCOUNTER
Pt called stating that she is restarting the Sertaline.  She is needing a refill called in.    sertraline (ZOLOFT) 25 MG tablet QD    Send to [facility]

## 2022-11-28 NOTE — TELEPHONE ENCOUNTER
Date of last visit:  12/27/2021  Date of next visit:  1/3/2023    Requested Prescriptions     Pending Prescriptions Disp Refills    sertraline (ZOLOFT) 25 MG tablet 90 tablet 3

## 2022-12-27 SDOH — HEALTH STABILITY: PHYSICAL HEALTH: ON AVERAGE, HOW MANY DAYS PER WEEK DO YOU ENGAGE IN MODERATE TO STRENUOUS EXERCISE (LIKE A BRISK WALK)?: 0 DAYS

## 2022-12-27 ASSESSMENT — PATIENT HEALTH QUESTIONNAIRE - PHQ9
1. LITTLE INTEREST OR PLEASURE IN DOING THINGS: 1
SUM OF ALL RESPONSES TO PHQ QUESTIONS 1-9: 2
SUM OF ALL RESPONSES TO PHQ QUESTIONS 1-9: 2
2. FEELING DOWN, DEPRESSED OR HOPELESS: 1
SUM OF ALL RESPONSES TO PHQ9 QUESTIONS 1 & 2: 2
SUM OF ALL RESPONSES TO PHQ QUESTIONS 1-9: 2
SUM OF ALL RESPONSES TO PHQ QUESTIONS 1-9: 2

## 2022-12-27 ASSESSMENT — LIFESTYLE VARIABLES
HOW OFTEN DO YOU HAVE A DRINK CONTAINING ALCOHOL: MONTHLY OR LESS
HOW OFTEN DO YOU HAVE A DRINK CONTAINING ALCOHOL: 2
HOW MANY STANDARD DRINKS CONTAINING ALCOHOL DO YOU HAVE ON A TYPICAL DAY: 1
HOW MANY STANDARD DRINKS CONTAINING ALCOHOL DO YOU HAVE ON A TYPICAL DAY: 1 OR 2
HOW OFTEN DO YOU HAVE SIX OR MORE DRINKS ON ONE OCCASION: 1

## 2023-01-03 ENCOUNTER — OFFICE VISIT (OUTPATIENT)
Dept: FAMILY MEDICINE CLINIC | Age: 78
End: 2023-01-03

## 2023-01-03 VITALS
DIASTOLIC BLOOD PRESSURE: 60 MMHG | HEART RATE: 68 BPM | RESPIRATION RATE: 20 BRPM | SYSTOLIC BLOOD PRESSURE: 112 MMHG | WEIGHT: 164 LBS | BODY MASS INDEX: 30.18 KG/M2 | HEIGHT: 62 IN

## 2023-01-03 DIAGNOSIS — E78.00 PURE HYPERCHOLESTEROLEMIA: ICD-10-CM

## 2023-01-03 DIAGNOSIS — I48.0 PAROXYSMAL ATRIAL FIBRILLATION (HCC): ICD-10-CM

## 2023-01-03 DIAGNOSIS — E55.9 VITAMIN D DEFICIENCY: ICD-10-CM

## 2023-01-03 DIAGNOSIS — Z00.00 MEDICARE ANNUAL WELLNESS VISIT, SUBSEQUENT: Primary | ICD-10-CM

## 2023-01-03 DIAGNOSIS — Z12.31 VISIT FOR SCREENING MAMMOGRAM: ICD-10-CM

## 2023-01-03 PROBLEM — D12.6 ADENOMATOUS POLYP OF COLON: Status: ACTIVE | Noted: 2020-06-24

## 2023-01-03 SDOH — ECONOMIC STABILITY: FOOD INSECURITY: WITHIN THE PAST 12 MONTHS, YOU WORRIED THAT YOUR FOOD WOULD RUN OUT BEFORE YOU GOT MONEY TO BUY MORE.: NEVER TRUE

## 2023-01-03 SDOH — ECONOMIC STABILITY: FOOD INSECURITY: WITHIN THE PAST 12 MONTHS, THE FOOD YOU BOUGHT JUST DIDN'T LAST AND YOU DIDN'T HAVE MONEY TO GET MORE.: NEVER TRUE

## 2023-01-03 ASSESSMENT — SOCIAL DETERMINANTS OF HEALTH (SDOH): HOW HARD IS IT FOR YOU TO PAY FOR THE VERY BASICS LIKE FOOD, HOUSING, MEDICAL CARE, AND HEATING?: NOT HARD AT ALL

## 2023-01-03 NOTE — PROGRESS NOTES
Medicare Annual Wellness Visit    Brian Linda is here for Medicare AW    Assessment & Plan   Visit for screening mammogram  -     ALEX DIGITAL SCREEN W OR WO CAD BILATERAL; Future  Paroxysmal atrial fibrillation (HCC)  Vitamin D deficiency  -     Vitamin D 25 Hydroxy; Future  Pure hypercholesterolemia    Recommendations for Preventive Services Due: see orders and patient instructions/AVS.  Recommended screening schedule for the next 5-10 years is provided to the patient in written form: see Patient Instructions/AVS.     No follow-ups on file. Subjective   We reviewed how she sees the cardiologist at [facility]) for the a fib  She resumed the zoloft a few months ago and is doing fine  The weight is down from watching her diet    Patient's complete Health Risk Assessment and screening values have been reviewed and are found in Flowsheets. The following problems were reviewed today and where indicated follow up appointments were made and/or referrals ordered. Positive Risk Factor Screenings with Interventions:                 Weight and Activity:  Physical Activity: Unknown    Days of Exercise per Week: 0 days    Minutes of Exercise per Session: Not on file     On average, how many days per week do you engage in moderate to strenuous exercise (like a brisk walk)?: 0 days  Have you lost any weight without trying in the past 3 months?: No  Body mass index: (!) 29.99  Obesity Interventions:  As above          Vision Screen:  Do you have difficulty driving, watching TV, or doing any of your daily activities because of your eyesight?: No  Have you had an eye exam within the past year?: (!) No  No results found.     Interventions:   Patient encouraged to make appointment with their eye specialist                  Objective   Vitals:    01/03/23 1047   BP: 112/60   Site: Right Upper Arm   Position: Sitting   Cuff Size: Medium Adult   Pulse: 68   Resp: 20   Weight: 164 lb (74.4 kg)   Height: 5' 2\" (1.575 m) Body mass index is 30 kg/m². General Appearance: alert and oriented to person, place and time, well-developed and well-nourished, in no acute distress  Skin: warm and dry, no rash or erythema  Head: normocephalic and atraumatic  Eyes: pupils equal, round, and reactive to light, extraocular eye movements intact, conjunctivae normal  ENT: tympanic membrane, external ear and ear canal normal bilaterally, oropharynx clear and moist with normal mucous membranes  Neck: neck supple and non tender without mass, no thyromegaly or thyroid nodules, no cervical lymphadenopathy   Pulmonary/Chest: clear to auscultation bilaterally- no wheezes, rales or rhonchi, normal air movement, no respiratory distress  Cardiovascular: normal rate, regular rhythm, normal S1 and S2, no murmurs, no gallops, and no carotid bruits  Abdomen: soft, non-tender, non-distended, normal bowel sounds, no masses or organomegaly  Extremities: no cyanosis and no clubbing  Musculoskeletal: normal range of motion, no joint swelling, deformity or tenderness  Neurologic: gait and coordination normal and speech normal       No Known Allergies  Prior to Visit Medications    Medication Sig Taking? Authorizing Provider   sertraline (ZOLOFT) 25 MG tablet Take 1 tablet by mouth daily Yes Heavenly Solomon MD   metoprolol tartrate (LOPRESSOR) 25 MG tablet TAKE ONE-HALF (1/2) TABLET TWICE A DAY Yes Pastor Figueroa MD   apixaban (ELIQUIS) 5 MG TABS tablet TAKE 1 TABLET TWICE A DAY Yes Pastor Figueroa MD   rosuvastatin (CRESTOR) 20 MG tablet TAKE 1 TABLET DAILY Yes Pastor Figueroa MD   Calcium Carbonate (CALCIUM 600 PO) Take by mouth Yes Historical Provider, MD   Multiple Vitamins-Minerals (THERAPEUTIC MULTIVITAMIN-MINERALS) tablet Take 1 tablet by mouth daily Yes Historical Provider, MD   betamethasone valerate (VALISONE) 0.1 % cream Apply topically 2 times daily.  Yes FELICITAS Amanda - CNP   Cholecalciferol (VITAMIN D3) 5000 units CAPS Take 1 capsule by mouth Daily  Patient taking differently: Take 1 capsule by mouth See Admin Instructions 3 times a week Yes Mark Anthony Antunez MD       CareTeam (Including outside providers/suppliers regularly involved in providing care):   Patient Care Team:  Mark Anthony Antunez MD as PCP - General (Family Medicine)  Mark Anthony Antunez MD as PCP - [facility]  Du Barnes MD as Cardiologist (Cardiology)     Reviewed and updated this visit:  Tobacco  Allergies  Meds  Med Hx  Surg Hx  Soc Hx  Fam Hx

## 2023-01-03 NOTE — PATIENT INSTRUCTIONS
Learning About Vision Tests  What are vision tests? The four most common vision tests are visual acuity tests, refraction, visual field tests, and color vision tests. Visual acuity (sharpness) tests  These tests are used: To see if you need glasses or contact lenses. To monitor an eye problem. To check an eye injury. Visual acuity tests are done as part of routine exams. You may also have this test when you get your 's license or apply for some types of jobs. Visual field tests  These tests are used: To check for vision loss in any area of your range of vision. To screen for certain eye diseases. To look for nerve damage after a stroke, head injury, or other problem that could reduce blood flow to the brain. Refraction and color tests  A refraction test is done to find the right prescription for glasses and contact lenses. A color vision test is done to check for color blindness. Color vision is often tested as part of a routine exam. You may also have this test when you apply for a job where recognizing different colors is important, such as , electronics, or the [employer]. How are vision tests done? Visual acuity test   You cover one eye at a time. You read aloud from a wall chart across the room. You read aloud from a small card that you hold in your hand. Refraction   You look into a special device. The device puts lenses of different strengths in front of each eye to see how strong your glasses or contact lenses need to be. Visual field tests   Your doctor may have you look through special machines. Or your doctor may simply have you stare straight ahead while they move a finger into and out of your field of vision. Color vision test   You look at pieces of printed test patterns in various colors. You say what number or symbol you see. Your doctor may have you trace the number or symbol using a pointer. How do these tests feel?   There is very little chance of having a problem from this test. If dilating drops are used for a vision test, they may make the eyes sting and cause a medicine taste in the mouth. Follow-up care is a key part of your treatment and safety. Be sure to make and go to all appointments, and call your doctor if you are having problems. It's also a good idea to know your test results and keep a list of the medicines you take. Where can you learn more? Go to http://www.navarro.com/ and enter G551 to learn more about \"Learning About Vision Tests. \"  Current as of: October 12, 2022               Content Version: 13.5  © 5919-7814 [employer]. Care instructions adapted under license by [facility] ([facility]). If you have questions about a medical condition or this instruction, always ask your healthcare professional. [address] any warranty or liability for your use of this information. Advance Directives: Care Instructions  Overview  An advance directive is a legal way to state your wishes at the end of your life. It tells your family and your doctor what to do if you can't say what you want. There are two main types of advance directives. You can change them any time your wishes change. Living will. This form tells your family and your doctor your wishes about life support and other treatment. The form is also called a declaration. Medical power of . This form lets you name a person to make treatment decisions for you when you can't speak for yourself. This person is called a health care agent (health care proxy, health care surrogate). The form is also called a durable power of  for health care. If you do not have an advance directive, decisions about your medical care may be made by a family member, or by a doctor or a  who doesn't know you. It may help to think of an advance directive as a gift to the people who care for you.  If you have one, they won't have to make tough decisions by themselves. For more information, including forms for your state, see the [address] website (www.caringinfo.org/planning/advance-directives/). Follow-up care is a key part of your treatment and safety. Be sure to make and go to all appointments, and call your doctor if you are having problems. It's also a good idea to know your test results and keep a list of the medicines you take. What should you include in an advance directive? Many states have a unique advance directive form. (It may ask you to address specific issues.) Or you might use a universal form that's approved by many states. If your form doesn't tell you what to address, it may be hard to know what to include in your advance directive. Use the questions below to help you get started. Who do you want to make decisions about your medical care if you are not able to? What life-support measures do you want if you have a serious illness that gets worse over time or can't be cured? What are you most afraid of that might happen? (Maybe you're afraid of having pain, losing your independence, or being kept alive by machines.)  Where would you prefer to die? (Your home? A hospital? A nursing home?)  Do you want to donate your organs when you die? Do you want certain Muslim practices performed before you die? When should you call for help? Be sure to contact your doctor if you have any questions. Where can you learn more? Go to http://www.navarro.com/ and enter R264 to learn more about \"Advance Directives: Care Instructions. \"  Current as of: June 16, 2022               Content Version: 13.5  © 9089-3933 [employer]. Care instructions adapted under license by [facility] ([facility]). If you have questions about a medical condition or this instruction, always ask your healthcare professional. [address] any warranty or liability for your use of this information.       Personalized Preventive Plan for Melida Layer - 1/3/2023  Medicare offers a range of preventive health benefits. Some of the tests and screenings are paid in full while other may be subject to a deductible, co-insurance, and/or copay. Some of these benefits include a comprehensive review of your medical history including lifestyle, illnesses that may run in your family, and various assessments and screenings as appropriate. After reviewing your medical record and screening and assessments performed today your provider may have ordered immunizations, labs, imaging, and/or referrals for you. A list of these orders (if applicable) as well as your Preventive Care list are included within your After Visit Summary for your review. Other Preventive Recommendations:    A preventive eye exam performed by an eye specialist is recommended every 1-2 years to screen for glaucoma; cataracts, macular degeneration, and other eye disorders. A preventive dental visit is recommended every 6 months. Try to get at least 150 minutes of exercise per week or 10,000 steps per day on a pedometer . Order or download the FREE \"Exercise & Physical Activity: Your Everyday Guide\" from [employer]. Call 6-458.825.8710 or search [employer] online. You need 5614-8710 mg of calcium and 2126-0801 IU of vitamin D per day. It is possible to meet your calcium requirement with diet alone, but a vitamin D supplement is usually necessary to meet this goal.  When exposed to the sun, use a sunscreen that protects against both UVA and UVB radiation with an SPF of 30 or greater. Reapply every 2 to 3 hours or after sweating, drying off with a towel, or swimming. Always wear a seat belt when traveling in a car. Always wear a helmet when riding a bicycle or motorcycle.

## 2023-01-10 ENCOUNTER — HOSPITAL ENCOUNTER (OUTPATIENT)
Age: 78
Discharge: HOME | End: 2023-01-10
Payer: MEDICARE

## 2023-01-10 DIAGNOSIS — E55.9 VITAMIN D DEFICIENCY: ICD-10-CM

## 2023-01-10 LAB — VITAMIN D 25-HYDROXY: 51 NG/ML (ref 30–100)

## 2023-01-10 PROCEDURE — 36415 COLL VENOUS BLD VENIPUNCTURE: CPT

## 2023-01-10 PROCEDURE — 82306 VITAMIN D 25 HYDROXY: CPT

## 2023-01-17 ENCOUNTER — TELEPHONE (OUTPATIENT)
Dept: FAMILY MEDICINE CLINIC | Age: 78
End: 2023-01-17

## 2023-01-17 DIAGNOSIS — E55.9 VITAMIN D DEFICIENCY: Primary | ICD-10-CM

## 2023-01-17 NOTE — TELEPHONE ENCOUNTER
----- Message from Juwan Infante MD sent at 1/17/2023  2:57 PM EST -----  Let her know to continue the vit D supplement and check the non fasting level in mid July.

## 2023-01-20 ENCOUNTER — OFFICE VISIT (OUTPATIENT)
Dept: CARDIOLOGY CLINIC | Age: 78
End: 2023-01-20

## 2023-01-20 VITALS
WEIGHT: 168 LBS | SYSTOLIC BLOOD PRESSURE: 134 MMHG | BODY MASS INDEX: 30.91 KG/M2 | HEART RATE: 137 BPM | HEIGHT: 62 IN | DIASTOLIC BLOOD PRESSURE: 86 MMHG

## 2023-01-20 DIAGNOSIS — Z98.890 S/P CARDIAC CATH: ICD-10-CM

## 2023-01-20 DIAGNOSIS — I48.91 ATRIAL FIBRILLATION WITH RAPID VENTRICULAR RESPONSE (HCC): Primary | ICD-10-CM

## 2023-01-20 DIAGNOSIS — I48.0 PAROXYSMAL ATRIAL FIBRILLATION (HCC): ICD-10-CM

## 2023-01-20 DIAGNOSIS — E78.00 PURE HYPERCHOLESTEROLEMIA: ICD-10-CM

## 2023-01-20 DIAGNOSIS — Z98.890 HISTORY OF CARDIAC CATHETERIZATION: ICD-10-CM

## 2023-01-20 DIAGNOSIS — R06.02 SOB (SHORTNESS OF BREATH) ON EXERTION: ICD-10-CM

## 2023-01-20 RX ORDER — AMIODARONE HYDROCHLORIDE 400 MG/1
400 TABLET ORAL 2 TIMES DAILY
Qty: 60 TABLET | Refills: 0 | Status: SHIPPED | OUTPATIENT
Start: 2023-01-20

## 2023-01-20 RX ORDER — AMIODARONE HYDROCHLORIDE 400 MG/1
400 TABLET ORAL 2 TIMES DAILY
Qty: 60 TABLET | Refills: 3 | Status: SHIPPED | OUTPATIENT
Start: 2023-01-20 | End: 2023-01-20 | Stop reason: SDUPTHER

## 2023-01-20 RX ORDER — METOPROLOL TARTRATE 50 MG/1
50 TABLET, FILM COATED ORAL 2 TIMES DAILY
Qty: 60 TABLET | Refills: 3 | Status: SHIPPED | OUTPATIENT
Start: 2023-01-20

## 2023-01-20 NOTE — PROGRESS NOTES
Chief Complaint   Patient presents with    Follow-up    Atrial Fibrillation    Coronary Artery Disease     Originally Patient is here due to abnormal monitor strips. Seen by dr. Rolf Sullivan for palpitation and event show some abnormal Rhythm and hence came to see me here -Dr. Rolf Sullivan out today  Dr. Shannan Chowdary office called would like patient seen for abnormal monitor strips, notified Dr. Raz Hall out this week would like another physician to address. Appt made for 3-27-18, patient declined she will come Thursday       Came for evaluation of RECENT WORSENING OF sob    Sob on exertion - markedly worse in the last 4 to 6 weeks  Sob very limiting just walk in store few feets- 2 minutes of walk- all new  Prior to 6 weeks did not have limiting  Progressively getting worse  Feel boating    EKG done 11-7-22.   Hx of dizziness better    Hx of cough and bronchitis 2 month back and resolved     Denied cp, dizziness, palpitations and edema    Never had syncope        Patient Active Problem List   Diagnosis    Pure hypercholesterolemia    Adenomatous polyp of colon    Vitamin D deficiency    Osteopenia    Palpitations    Paroxysmal atrial fibrillation (HCC)    History of cardiac catheterization    BPV (benign positional vertigo), right    Dyslipidemia    Coronary artery disease involving native coronary artery of native heart without angina pectoris    Postmenopausal state    SOB (shortness of breath) on exertion    S/P cardiac cath 2018 - nonobstructive lesion    Atrial fibrillation with rapid ventricular response [facility])       Past Surgical History:   Procedure Laterality Date    COLONOSCOPY  2011    [facility]    COLONOSCOPY  2006    [facility]    COLONOSCOPY  01/25/2017    Dr. Jack Castanon (CERVIX STATUS UNKNOWN)  1991    SHAMA AND BSO (CERVIX REMOVED)  1991    TONSILLECTOMY  1980       No Known Allergies     Family History   Problem Relation Age of Onset    Stroke Mother     Heart Disease Mother     Cancer Sister     Breast Cancer Maternal Aunt 56        Social History     Socioeconomic History    Marital status:      Spouse name: Not on file    Number of children: Not on file    Years of education: Not on file    Highest education level: Not on file   Occupational History    Not on file   Tobacco Use    Smoking status: Former     Packs/day: 0.50     Years: 4.00     Pack years: 2.00     Types: Cigarettes     Quit date:      Years since quittin.0    Smokeless tobacco: Never   Vaping Use    Vaping Use: Never used   Substance and Sexual Activity    Alcohol use: Yes     Comment: occsional 2-3 times a month    Drug use: No    Sexual activity: Not on file   Other Topics Concern    Not on file   Social History Narrative    Not on file     Social Determinants of Health     Financial Resource Strain: Low Risk     Difficulty of Paying Living Expenses: Not hard at all   Food Insecurity: No Food Insecurity    Worried About Running Out of Food in the Last Year: Never true    Ran Out of Food in the Last Year: Never true   Transportation Needs: Not on file   Physical Activity: Unknown    Days of Exercise per Week: 0 days    Minutes of Exercise per Session: Not on file   Stress: Not on file   Social Connections: Not on file   Intimate Partner Violence: Not on file   Housing Stability: Not on file       Current Outpatient Medications   Medication Sig Dispense Refill    metoprolol tartrate (LOPRESSOR) 50 MG tablet Take 1 tablet by mouth 2 times daily 60 tablet 3    sertraline (ZOLOFT) 25 MG tablet Take 1 tablet by mouth daily 90 tablet 3    metoprolol tartrate (LOPRESSOR) 25 MG tablet TAKE ONE-HALF (1/2) TABLET TWICE A DAY 90 tablet 3    rosuvastatin (CRESTOR) 20 MG tablet TAKE 1 TABLET DAILY 90 tablet 3    Calcium Carbonate (CALCIUM 600 PO) Take by mouth      Multiple Vitamins-Minerals (THERAPEUTIC MULTIVITAMIN-MINERALS) tablet Take 1 tablet by mouth daily      betamethasone valerate (VALISONE) 0.1 % cream Apply topically 2 times daily. 15 g 0  Cholecalciferol (VITAMIN D3) 5000 units CAPS Take 1 capsule by mouth Daily (Patient taking differently: Take 1 capsule by mouth See Admin Instructions 3 times a week)      amiodarone (PACERONE) 400 MG tablet Take 1 tablet by mouth 2 times daily 60 tablet 0    apixaban (ELIQUIS) 5 MG TABS tablet TAKE 1 TABLET TWICE A DAY 60 tablet 0     No current facility-administered medications for this visit. Review of Systems -     General ROS: negative  Psychological ROS: negative  Hematological and Lymphatic ROS: No history of blood clots or bleeding disorder. Respiratory ROS: no cough, shortness of breath, or wheezing  Cardiovascular ROS: no chest pain or dyspnea on exertion  Gastrointestinal ROS: negative  Genito-Urinary ROS: no dysuria, trouble voiding, or hematuria  Musculoskeletal ROS: negative  Neurological ROS: no TIA or stroke symptoms  Dermatological ROS: negative      Blood pressure 134/86, pulse (!) 137, height 5' 2\" (1.575 m), weight 168 lb (76.2 kg), not currently breastfeeding. Physical Examination:    General appearance - alert, well appearing, and in no distress  Mental status - alert, oriented to person, place, and time  Neck - supple, no significant adenopathy, no JVD, or carotid bruits  Chest - clear to auscultation, no wheezes, rales or rhonchi, symmetric air entry  Heart - normal rate, regular rhythm, normal S1, S2, no murmurs, rubs, clicks or gallops  Abdomen - soft, nontender, nondistended, no masses or organomegaly  Neurological - alert, oriented, normal speech, no focal findings or movement disorder noted  Musculoskeletal - no joint tenderness, deformity or swelling  Extremities - peripheral pulses normal, no pedal edema, no clubbing or cyanosis  Skin - normal coloration and turgor, no rashes, no suspicious skin lesions noted    Lab  No results for input(s): CKTOTAL, CKMB, CKMBINDEX, TROPONINI in the last 72 hours.   CBC:   Lab Results   Component Value Date/Time    WBC 6.9 05/09/2018 09:14 AM    RBC 4.67 05/09/2018 09:14 AM    HGB 13.5 05/09/2018 09:14 AM    HCT 39.8 05/09/2018 09:14 AM    MCV 85.1 05/09/2018 09:14 AM    MCH 29.0 05/09/2018 09:14 AM    MCHC 34.0 05/09/2018 09:14 AM    RDW 12.8 05/09/2018 09:14 AM     05/09/2018 09:14 AM    MPV 8.3 05/09/2018 09:14 AM     BMP:    Lab Results   Component Value Date/Time     11/10/2022 08:20 AM    K 4.7 11/10/2022 08:20 AM    K 3.9 05/09/2018 09:15 AM     11/10/2022 08:20 AM    CO2 23 11/10/2022 08:20 AM    BUN 11 11/10/2022 08:20 AM    LABALBU 4.4 11/10/2022 08:20 AM    CREATININE 1.2 11/10/2022 08:20 AM    CALCIUM 10.3 11/10/2022 08:20 AM    LABGLOM 47 11/10/2022 08:20 AM    GLUCOSE 140 11/10/2022 08:20 AM     Hepatic Function Panel:    Lab Results   Component Value Date/Time    ALKPHOS 73 11/10/2022 08:20 AM    ALT 9 11/10/2022 08:20 AM    AST 11 11/10/2022 08:20 AM    PROT 6.8 11/10/2022 08:20 AM    BILITOT 0.6 11/10/2022 08:20 AM    BILIDIR <0.2 11/10/2022 08:20 AM    LABALBU 4.4 11/10/2022 08:20 AM     Magnesium:    Lab Results   Component Value Date/Time    MG 2.0 11/10/2022 08:20 AM     Warfarin PT/INR:  No components found for: PTPATWAR, PTINRWAR  HgBA1c:  No results found for: LABA1C  FLP:    Lab Results   Component Value Date/Time    TRIG 169 11/10/2022 08:20 AM    HDL 49 11/10/2022 08:20 AM    LDLCALC 47 11/10/2022 08:20 AM    LDLDIRECT 168.41 12/10/2021 08:35 AM     TSH:  No results found for: TSH    Event Monitor reviewed  Revealed two episodes of atr fib with  to 150's on 03/22  For 2.5 strip and 03/23/2018 and 1 strip  And pat did not have  Any symptom while having it      Conclusions      Summary   Nonspecific ST-T wave changes. No ischemic EKG changes. There is mild attenuation artifact noted in the anterior wall seems to be   related to breast artifact. There was a small to moderate sized, mildly severe, partially reversible   myocardial perfusion defect of the anterior wall.    There was mild degree of ischemia in the anterior wall. Recommendation   Clinical correlation is recommended due to poor image quality. Signatures      ----------------------------------------------------------------   Electronically signed by Sd Cedillo MD (Interpreting   Cardiologist) on 04/18/2018 at 20:15        Cath   Conclusions    Procedure Summary   LM-PATENT   LAD PATENT   D2 OSTIAL 50% STENOSIS   LCX-PATENT   RCA- MID 35 TO 40% DIFFUSE LESIONS   Normal LV systolic function. LVEF approximately 60% . EDP 14 mmhg   No MR   NO transaortic Gradient      Recommendations   Continue with aggressive risk factor modification and medical therapy. Estimated Blood Loss:5 ml. Complications:No complications. Signatures    ----------------------------------------------------------------   Electronically signed by Sd Cedillo MD (Performing   Physician) on 05/09/2018 at 12:10   ----------------------------------------------------------------         EKG NSR , no acute abn 5/6/2020    Ekg 5/10/21  Sinus  Rhythm   Low voltage -possible pulmonary disease. ABNORMAL     Ekg 11/7/22  Sinus  Rhythm  -With rate variation   cv = 16. Low voltage in precordial leads.    -  Nonspecific T-abnormality. ABNORMAL       Ekg 1/20/23    Sinus  Rhythm  -With rate variation   cv = 16. Low voltage in precordial leads.    -  Nonspecific T-abnormality. ABNORMAL     Ekg 1/20/23  Atrial FIB with  bpm  No acute abn      Assessment       Diagnosis Orders   1. Atrial fibrillation with rapid ventricular response (HCC)  Echo transesophageal (DIAMANTE)    Cardioversion external      2. SOB (shortness of breath) on exertion  EKG 12 Lead    Echo transesophageal (DIAMANTE)    Cardioversion external      3. Paroxysmal atrial fibrillation (HCC)  EKG 12 Lead    Echo transesophageal (DIAMANTE)    Cardioversion external      4. Pure hypercholesterolemia  EKG 12 Lead      5. History of cardiac catheterization  EKG 12 Lead      6.  S/P cardiac cath 2018 - nonobstructive lesion  EKG 12 Lead    Cardioversion external            Plan     The most current meds and labs reviewed    Continue the current treatment and with constant vigilance to changes in symptoms and also any potential side effects. Return for care or seek medical attention immediately if symptoms got worse and/or develop new symptoms.]    Sob new worse  NO CLINICAL chf  RECURRENCE OF PERSISTNT Atrial fib RVR WITH SOB WORSE  DIAMANTE- CV  Cxr oct 2022 WNL  Start amiodarone 400 mg po bid  Increase lopressor 50 po bid from 12.5 po bid  Schedule DIAMANTE- CV ASAP  Hydration 64 oz  a day ( now drink 20 to 30 oz)      hX OF PAF- noted on event monitor 2018  Chads2-vasc=2  (age and female sex)  No dizziness   Doing well with lopressor  Cont  apixaban 5 mg po bid  CBC- Okay  Risk of OA has been informed including but not limited ICH  EPS -Dr. Hayley Shelton recommend med RX for now      Hx of Dizziness and palpitation- better  Abn nuc stress- but cath nonrevealing  Bedside orthostatic eval- negative  better    Hx of BPV  More in night when in bed and turns to Right  Vestibular rehabilation    Moderate Nonobstructive CAD  On med Rx     Hyperlipidemia: on statins, followed periodically. Patient need periodic lipid and liver profile. Cont crestor      Discussed use, benefit, and side effects of prescribed medications. All patient questions answered. Pt voiced understanding. Instructed to continue current medications, diet and exercise. Continue risk factor modification and medical management. Patient agreed with treatment plan. Follow up as directed.          I spent 40 minutes involved in face-to-face discussion of medical issues, prognosis, record review  and plan with the patient today and more than 50% of the time was spent on counseling and coordination of care        RTC  1 months      Corwin Verma, [facility]

## 2023-01-23 ENCOUNTER — APPOINTMENT (OUTPATIENT)
Dept: GENERAL RADIOLOGY | Age: 78
DRG: 286 | End: 2023-01-23
Payer: MEDICARE

## 2023-01-23 ENCOUNTER — HOSPITAL ENCOUNTER (INPATIENT)
Age: 78
LOS: 6 days | Discharge: HOME | DRG: 286 | End: 2023-01-29
Attending: EMERGENCY MEDICINE | Admitting: INTERNAL MEDICINE
Payer: MEDICARE

## 2023-01-23 DIAGNOSIS — E55.9 VITAMIN D DEFICIENCY: ICD-10-CM

## 2023-01-23 DIAGNOSIS — I48.11 LONGSTANDING PERSISTENT ATRIAL FIBRILLATION (HCC): Primary | ICD-10-CM

## 2023-01-23 DIAGNOSIS — I50.21 ACUTE SYSTOLIC CHF (CONGESTIVE HEART FAILURE), NYHA CLASS 3 (HCC): ICD-10-CM

## 2023-01-23 DIAGNOSIS — R79.89 ELEVATED BRAIN NATRIURETIC PEPTIDE (BNP) LEVEL: ICD-10-CM

## 2023-01-23 DIAGNOSIS — I48.91 ATRIAL FIBRILLATION WITH RAPID VENTRICULAR RESPONSE (HCC): ICD-10-CM

## 2023-01-23 LAB
ALBUMIN SERPL BCG-MCNC: 3.6 G/DL (ref 3.5–5.1)
ALP SERPL-CCNC: 153 U/L (ref 38–126)
ALT SERPL W/O P-5'-P-CCNC: 34 U/L (ref 11–66)
AMORPH SED URNS QL MICRO: ABNORMAL
AMPHETAMINES UR QL SCN: NEGATIVE
ANION GAP SERPL CALC-SCNC: 16 MEQ/L (ref 8–16)
AST SERPL-CCNC: 27 U/L (ref 5–40)
BACTERIA URNS QL MICRO: ABNORMAL /HPF
BARBITURATES UR QL SCN: NEGATIVE
BASOPHILS ABSOLUTE: 0.1 THOU/MM3 (ref 0–0.1)
BASOPHILS NFR BLD AUTO: 1 %
BENZODIAZ UR QL SCN: NEGATIVE
BILIRUB CONJ SERPL-MCNC: < 0.2 MG/DL (ref 0–0.3)
BILIRUB SERPL-MCNC: 0.8 MG/DL (ref 0.3–1.2)
BILIRUB UR QL STRIP.AUTO: NEGATIVE
BUN SERPL-MCNC: 19 MG/DL (ref 7–22)
BZE UR QL SCN: NEGATIVE
CALCIUM SERPL-MCNC: 8.7 MG/DL (ref 8.5–10.5)
CANNABINOIDS UR QL SCN: NEGATIVE
CASTS #/AREA URNS LPF: ABNORMAL /LPF
CASTS 2: ABNORMAL /LPF
CHARACTER UR: ABNORMAL
CHLORIDE SERPL-SCNC: 106 MEQ/L (ref 98–111)
CO2 SERPL-SCNC: 19 MEQ/L (ref 23–33)
COLOR: YELLOW
CREAT SERPL-MCNC: 1 MG/DL (ref 0.4–1.2)
CRYSTALS URNS MICRO: ABNORMAL
DEPRECATED RDW RBC AUTO: 46.3 FL (ref 35–45)
EKG Q-T INTERVAL: 248 MS
EKG QRS DURATION: 64 MS
EKG QTC CALCULATION (BAZETT): 388 MS
EKG R AXIS: 67 DEGREES
EKG T AXIS: -94 DEGREES
EKG VENTRICULAR RATE: 147 BPM
EOSINOPHIL NFR BLD AUTO: 1 %
EOSINOPHILS ABSOLUTE: 0.1 THOU/MM3 (ref 0–0.4)
EPITHELIAL CELLS, UA: ABNORMAL /HPF
ERYTHROCYTE [DISTWIDTH] IN BLOOD BY AUTOMATED COUNT: 14.9 % (ref 11.5–14.5)
ETHANOL SERPL-MCNC: < 0.01 %
FENTANYL: NEGATIVE
GFR SERPL CREATININE-BSD FRML MDRD: 58 ML/MIN/1.73M2
GLUCOSE SERPL-MCNC: 108 MG/DL (ref 70–108)
GLUCOSE UR QL STRIP.AUTO: NEGATIVE MG/DL
HCT VFR BLD AUTO: 43.2 % (ref 37–47)
HGB BLD-MCNC: 13.7 GM/DL (ref 12–16)
HGB UR QL STRIP.AUTO: ABNORMAL
IMM GRANULOCYTES # BLD AUTO: 0.03 THOU/MM3 (ref 0–0.07)
IMM GRANULOCYTES NFR BLD AUTO: 0.4 %
KETONES UR QL STRIP.AUTO: ABNORMAL
LIPASE SERPL-CCNC: 26.7 U/L (ref 5.6–51.3)
LYMPHOCYTES ABSOLUTE: 2.1 THOU/MM3 (ref 1–4.8)
LYMPHOCYTES NFR BLD AUTO: 24.5 %
MAGNESIUM SERPL-MCNC: 1.8 MG/DL (ref 1.6–2.4)
MCH RBC QN AUTO: 27 PG (ref 26–33)
MCHC RBC AUTO-ENTMCNC: 31.7 GM/DL (ref 32.2–35.5)
MCV RBC AUTO: 85 FL (ref 81–99)
MISCELLANEOUS 2: ABNORMAL
MONOCYTES ABSOLUTE: 0.6 THOU/MM3 (ref 0.4–1.3)
MONOCYTES NFR BLD AUTO: 7.6 %
NEUTROPHILS NFR BLD AUTO: 65.5 %
NITRITE UR QL STRIP: NEGATIVE
NRBC BLD AUTO-RTO: 0 /100 WBC
NT-PROBNP SERPL IA-MCNC: 5960 PG/ML (ref 0–449)
OPIATES UR QL SCN: NEGATIVE
OSMOLALITY SERPL CALC.SUM OF ELEC: 284 MOSMOL/KG (ref 275–300)
OXYCODONE: NEGATIVE
PCP UR QL SCN: NEGATIVE
PH UR STRIP.AUTO: 5 [PH] (ref 5–9)
PLATELET # BLD AUTO: 278 THOU/MM3 (ref 130–400)
PMV BLD AUTO: 10.4 FL (ref 9.4–12.4)
POTASSIUM SERPL-SCNC: 4 MEQ/L (ref 3.5–5.2)
PROT SERPL-MCNC: 5.9 G/DL (ref 6.1–8)
PROT UR STRIP.AUTO-MCNC: NEGATIVE MG/DL
RBC # BLD AUTO: 5.08 MILL/MM3 (ref 4.2–5.4)
RBC URINE: ABNORMAL /HPF
RENAL EPI CELLS #/AREA URNS HPF: ABNORMAL /[HPF]
SEGMENTED NEUTROPHILS ABSOLUTE COUNT: 5.5 THOU/MM3 (ref 1.8–7.7)
SODIUM SERPL-SCNC: 141 MEQ/L (ref 135–145)
SP GR UR REFRACT.AUTO: 1.02 (ref 1–1.03)
T4 FREE SERPL-MCNC: 1.37 NG/DL (ref 0.93–1.76)
TROPONIN T: < 0.01 NG/ML
TSH SERPL DL<=0.005 MIU/L-ACNC: 7.31 UIU/ML (ref 0.4–4.2)
UROBILINOGEN, URINE: 0.2 EU/DL (ref 0–1)
WBC # BLD AUTO: 8.4 THOU/MM3 (ref 4.8–10.8)
WBC #/AREA URNS HPF: ABNORMAL /HPF
WBC #/AREA URNS HPF: NEGATIVE /[HPF]
YEAST LIKE FUNGI URNS QL MICRO: ABNORMAL

## 2023-01-23 PROCEDURE — 93010 ELECTROCARDIOGRAM REPORT: CPT | Performed by: INTERNAL MEDICINE

## 2023-01-23 PROCEDURE — 82248 BILIRUBIN DIRECT: CPT

## 2023-01-23 PROCEDURE — 80053 COMPREHEN METABOLIC PANEL: CPT

## 2023-01-23 PROCEDURE — 84484 ASSAY OF TROPONIN QUANT: CPT

## 2023-01-23 PROCEDURE — 36415 COLL VENOUS BLD VENIPUNCTURE: CPT

## 2023-01-23 PROCEDURE — 2500000003 HC RX 250 WO HCPCS

## 2023-01-23 PROCEDURE — 1200000003 HC TELEMETRY R&B

## 2023-01-23 PROCEDURE — 93005 ELECTROCARDIOGRAM TRACING: CPT | Performed by: EMERGENCY MEDICINE

## 2023-01-23 PROCEDURE — 99223 1ST HOSP IP/OBS HIGH 75: CPT

## 2023-01-23 PROCEDURE — 83690 ASSAY OF LIPASE: CPT

## 2023-01-23 PROCEDURE — 85025 COMPLETE CBC W/AUTO DIFF WBC: CPT

## 2023-01-23 PROCEDURE — 96365 THER/PROPH/DIAG IV INF INIT: CPT

## 2023-01-23 PROCEDURE — 82077 ASSAY SPEC XCP UR&BREATH IA: CPT

## 2023-01-23 PROCEDURE — 81001 URINALYSIS AUTO W/SCOPE: CPT

## 2023-01-23 PROCEDURE — 84443 ASSAY THYROID STIM HORMONE: CPT

## 2023-01-23 PROCEDURE — 83735 ASSAY OF MAGNESIUM: CPT

## 2023-01-23 PROCEDURE — 84439 ASSAY OF FREE THYROXINE: CPT

## 2023-01-23 PROCEDURE — 80307 DRUG TEST PRSMV CHEM ANLYZR: CPT

## 2023-01-23 PROCEDURE — 6370000000 HC RX 637 (ALT 250 FOR IP)

## 2023-01-23 PROCEDURE — 96375 TX/PRO/DX INJ NEW DRUG ADDON: CPT

## 2023-01-23 PROCEDURE — 1200000000 HC SEMI PRIVATE

## 2023-01-23 PROCEDURE — 99285 EMERGENCY DEPT VISIT HI MDM: CPT

## 2023-01-23 PROCEDURE — 2500000003 HC RX 250 WO HCPCS: Performed by: INTERNAL MEDICINE

## 2023-01-23 PROCEDURE — 71045 X-RAY EXAM CHEST 1 VIEW: CPT

## 2023-01-23 PROCEDURE — 83880 ASSAY OF NATRIURETIC PEPTIDE: CPT

## 2023-01-23 RX ORDER — ACETAMINOPHEN 325 MG/1
650 TABLET ORAL EVERY 6 HOURS PRN
Status: DISCONTINUED | OUTPATIENT
Start: 2023-01-23 | End: 2023-01-28 | Stop reason: SDUPTHER

## 2023-01-23 RX ORDER — SODIUM CHLORIDE 0.9 % (FLUSH) 0.9 %
5-40 SYRINGE (ML) INJECTION EVERY 12 HOURS SCHEDULED
Status: DISCONTINUED | OUTPATIENT
Start: 2023-01-23 | End: 2023-01-29 | Stop reason: HOSPADM

## 2023-01-23 RX ORDER — AMIODARONE HYDROCHLORIDE 200 MG/1
TABLET ORAL
Qty: 360 TABLET | Refills: 0 | Status: ON HOLD | OUTPATIENT
Start: 2023-01-23 | End: 2023-01-29 | Stop reason: HOSPADM

## 2023-01-23 RX ORDER — POLYETHYLENE GLYCOL 3350 17 G/17G
17 POWDER, FOR SOLUTION ORAL DAILY PRN
Status: DISCONTINUED | OUTPATIENT
Start: 2023-01-23 | End: 2023-01-29 | Stop reason: HOSPADM

## 2023-01-23 RX ORDER — ROSUVASTATIN CALCIUM 20 MG/1
20 TABLET, COATED ORAL NIGHTLY
Status: DISCONTINUED | OUTPATIENT
Start: 2023-01-23 | End: 2023-01-29 | Stop reason: HOSPADM

## 2023-01-23 RX ORDER — AMIODARONE HYDROCHLORIDE 200 MG/1
400 TABLET ORAL 2 TIMES DAILY
Status: DISCONTINUED | OUTPATIENT
Start: 2023-01-23 | End: 2023-01-25 | Stop reason: ALTCHOICE

## 2023-01-23 RX ORDER — AMIODARONE HYDROCHLORIDE 100 MG/1
TABLET ORAL
Qty: 180 TABLET | Refills: 3 | Status: ON HOLD | OUTPATIENT
Start: 2023-01-23 | End: 2023-01-29 | Stop reason: HOSPADM

## 2023-01-23 RX ORDER — METOPROLOL TARTRATE 5 MG/5ML
2.5 INJECTION INTRAVENOUS ONCE
Status: COMPLETED | OUTPATIENT
Start: 2023-01-23 | End: 2023-01-23

## 2023-01-23 RX ORDER — MULTIVITAMIN WITH IRON
1 TABLET ORAL DAILY
Status: DISCONTINUED | OUTPATIENT
Start: 2023-01-23 | End: 2023-01-29 | Stop reason: HOSPADM

## 2023-01-23 RX ORDER — DILTIAZEM HYDROCHLORIDE 5 MG/ML
0.25 INJECTION INTRAVENOUS ONCE
Status: COMPLETED | OUTPATIENT
Start: 2023-01-23 | End: 2023-01-23

## 2023-01-23 RX ORDER — SODIUM CHLORIDE 9 MG/ML
INJECTION, SOLUTION INTRAVENOUS PRN
Status: DISCONTINUED | OUTPATIENT
Start: 2023-01-23 | End: 2023-01-29 | Stop reason: HOSPADM

## 2023-01-23 RX ORDER — METOPROLOL TARTRATE 50 MG/1
50 TABLET, FILM COATED ORAL 2 TIMES DAILY
Status: DISCONTINUED | OUTPATIENT
Start: 2023-01-23 | End: 2023-01-29

## 2023-01-23 RX ORDER — SODIUM CHLORIDE 0.9 % (FLUSH) 0.9 %
5-40 SYRINGE (ML) INJECTION PRN
Status: DISCONTINUED | OUTPATIENT
Start: 2023-01-23 | End: 2023-01-29 | Stop reason: HOSPADM

## 2023-01-23 RX ORDER — ONDANSETRON 2 MG/ML
4 INJECTION INTRAMUSCULAR; INTRAVENOUS EVERY 6 HOURS PRN
Status: DISCONTINUED | OUTPATIENT
Start: 2023-01-23 | End: 2023-01-29 | Stop reason: HOSPADM

## 2023-01-23 RX ORDER — ONDANSETRON 4 MG/1
4 TABLET, ORALLY DISINTEGRATING ORAL EVERY 8 HOURS PRN
Status: DISCONTINUED | OUTPATIENT
Start: 2023-01-23 | End: 2023-01-29 | Stop reason: HOSPADM

## 2023-01-23 RX ORDER — CALCIUM CARBONATE 200(500)MG
500 TABLET,CHEWABLE ORAL 2 TIMES DAILY PRN
Status: DISCONTINUED | OUTPATIENT
Start: 2023-01-23 | End: 2023-01-29 | Stop reason: HOSPADM

## 2023-01-23 RX ORDER — SERTRALINE HYDROCHLORIDE 25 MG/1
25 TABLET, FILM COATED ORAL DAILY
Status: DISCONTINUED | OUTPATIENT
Start: 2023-01-23 | End: 2023-01-29 | Stop reason: HOSPADM

## 2023-01-23 RX ORDER — ACETAMINOPHEN 650 MG/1
650 SUPPOSITORY RECTAL EVERY 6 HOURS PRN
Status: DISCONTINUED | OUTPATIENT
Start: 2023-01-23 | End: 2023-01-28 | Stop reason: SDUPTHER

## 2023-01-23 RX ADMIN — METOPROLOL TARTRATE 2.5 MG: 5 INJECTION, SOLUTION INTRAVENOUS at 20:33

## 2023-01-23 RX ADMIN — SERTRALINE 25 MG: 25 TABLET, FILM COATED ORAL at 19:49

## 2023-01-23 RX ADMIN — AMIODARONE HYDROCHLORIDE 1 MG/MIN: 1.8 INJECTION, SOLUTION INTRAVENOUS at 14:35

## 2023-01-23 RX ADMIN — ROSUVASTATIN CALCIUM 20 MG: 20 TABLET, FILM COATED ORAL at 19:49

## 2023-01-23 RX ADMIN — APIXABAN 5 MG: 5 TABLET, FILM COATED ORAL at 19:49

## 2023-01-23 RX ADMIN — AMIODARONE HYDROCHLORIDE 0.5 MG/MIN: 1.8 INJECTION, SOLUTION INTRAVENOUS at 19:52

## 2023-01-23 RX ADMIN — Medication 1 TABLET: at 19:50

## 2023-01-23 RX ADMIN — DILTIAZEM HYDROCHLORIDE 12.5 MG: 5 INJECTION INTRAVENOUS at 10:35

## 2023-01-23 RX ADMIN — AMIODARONE HYDROCHLORIDE 150 MG: 1.5 INJECTION, SOLUTION INTRAVENOUS at 12:40

## 2023-01-23 RX ADMIN — METOPROLOL TARTRATE 50 MG: 50 TABLET, FILM COATED ORAL at 19:49

## 2023-01-23 ASSESSMENT — LIFESTYLE VARIABLES
HOW MANY STANDARD DRINKS CONTAINING ALCOHOL DO YOU HAVE ON A TYPICAL DAY: 1 OR 2
HOW OFTEN DO YOU HAVE A DRINK CONTAINING ALCOHOL: MONTHLY OR LESS

## 2023-01-23 ASSESSMENT — PAIN - FUNCTIONAL ASSESSMENT
PAIN_FUNCTIONAL_ASSESSMENT: NONE - DENIES PAIN

## 2023-01-23 ASSESSMENT — PAIN SCALES - GENERAL: PAINLEVEL_OUTOF10: 0

## 2023-01-23 NOTE — CARE COORDINATION
Spoke with patient and daughter who advised has ACP documents at home. Daughter will attempt to locate and bring in this visit. Denied concerns or questions.

## 2023-01-23 NOTE — ED PROVIDER NOTES
I performed a history and physical examination of the patient and discussed management with the resident. I reviewed the residents note and agree with the documented findings and plan of care. Any areas of disagreement are noted on the chart. I was personally present for the key portions of any procedures. I have documented in the chart those procedures where I was not present during the key portions. I have reviewed the emergency nurses triage note. I agree with the chief complaint, past medical history, past surgical history, allergies, medications, social and family history as documented unless otherwise noted below. Documentation of the HPI, Physical Exam and Medical Decision Making performed by medical students or scribes is based on my personal performance of the HPI, PE and MDM. For Phys Assistant/ Nurse Practitioner cases/documentation I have personally evaluated this patient and have completed at least one if not all key elements of the E/M (history, physical exam, and MDM). My findings are as noted below. In other words, I personally saw and examined the patient I have reviewed and agreed with the resident findings including all diagnostic interpretations and treatment plans as written. I was present for the key portion of any procedures performed and the inclusive time noted in any critical care statement. Patient coming in complaining of shortness of breath for 1 month. Patient does have a history of atrial fibrillation. She was just at her cardiologist she had her metoprolol increased and she had amiodarone placed. Patient currently has Eliquis as one of her medications. Patient denies any overt chest pain. She has not had any nausea or vomiting. No syncopal or presyncopal episodes. Patient is otherwise resting comfortably on cot no apparent distress no other physical complaints at this time. Here today physical exam does not reveal any significant findings.       EKG reveals heart rate and rhythm are irregularly irregular, ventricular rate of 147, normal axis, CA interval indeterminant QRS duration 64 QT interval 248 QTC of 388. Cardiac catheterization  Conclusions      Procedure Summary   LM-PATENT   LAD PATENT   D2 OSTIAL 50% STENOSIS   LCX-PATENT   RCA- MID 35 TO 40% DIFFUSE LESIONS   Normal LV systolic function. LVEF approximately 60% . EDP 14 mmhg   No MR   NO transaortic Gradient      Recommendations   Continue with aggressive risk factor modification and medical therapy. Estimated Blood Loss:5 ml. Complications:No complications. Signatures      ----------------------------------------------------------------   Electronically signed by Devante Anthony MD (Performing   Physician) on 05/09/2018 at 12:10   ----------------------------------------------------------------  Echocardiogram  Conclusions      Summary   Ejection fraction is visually estimated at 50%. Overall left ventricular function is normal.      Signature      ----------------------------------------------------------------   Electronically signed by Austin Adams MD (Interpreting   physician) on 03/07/2018 at 04:58 PM   ----------------------------------------------------------------  Stress test   Conclusions      Summary   Nonspecific ST-T wave changes. No ischemic EKG changes. There is mild attenuation artifact noted in the anterior wall seems to be   related to breast artifact. There was a small to moderate sized, mildly severe, partially reversible   myocardial perfusion defect of the anterior wall. There was mild degree of ischemia in the anterior wall. Recommendation   Clinical correlation is recommended due to poor image quality.       Signatures      ----------------------------------------------------------------   Electronically signed by Devante CarrilloInterpreting   Cardiologist) on 04/18/2018 at 20:15   ----------------------------------------------------------------    XR CHEST PORTABLE   Final Result   Patchy infiltrate left lower lobe. Subsegmental atelectasis right base. Silhouetting left hemidiaphragm suggesting possible pleural effusion. Cardiomegaly            **This report has been created using voice recognition software. It may contain minor errors which are inherent in voice recognition technology. **      Final report electronically signed by Dr. Jens Melendez on 1/23/2023 10:07 AM        Labs Reviewed   CBC WITH AUTO DIFFERENTIAL - Abnormal; Notable for the following components:       Result Value    MCHC 31.7 (*)     RDW-CV 14.9 (*)     RDW-SD 46.3 (*)     All other components within normal limits   BRAIN NATRIURETIC PEPTIDE - Abnormal; Notable for the following components:    Pro-BNP 5960.0 (*)     All other components within normal limits   BASIC METABOLIC PANEL - Abnormal; Notable for the following components:    CO2 19 (*)     All other components within normal limits   HEPATIC FUNCTION PANEL - Abnormal; Notable for the following components:    Alkaline Phosphatase 153 (*)     Total Protein 5.9 (*)     All other components within normal limits   TSH - Abnormal; Notable for the following components:    TSH 7.310 (*)     All other components within normal limits   GLOMERULAR FILTRATION RATE, ESTIMATED - Abnormal; Notable for the following components:    Est, Glom Filt Rate 58 (*)     All other components within normal limits   URINE WITH REFLEXED MICRO - Abnormal; Notable for the following components:    Ketones, Urine TRACE (*)     Blood, Urine TRACE (*)     All other components within normal limits   LIPASE   TROPONIN   MAGNESIUM   ETHANOL   URINE DRUG SCREEN   ANION GAP   OSMOLALITY   T4, FREE         Final diagnoses:   Longstanding persistent atrial fibrillation (HCC)   Atrial fibrillation with rapid ventricular response (HCC)   Elevated brain natriuretic peptide (BNP) level   .   I have seen this patient with the resident Dr. Chris Peres and agree with his assessment and plan.      Elmo Peterson, DO  01/23/23 8947

## 2023-01-23 NOTE — CONSULTS
[facility]    Patient's Name/Date of Birth: Brenna Shelton / 1945 (9 y.o.)    Date: January 23, 2023     Referring Provider: Agnieszak Lopes MD    CHIEF COMPLAINT: A fib with RVR, SOB      HPI: This is a pleasant 69 y.o. female presents with SOB that has been present for 1 month. Patient states that it is constant and is made worse with exertion. Patient was seen by Dr. Wily Gaming on Friday and had scheduled DIAMANTE/DCCV for February 1st for A fib with RVR. Patient states she was unable to wait that long. Patient denies any chest pain, palpitations. Patient states that she has not previously had palpitations with her A fib, and her only symptom with A fib is SOB. Patient states that at her appointment on Friday, Dr. Wily Gaming increased her Lopressor and increased her amiodarone. Patient states that this has had no affect on her SOB. Patient reports compliance with her medications. Patient denies any recent Echo after Echo in 2018. Patient denies any history of HTN. Patient denies any previous MI or CVA. Echo: 3/7/2018  Summary   Ejection fraction is visually estimated at 50%.    Overall left ventricular function is normal.       All labs, EKG's, diagnostic testing and images as well as cardiac cath, stress testing were reviewed during this encounter    Past Medical History:   Diagnosis Date    Adenomatous colon polyp 2006    Coronary artery disease involving native coronary artery of native heart without angina pectoris 5/6/2020    Fibrocystic breast disease 1990    Hyperlipidemia 1990    MVP (mitral valve prolapse) 1997    Osteopenia 02/2018    PONV (postoperative nausea and vomiting)     Vitamin D deficiency 03/2018     Past Surgical History:   Procedure Laterality Date    COLONOSCOPY  2011    [facility]    COLONOSCOPY  2006    [facility]    COLONOSCOPY  01/25/2017    Dr. Jomar Sarah (CERVIX STATUS UNKNOWN)  1991    SHAMA AND BSO (CERVIX REMOVED)  1991 TONSILLECTOMY  1980     Current Facility-Administered Medications   Medication Dose Route Frequency Provider Last Rate Last Admin    amiodarone (NEXTERONE) 360 mg in dextrose 5% 200 ml  1 mg/min IntraVENous Continuous Blanka Villatoro PA-C 33.3 mL/hr at 01/23/23 1435 1 mg/min at 01/23/23 1435    Followed by    amiodarone (NEXTERONE) 360 mg in dextrose 5% 200 ml  0.5 mg/min IntraVENous Continuous NANCY OcampoC        [Held by provider] amiodarone (CORDARONE) tablet 400 mg  400 mg Oral BID Blanka Villatoro, PA-C        apixaban (ELIQUIS) tablet 5 mg  5 mg Oral BID Blanka Villatoor, PA-C        calcium carbonate tablet 600 mg  600 mg Oral BID PRN Blanka Villatoro, PA-C        metoprolol tartrate (LOPRESSOR) tablet 50 mg  50 mg Oral BID Blanka Villatoro, PA-C        rosuvastatin (CRESTOR) tablet 20 mg  1 tablet Oral Daily Blanka Childt, PA-C        sertraline (ZOLOFT) tablet 25 mg  25 mg Oral Daily Blanka Villatoro, PA-C        therapeutic multivitamin-minerals 1 tablet  1 tablet Oral Daily Blanka Villatoro, PA-C        sodium chloride flush 0.9 % injection 5-40 mL  5-40 mL IntraVENous 2 times per day Blanka Villatoro, PA-C        sodium chloride flush 0.9 % injection 5-40 mL  5-40 mL IntraVENous PRN Blanka Villatoro, PA-C        0.9 % sodium chloride infusion   IntraVENous PRN Blanka Villatoro, PA-C        ondansetron (ZOFRAN-ODT) disintegrating tablet 4 mg  4 mg Oral Q8H PRN HORACIO Ocampo-SE        Or    ondansetron (ZOFRAN) injection 4 mg  4 mg IntraVENous Q6H PRN Blanka Childt, PA-C        polyethylene glycol (GLYCOLAX) packet 17 g  17 g Oral Daily PRN Blanka Villatoro, PA-C        acetaminophen (TYLENOL) tablet 650 mg  650 mg Oral Q6H PRN Blanka Villatoro PA-C        Or    acetaminophen (TYLENOL) suppository 650 mg  650 mg Rectal Q6H PRN Blanka Villatoro PA-C         Prior to Admission medications    Medication Sig Start Date End Date Taking? Authorizing Provider   amiodarone (CORDARONE) 200 MG tablet TAKE 2 TABLETS BY MOUTH  TWICE DAILY 1/23/23   FELICITAS Breen CNP   amiodarone (PACERONE) 100 MG tablet TAKE 1 TABLET BY MOUTH TWICE DAILY 1/23/23   FELICITAS Breen CNP   metoprolol tartrate (LOPRESSOR) 50 MG tablet Take 1 tablet by mouth 2 times daily 1/20/23   Brian Lofton MD   amiodarone (PACERONE) 400 MG tablet Take 1 tablet by mouth 2 times daily 1/20/23   Brian Lofton MD   apixaban (ELIQUIS) 5 MG TABS tablet TAKE 1 TABLET TWICE A DAY 1/20/23   Brian Lofton MD   sertraline (ZOLOFT) 25 MG tablet Take 1 tablet by mouth daily 11/28/22   Obey Dillon MD   rosuvastatin (CRESTOR) 20 MG tablet TAKE 1 TABLET DAILY 11/10/22   Brian Lofton MD   Calcium Carbonate (CALCIUM 600 PO) Take by mouth    Historical Provider, MD   Multiple Vitamins-Minerals (THERAPEUTIC MULTIVITAMIN-MINERALS) tablet Take 1 tablet by mouth daily    Historical Provider, MD   betamethasone valerate (VALISONE) 0.1 % cream Apply topically 2 times daily. 6/10/22   FELICITAS Fraga CNP   Cholecalciferol (VITAMIN D3) 5000 units CAPS Take 1 capsule by mouth Daily  Patient taking differently: Take 1 capsule by mouth See Admin Instructions 3 times a week 3/8/18   Obey Dillon MD   Scheduled Meds:   [Held by provider] amiodarone  400 mg Oral BID    apixaban  5 mg Oral BID    metoprolol tartrate  50 mg Oral BID    rosuvastatin  1 tablet Oral Daily    sertraline  25 mg Oral Daily    therapeutic multivitamin-minerals  1 tablet Oral Daily    sodium chloride flush  5-40 mL IntraVENous 2 times per day     Continuous Infusions:   amiodarone 1 mg/min (01/23/23 1435)    Followed by    amiodarone      sodium chloride       PRN Meds:. No Known Allergies  Family History   Problem Relation Age of Onset    Stroke Mother     Heart Disease Mother     Cancer Sister     Breast Cancer Maternal Aunt 64     Social History     Socioeconomic History    Marital status:       Spouse name: Not on file    Number of children: Not on file    Years of education: Not on file    Highest education level: Not on file   Occupational History    Not on file   Tobacco Use    Smoking status: Former     Packs/day: 0.50     Years: 4.00     Pack years: 2.00     Types: Cigarettes     Quit date: 26     Years since quittin.0    Smokeless tobacco: Never   Vaping Use    Vaping Use: Never used   Substance and Sexual Activity    Alcohol use: Yes     Comment: occsional 2-3 times a month    Drug use: No    Sexual activity: Not on file   Other Topics Concern    Not on file   Social History Narrative    Not on file     Social Determinants of Health     Financial Resource Strain: Low Risk     Difficulty of Paying Living Expenses: Not hard at all   Food Insecurity: No Food Insecurity    Worried About Running Out of Food in the Last Year: Never true    Ran Out of Food in the Last Year: Never true   Transportation Needs: Not on file   Physical Activity: Unknown    Days of Exercise per Week: 0 days    Minutes of Exercise per Session: Not on file   Stress: Not on file   Social Connections: Not on file   Intimate Partner Violence: Not on file   Housing Stability: Not on file     ROS:   Constitutional: Denies any recent wt change. Eyes:  Denies any blurring or double vision, no glaucoma  Ears/Nose/Mouth/Throat:  Denies any chronic sinus/rhinitis, bleeding gums  Cardiovascular:  As described above. Respiratory:  Denies any frequent cough, wheezing or coughing up blood  Genitourinary:  Denies difficulty with urination and kidney stones  Gastrointestinal:  Denies any chronic problems with abdominal pain, nausea, vomiting or diarrhea  Musculoskeletal:  Denies any joint pain, back pain, or difficulty walking  Integumentary:  Denies any rash  Neurological:  No numbness or tingling  Endocrine:  Denies any polydipsia. Hematologic/Lymphatic:  Denies any hemorrhage or lymphatic drainage problems.   Labs:  CBC:   Recent Labs     23  0930   WBC 8.4   HGB 13.7   HCT 43.2   MCV 85.0      BMP:   Recent Labs     01/23/23  0930      K 4.0      CO2 19*   BUN 19   CREATININE 1.0   MG 1.8     Accucheck Glucoses: No results for input(s): POCGLU in the last 72 hours. Cardiac Enzymes: No results for input(s): CKTOTAL, CKMB, CKMBINDEX, TROPONINI in the last 72 hours. PT/INR: No results for input(s): PROTIME, INR in the last 72 hours. APTT: No results for input(s): APTT in the last 72 hours. Liver Profile:  Lab Results   Component Value Date/Time    AST 27 01/23/2023 09:30 AM    ALT 34 01/23/2023 09:30 AM    BILIDIR <0.2 01/23/2023 09:30 AM    BILITOT 0.8 01/23/2023 09:30 AM    ALKPHOS 153 01/23/2023 09:30 AM     Lab Results   Component Value Date/Time    CHOL 130 11/10/2022 08:20 AM    HDL 49 11/10/2022 08:20 AM    TRIG 169 11/10/2022 08:20 AM     TSH:   Lab Results   Component Value Date/Time    TSH 7.310 01/23/2023 09:30 AM     UA:   Lab Results   Component Value Date/Time    COLORU YELLOW 01/23/2023 12:45 PM    PHUR 5.0 01/23/2023 12:45 PM    WBCUA 0-2 01/23/2023 12:45 PM    RBCUA 0-2 01/23/2023 12:45 PM    YEAST NONE SEEN 01/23/2023 12:45 PM    BACTERIA FEW 01/23/2023 12:45 PM    LEUKOCYTESUR NEGATIVE 01/23/2023 12:45 PM    UROBILINOGEN 0.2 01/23/2023 12:45 PM    BILIRUBINUR NEGATIVE 01/23/2023 12:45 PM    BLOODU TRACE 01/23/2023 12:45 PM    GLUCOSEU NEGATIVE 01/23/2023 12:45 PM    AMORPHOUS URATES 01/23/2023 12:45 PM         Physical Exam:  Vitals:    01/23/23 1427   BP: (!) 111/94   Pulse: (!) 126   Resp: 22   Temp: 97.7 °F (36.5 °C)   SpO2: 97%    No intake or output data in the 24 hours ending 01/23/23 7235   General:  No acute distress  Neck: Supple, no JVD, no carotid bruits  Heart: Irregularly irregular.    Lungs: clear to ascultation no rales, wheezes, or rhonchi  Abdomen: positive bowel sounds, soft, non-tender, non-distended, no bruits, no masses  Extremities:no clubbing, cyanosis or edema  Neurologic: alert and oriented x 3, cranial nerves 2-12 grossly intact, motor and sensory intact, moving all extremities  Skin: No rashes  Psych: AO x 3, no depression/jaclyn, no pressured speech, normal affect  Lymph: No obvious LAD      Assessment & Plan:  Atrial Fibrillation with RVR: EKG shows Atrial fibrillation with RVR. Patient reports SOB for the past month that is constant and worse with exertion. Patient denies any palpitations or chest pain. Troponin < 0.010. Pro-BNP 5960.0, no previous to compare. [id number] 3. Echo 2018 shows EF 60% with normal LV function. At home patient is on amiodarone 400 mg BID, Lopressor 50 mg BID, Eliquis 5 mg BID. Patient states she has been on Eliquis for at least 10 years. Patient placed on amiodarone drip in ED. Continue amiodarone drip. Continue home Lopressor and home Eliquis. Patient requires DIAMANTE/DCCV. Nonobstructive CAD: LHC on 5/9/2018 shows Left Main patent, LAD patent, D2 ostial 0 % stenosis, Lcx patent, RCA mid 3-40% stenosis. Patient denies any current chest pain. Troponin < 0.010. Thank you for allowing us to participate in the care of this patient. Please do not hesitate to call us with questions. Electronically signed by Darwin Palencia DO on 1/23/2023 at 4:38 PM    Attending Supervising Physician's Attestation Statement  I performed a history and physical examination on the patient and discussed the management with the resident physician. I reviewed and agree with the findings and plan as documented in the resident's note except for as noted below. Preserved Ef, with Afib RVR exacerbation that is symptomatic. Last TTE 60%. She is on IV Amio. Has been on Eliquis for years. Plan DIAMANTE-DCCV.  NPO. BNP elevated, will consider diuresis, although appears comfortable. Further recommendations based on results and clinical course.     Electronically signed by Stacy Jackson MD on 1/24/23 at 9:50 AM EST  Interventional Cardiology - [facility]

## 2023-01-23 NOTE — ED NOTES
Pt resting on cot with daughter at bedside. Medicated with diltiazem, pt HR down to 108. Pt updated on poc at this time. Call light in reach. No concerns voiced.      Char Lanes, RN  01/23/23 0153

## 2023-01-23 NOTE — ED NOTES
Assumed care of pt at this time. Received report from  [facility].      Get Duncan RN  01/23/23 2150

## 2023-01-23 NOTE — H&P
Hospitalist History & Physical    Patient:  Jonh Cardozo    Unit/Bed:06/006A  YOB: 1945  MRN: [id number]   Acct: [de-identified]   PCP: Darlene Hooks MD  Code Status: Prior    Date of Service: Pt seen/examined on 01/23/23 and admitted to Inpatient with expected LOS greater than two midnights due to medical therapy. Chief Complaint: shortness of breath    Assessment/Plan:    Atrial Fibrillation with RVR on OAC and rate control: On Xarelto and amiodarone 400 mg and metoprolol 50 mg. PO Amio held, on amio gtt. Initially the ER attempted a cardizem gtt but it did not help the HR and caused soft BP. Continue Amio gtt to lower HR and monitor on tele. CXR shows cardiomegaly and possible right pleural effusions. Echo pending. Cardiology consulted- during recent office visit, Dr. Alverto Cifuentes recommended cardioversion. He also increased her dose of amio and metoprolol at this point  Troponin negative, BNP elevated. TSH elevated, free T4 wnl. Nonobstructive CAD: cardiac cath 05/2018 showed  RCA and D2 ostial stenosis. No stents placed at that time. Monitor with tele. Hyperlipidemia: on statin    Depression: on zoloft    Obesity: BMI 30.73 kg/m2      History of Present Illness:  John Cardozo is a 69 y.o. female with PMHx of afib, CAD, hyperlipidemia, and depression who [facility] with chief complaint of shortness of breath. Patient reports a 1 month history of being short of breath. She states it is worse with lying down and with exertion. It has not gotten progressively worse over the course of the last month, but it is continuous. States she came to the ER because she thought that she could have her cardioversion procedure done in the ER. She had a appointment with Dr. Kali Rollins 3 days ago for her A. fib with RVR where he increased her dose of amiodarone and her beta-blocker. She said at this point it has not helped in decreasing her shortness of breath.   She has noted some swelling in her legs. Denies recent illness. Denies lightheadedness/dizziness, chest pain, N/V, abdominal pain, and urinary symptoms. Review of Systems: Pertinent positives as noted in the HPI. All other systems reviewed and negative. Past Medical History:        Diagnosis Date    Adenomatous colon polyp 2006    Coronary artery disease involving native coronary artery of native heart without angina pectoris 5/6/2020    Fibrocystic breast disease 1990    Hyperlipidemia 1990    MVP (mitral valve prolapse) 1997    Osteopenia 02/2018    PONV (postoperative nausea and vomiting)     Vitamin D deficiency 03/2018       Past Surgical History:        Procedure Laterality Date    COLONOSCOPY  2011    [facility]    COLONOSCOPY  2006    [facility]    COLONOSCOPY  01/25/2017    Dr. Mayito Milian (CERVIX STATUS UNKNOWN)  1991    SHAMA AND BSO (CERVIX REMOVED)  R Yousuf Randle 68 Medications:   No current facility-administered medications on file prior to encounter. Current Outpatient Medications on File Prior to Encounter   Medication Sig Dispense Refill    amiodarone (CORDARONE) 200 MG tablet TAKE 2 TABLETS BY MOUTH TWICE DAILY 360 tablet 0    amiodarone (PACERONE) 100 MG tablet TAKE 1 TABLET BY MOUTH TWICE DAILY 180 tablet 3    metoprolol tartrate (LOPRESSOR) 50 MG tablet Take 1 tablet by mouth 2 times daily 60 tablet 3    amiodarone (PACERONE) 400 MG tablet Take 1 tablet by mouth 2 times daily 60 tablet 0    apixaban (ELIQUIS) 5 MG TABS tablet TAKE 1 TABLET TWICE A DAY 60 tablet 0    sertraline (ZOLOFT) 25 MG tablet Take 1 tablet by mouth daily 90 tablet 3    rosuvastatin (CRESTOR) 20 MG tablet TAKE 1 TABLET DAILY 90 tablet 3    Calcium Carbonate (CALCIUM 600 PO) Take by mouth      Multiple Vitamins-Minerals (THERAPEUTIC MULTIVITAMIN-MINERALS) tablet Take 1 tablet by mouth daily      betamethasone valerate (VALISONE) 0.1 % cream Apply topically 2 times daily.  15 g 0    Cholecalciferol (VITAMIN D3) 5000 units CAPS Take 1 capsule by mouth Daily (Patient taking differently: Take 1 capsule by mouth See Admin Instructions 3 times a week)         Allergies:    Patient has no known allergies. Social History:    reports that she quit smoking about 55 years ago. Her smoking use included cigarettes. She has a 2.00 pack-year smoking history. She has never used smokeless tobacco. She reports current alcohol use. She reports that she does not use drugs. Family History:       Problem Relation Age of Onset    Stroke Mother     Heart Disease Mother     Cancer Sister     Breast Cancer Maternal Aunt 64       Diet:  No diet orders on file      Physical Exam:  /78   Pulse (!) 117   Temp 97.5 °F (36.4 °C) (Oral)   Resp 19   Ht 5' 2\" (1.575 m)   Wt 168 lb (76.2 kg)   SpO2 96%   BMI 30.73 kg/m²   General:   Pleasant female resting comfortably in bed. No apparent distress. HEENT:  normocephalic and atraumatic. No scleral icterus. PERR. Neck: supple. No JVD. Trachea midline  Lungs: clear to auscultation. No retractions  Cardiac: Irregularly irregular rhythm without murmur. Abdomen: soft. Nontender. Bowel sounds positive. Extremities:  No clubbing, cyanosis. 1+ pitting edema in bilateral lower extremities. Vasculature: capillary refill < 3 seconds. Palpable LE pulses bilaterally. Skin:  warm and dry. No rashes or lesions. Psych:  Alert and oriented x3. Affect appropriate  Neurologic:  No focal deficit. No seizures. Data: (All radiographs, tracings, PFTs, and imaging are personally viewed and interpreted unless otherwise noted)  Labs:   Recent Labs     01/23/23  0930   WBC 8.4   HGB 13.7   HCT 43.2        Recent Labs     01/23/23  0930      K 4.0      CO2 19*   BUN 19   CREATININE 1.0   CALCIUM 8.7     Recent Labs     01/23/23  0930   AST 27   ALT 34   BILIDIR <0.2   BILITOT 0.8   ALKPHOS 153*     No results for input(s): INR in the last 72 hours.   No results for input(s): Saida Jalloh in the last 72 hours. Urinalysis:   Lab Results   Component Value Date/Time    NITRU NEGATIVE 01/23/2023 12:45 PM    WBCUA 0-2 01/23/2023 12:45 PM    BACTERIA FEW 01/23/2023 12:45 PM    RBCUA 0-2 01/23/2023 12:45 PM    BLOODU TRACE 01/23/2023 12:45 PM    GLUCOSEU NEGATIVE 01/23/2023 12:45 PM       EKG: atrial fibrillation with RVR, ventricular rate 147    Radiology:  XR CHEST PORTABLE   Final Result   Patchy infiltrate left lower lobe. Subsegmental atelectasis right base. Silhouetting left hemidiaphragm suggesting possible pleural effusion. Cardiomegaly            **This report has been created using voice recognition software. It may contain minor errors which are inherent in voice recognition technology. **      Final report electronically signed by Dr. Dre Santiago on 1/23/2023 10:07 AM        XR CHEST PORTABLE    Result Date: 1/23/2023  PROCEDURE: XR CHEST PORTABLE CLINICAL INFORMATION: Chest pain palpitations . TECHNIQUE: Portable upright COMPARISON: 10/29/2022 FINDINGS: Expiratory view. Cardiomegaly. Silhouetting of the left hemidiaphragm. Focal opacity superior segment left lower lobe. Plate atelectasis right base. Vessels are not congested. EKG leads overlie the chest.     Patchy infiltrate left lower lobe. Subsegmental atelectasis right base. Silhouetting left hemidiaphragm suggesting possible pleural effusion. Cardiomegaly **This report has been created using voice recognition software. It may contain minor errors which are inherent in voice recognition technology. ** Final report electronically signed by Dr. Dre Santiago on 1/23/2023 10:07 AM        Tele:   [x] yes             [] no      Thank you Alva Bee MD for the opportunity to be involved in this patient's care.     Electronically signed by Georgie Poole PA-C on 1/23/2023 at 2:04 PM

## 2023-01-23 NOTE — ED NOTES
ED to inpatient nurses report    Chief Complaint   Patient presents with    Atrial Fibrillation    Shortness of Breath      Present to ED from home  LOC: alert and orientated to name, place, date  Vital signs   Vitals:    01/23/23 1125 01/23/23 1232 01/23/23 1239 01/23/23 1302   BP: 106/77 (!) 113/98  (!) 115/90   Pulse: (!) 109 (!) 108 (!) 130 (!) 126   Resp: 21 21 24 22   Temp:       TempSrc:       SpO2: 96% 95% 97% 97%   Weight:       Height:          Oxygen Baseline none    Current needs required none Bipap/Cpap No  LDAs:   Peripheral IV 01/23/23 Right Forearm (Active)   Site Assessment Clean, dry & intact 01/23/23 1302   Line Status Flushed;Normal saline locked 01/23/23 1302   Dressing Status Clean, dry & intact 01/23/23 1302   Dressing Type Transparent 01/23/23 1041   Dressing Intervention New 01/23/23 0926     Mobility: Requires assistance * 1  Pending ED orders: amiodarone - first bag of amio currently infusing   Present condition: stable    C-SSRS Risk of Suicide: No Risk  Swallow Screening    Preferred Language: [place]     Electronically signed by Sascha Fish RN on 1/23/2023 at 21 Sissy Ramirez RN  01/23/23 [address] [place] [address]  01/23/23 0800

## 2023-01-23 NOTE — ED NOTES
ED nurse-to-nurse bedside report    Chief Complaint   Patient presents with    Atrial Fibrillation    Shortness of Breath      LOC: alert and orientated to name, place, date  Vital signs   Vitals:    01/23/23 1125 01/23/23 1232 01/23/23 1239 01/23/23 1302   BP: 106/77 (!) 113/98  (!) 115/90   Pulse: (!) 109 (!) 108 (!) 130 (!) 126   Resp: 21 21 24 22   Temp:       TempSrc:       SpO2: 96% 95% 97% 97%   Weight:       Height:          Pain:    Pain Interventions: no pain  Pain Goal: no pain  Oxygen: No    Current needs required none   Telemetry: Yes  LDAs:   Peripheral IV 01/23/23 Right Forearm (Active)   Site Assessment Clean, dry & intact 01/23/23 1302   Line Status Flushed;Normal saline locked 01/23/23 1302   Dressing Status Clean, dry & intact 01/23/23 1302   Dressing Type Transparent 01/23/23 1041   Dressing Intervention New 01/23/23 0926     Continuous Infusions:   Mobility: Independent  Dao Fall Risk Score:    Fall Risk 12/27/2022 12/20/2021 6/21/2021 12/21/2020 6/24/2020 12/23/2019 1/22/2018   2 or more falls in past year? no no no no no no no   Fall with injury in past year? no no no no no no no     Fall Interventions: bed in lowest position, call light in reach  Report given to: Jeannette Gracia RN, [address] [place] [address], RN  01/23/23 3654

## 2023-01-23 NOTE — ED PROVIDER NOTES
[address] [facility] [address] [address] EMERGENCY DEPT      EMERGENCY MEDICINE     Pt Name: Nieves Graves  MRN: [id number]  [place] 1945  Date of evaluation: 1/23/2023  Provider: Leobardo Gaming MD    CHIEF COMPLAINT       Chief Complaint   Patient presents with    Atrial Fibrillation    Shortness of Breath     HISTORY OF PRESENT ILLNESS   Nieves Graves is a pleasant 69 y.o. female who presents to the emergency department from from home via private vehicle. Patient chief complaint is worsening shortness of breath. Patient says that she has had worsening shortness of breath over the last several weeks and was recently seen by her cardiologist who started her on amiodarone orally for worsening A. fib. Patient does endorse shortness of breath when lying flat and says that she sleeps with multiple pillows in order to maintain upright position in order to be comfortable. Patient does occasionally wake up starving for air. Patient denies any cough congestion fever chills runny nose diarrhea constipation or dysuria. Patient says that she has had A. fib for about 20 years and has been on Xarelto ever since. Patient denies any history of heart attacks or heart failure. Patient says she did have a cardiac catheterization years ago which was clean. Patient says she is scheduled for an ablation surgery on February 1.     Review of Systems  Negative except for as documented above    PASTMEDICAL HISTORY     Past Medical History:   Diagnosis Date    Adenomatous colon polyp 2006    Coronary artery disease involving native coronary artery of native heart without angina pectoris 5/6/2020    Fibrocystic breast disease 1990    Hyperlipidemia 1990    MVP (mitral valve prolapse) 1997    Osteopenia 02/2018    PONV (postoperative nausea and vomiting)     Vitamin D deficiency 03/2018       Patient Active Problem List   Diagnosis Code    Pure hypercholesterolemia E78.00    Adenomatous polyp of colon D12.6    Vitamin D deficiency E55.9 Osteopenia M85.80    Palpitations R00.2    Paroxysmal atrial fibrillation (HCC) I48.0    History of cardiac catheterization Z98.890    BPV (benign positional vertigo), right H81.11    Dyslipidemia E78.5    Coronary artery disease involving native coronary artery of native heart without angina pectoris I25.10    Postmenopausal state Z78.0    SOB (shortness of breath) on exertion R06.02    S/P cardiac cath 2018 - nonobstructive lesion Z98.890    Atrial fibrillation with rapid ventricular response ([address] I48.91    Atrial fibrillation with RVR ([address] I48.91     SURGICAL HISTORY       Past Surgical History:   Procedure Laterality Date    COLONOSCOPY  2011    Wisser    COLONOSCOPY  2006    Wisser    COLONOSCOPY  01/25/2017    Dr. Julieta Swartz (CERVIX STATUS UNKNOWN)  1991    SHAMA AND BSO (CERVIX REMOVED)  [address]       Current Discharge Medication List        CONTINUE these medications which have NOT CHANGED    Details   metoprolol tartrate (LOPRESSOR) 50 MG tablet Take 1 tablet by mouth 2 times daily  Qty: 60 tablet, Refills: 3      !! amiodarone (PACERONE) 400 MG tablet Take 1 tablet by mouth 2 times daily  Qty: 60 tablet, Refills: 0      apixaban (ELIQUIS) 5 MG TABS tablet TAKE 1 TABLET TWICE A DAY  Qty: 60 tablet, Refills: 0      sertraline (ZOLOFT) 25 MG tablet Take 1 tablet by mouth daily  Qty: 90 tablet, Refills: 3    Associated Diagnoses: Anxiety disorder, unspecified type      rosuvastatin (CRESTOR) 20 MG tablet TAKE 1 TABLET DAILY  Qty: 90 tablet, Refills: 3    Associated Diagnoses: Pure hypercholesterolemia      Calcium Carbonate (CALCIUM 600 PO) Take by mouth      Multiple Vitamins-Minerals (THERAPEUTIC MULTIVITAMIN-MINERALS) tablet Take 1 tablet by mouth daily      betamethasone valerate (VALISONE) 0.1 % cream Apply topically 2 times daily.   Qty: 15 g, Refills: 0    Associated Diagnoses: Irritant contact dermatitis due to plants, except food Cholecalciferol (VITAMIN D3) 5000 units CAPS Take 1 capsule by mouth Daily    Associated Diagnoses: Vitamin D deficiency       ! ! - Potential duplicate medications found. Please discuss with provider. ALLERGIES     has No Known Allergies. FAMILY HISTORY     She indicated that her mother is . She indicated that her father is . She indicated that her sister is . She indicated that the status of her maternal aunt is unknown. SOCIAL HISTORY       Social History     Tobacco Use    Smoking status: Former     Packs/day: 0.50     Years: 4.00     Pack years: 2.00     Types: Cigarettes     Quit date:      Years since quittin.0    Smokeless tobacco: Never   Vaping Use    Vaping Use: Never used   Substance Use Topics    Alcohol use: Yes     Comment: occsional 2-3 times a month    Drug use: No       PHYSICAL EXAM       ED Triage Vitals [23 0910]   BP Temp Temp Source Heart Rate Resp SpO2 Height Weight   (!) 134/115 97.5 °F (36.4 °C) Oral (!) 152 20 97 % 5' 2\" (1.575 m) 168 lb (76.2 kg)       Physical Exam  General: Lying in bed no apparent distress  HEENT:  normocephalic and atraumatic. No scleral icterus. PERR. EOMI  Neck: Supple. No JVD. No thyromegaly. Lungs: clear to auscultation. No retractions, No evidence of Respiratory Distress   Cardiac:  Irregularly irregular rhythm, tachycardic, bilateral radial and DP pulses 2+,  Abdomen: soft. Nontender. Nondistended, Bowel Sounds Present  Extremities:   2+ pitting edema bilateral lower extremities, no evidence of cyanosis, no clubbing,     Skin:  warm and dry. No rashes or bruises  Psych:  Alert and oriented x3. Affect appropriate  Lymph:  No supraclavicular adenopathy. Neurologic:  No focal deficit. No seizures.     FORMAL DIAGNOSTIC RESULTS     RADIOLOGY: Interpretation per the Radiologist below, if available at the time of this note (none if blank):    XR CHEST PORTABLE   Final Result   Patchy infiltrate left lower lobe. Subsegmental atelectasis right base. Silhouetting left hemidiaphragm suggesting possible pleural effusion. Cardiomegaly            **This report has been created using voice recognition software. It may contain minor errors which are inherent in voice recognition technology. **      Final report electronically signed by Dr. Anh Kevin on 1/23/2023 10:07 AM          LABS: (none if blank)  Labs Reviewed   CBC WITH AUTO DIFFERENTIAL - Abnormal; Notable for the following components:       Result Value    MCHC 31.7 (*)     RDW-CV 14.9 (*)     RDW-SD 46.3 (*)     All other components within normal limits   BRAIN NATRIURETIC PEPTIDE - Abnormal; Notable for the following components:    Pro-BNP 5960.0 (*)     All other components within normal limits   BASIC METABOLIC PANEL - Abnormal; Notable for the following components:    CO2 19 (*)     All other components within normal limits   HEPATIC FUNCTION PANEL - Abnormal; Notable for the following components:    Alkaline Phosphatase 153 (*)     Total Protein 5.9 (*)     All other components within normal limits   TSH - Abnormal; Notable for the following components:    TSH 7.310 (*)     All other components within normal limits   GLOMERULAR FILTRATION RATE, ESTIMATED - Abnormal; Notable for the following components:    Est, Glom Filt Rate 58 (*)     All other components within normal limits   URINE WITH REFLEXED MICRO - Abnormal; Notable for the following components:    Ketones, Urine TRACE (*)     Blood, Urine TRACE (*)     All other components within normal limits   LIPASE   TROPONIN   MAGNESIUM   ETHANOL   URINE DRUG SCREEN   ANION GAP   OSMOLALITY   T4, FREE       (Any cultures that may have been sent were not resulted at the time of this patient visit)    [address] / ED COURSE:     1) Number and Complexity of Problems            Problem List This Visit:         Chief Complaint   Patient presents with    Atrial Fibrillation    Shortness of Breath            Differential Diagnosis includes (but not limited to):  A. fib with RVR secondary to other causes including new onset heart failure, COVID, flu, pneumonia, urinary tract infection, dehydration, volume overload, pulmonary embolism        Diagnoses Considered but I have low suspicion of:                Pertinent Comorbid Conditions:    A. fib    2)  Data Reviewed (none if left blank)          My Independent interpretations:     EKG:      A. fib with RVR with a rate of 146    Imaging: See Ed Course    Labs:      See Ed Course                 Decision Rules/Clinical Scores utilized:              External Documentation Reviewed:         Previous patient encounter documents & history available on EMR was reviewed              See Formal Diagnostic Results above for the lab and radiology tests and orders. 3)  Treatment and Disposition         ED Reassessment: Upon reevaluation, patient still had persistent tachycardia despite Cardizem. Because the patient's blood pressure became soft at 121 systolic decision was made to give the second bolus as amiodarone as the patient is already on that medication. Will reevaluate. Please see ED course for further treatment discussion, MDM and final disposition.          Case discussed with consulting clinician:           Shared Decision-Making was performed and disposition discussed with the        Patient/Family and questions answered          Social determinants of health impacting treatment or disposition:           Code Status: Full code      Summary of Patient Presentation:      MDM  Number of Diagnoses or Management Options  Atrial fibrillation with rapid ventricular response ([address]): new, needed workup  Elevated brain natriuretic peptide (BNP) level: new, needed workup  Longstanding persistent atrial fibrillation ([address]): new, needed workup     Amount and/or Complexity of Data Reviewed  Clinical lab tests: ordered and reviewed  Tests in the radiology section of CPT®: ordered and reviewed  Discussion of test results with the performing providers: no  Decide to obtain previous medical records or to obtain history from someone other than the patient: yes  Obtain history from someone other than the patient: yes  Review and summarize past medical records: yes  Discuss the patient with other providers: yes  Independent visualization of images, tracings, or specimens: yes    Risk of Complications, Morbidity, and/or Mortality  Presenting problems: moderate  Diagnostic procedures: moderate  Management options: moderate    Patient Progress  Patient progress: improved   /   ED Course as of 01/23/23 1610   Mon Jan 23, 2023   1136 Heart Rate(!): 106 [NARDA]   1217 Patient's urine sample still pending at this time. Electrolytes are within normal limits TSH mildly elevated at 7.3 this is insignificant. Elevation of the proBNP of 5960 consistent with her pitting edema and onset of heart failure. Patient's prior echo did demonstrate ejection fraction of 50% which was borderline. [NARDA]   1323 The 120s and 130s despite the amiodarone bolus. Shared decision-making was performed and patient agrees is in her best interest to be started on a drip to be rate control and admitted in the hospital to be seen by cardiology. Amiodarone drip was ordered. Contact the hospitalist regarding this patient for admit. Orders pending at this time. [NARDA]      ED Course User Index  [NARDA] Dilan Constantino MD     Vitals Reviewed:    Vitals:    01/23/23 1239 01/23/23 1302 01/23/23 1332 01/23/23 1427   BP:  (!) 115/90 106/78 (!) 111/94   Pulse: (!) 130 (!) 126 (!) 117 (!) 126   Resp: 24 22 19 22   Temp:       TempSrc:       SpO2: 97% 97% 96% 97%   Weight:       Height:           The patient was seen and examined. Appropriate diagnostic testing was performed and results reviewed with the patient. The results of pertinent diagnostic studies and exam findings were discussed.  The patients provisional diagnosis and plan of care were discussed with the patient and present family who expressed understanding. Any medications were reviewed and indications and risks of medications were discussed with the patient /family present. Strict verbal and written return precautions, instructions and appropriate follow-up provided to  the patient . ED Medications administered this visit:  (None if blank)  Medications   amiodarone (NEXTERONE) 360 mg in dextrose 5% 200 ml (1 mg/min IntraVENous New Bag 1/23/23 1435)     Followed by   amiodarone (NEXTERONE) 360 mg in dextrose 5% 200 ml (has no administration in time range)   dilTIAZem injection 12.5 mg (12.5 mg IntraVENous Given 1/23/23 1035)   amiodarone (NEXTERONE) 150 mg in dextrose 5% 100 ml (0 mg IntraVENous Stopped 1/23/23 1256)         PROCEDURES: (None if blank)  Procedures:     CRITICAL CARE: (Please see Attending note / Hammad Ocasio regarding Critical Care Time. )      DISCHARGE PRESCRIPTIONS: (None if blank)  Current Discharge Medication List        START taking these medications    Details   !! amiodarone (CORDARONE) 200 MG tablet TAKE 2 TABLETS BY MOUTH TWICE DAILY  Qty: 360 tablet, Refills: 0    Comments: **Patient requests 90 days supply**      !! amiodarone (PACERONE) 100 MG tablet TAKE 1 TABLET BY MOUTH TWICE DAILY  Qty: 180 tablet, Refills: 3    Comments: **Patient requests 90 days supply**       !! - Potential duplicate medications found. Please discuss with provider. FINAL IMPRESSION      1. Longstanding persistent atrial fibrillation ([address]    2. Atrial fibrillation with rapid ventricular response (HCC)    3. Elevated brain natriuretic peptide (BNP) level          DISPOSITION/PLAN   DISPOSITION Admitted 01/23/2023 02:32:13 PM      OUTPATIENT FOLLOW UP THE PATIENT:  No follow-up provider specified. Amber Tsang MD    This transcription was electronically signed.  Parts of this transcriptions may have been dictated by use of voice recognition software and electronically transcribed, and parts may have been transcribed with the assistance of an ED scribe. The transcription may contain errors not detected in proofreading. Please refer to my supervising physician's documentation if my documentation differs.     Electronically Signed: Princess Encinas MD, 01/23/23, 4:10 PM       Anna Tam MD  Resident  01/23/23 6708

## 2023-01-23 NOTE — ED NOTES
Pt has been experiencing some SOB for the past month. She was seen by her pcp who told her she needed to gradually start working out. Pt was seen by cardiology on Friday who told her she was in Afib. Her metoprolol was increased from 25 BID to 50 BID and she was placed on 400mg amiodarone. Pt states her sob has not gotten any better this weekend. Denies any chest pain. States she does currently take a blood thinner of which she has been taking for years. She is unsure why she is taking this though. EKG completed upon arrival. INT established. Pt placed on monitor and telemetry. Daughter at bedside. Call light in reach.      Amarjit Euceda RN  01/23/23 0515

## 2023-01-23 NOTE — ED NOTES
Pt up to restroom at this time. Ambulatory without assistance. Tolerated well.      Jorge L Teran, ONUR  01/23/23 4455

## 2023-01-23 NOTE — ED NOTES
Pt transported to 8B by cart in stable condition. Called [facility]B and informed them that the patient was on their way to the unit.       Estefania Dickson, TROY  67/19/92 5041

## 2023-01-24 PROBLEM — I48.11 LONGSTANDING PERSISTENT ATRIAL FIBRILLATION (HCC): Status: ACTIVE | Noted: 2023-01-24

## 2023-01-24 LAB
ANION GAP SERPL CALC-SCNC: 15 MEQ/L (ref 8–16)
BUN SERPL-MCNC: 20 MG/DL (ref 7–22)
CALCIUM SERPL-MCNC: 8.9 MG/DL (ref 8.5–10.5)
CHLORIDE SERPL-SCNC: 103 MEQ/L (ref 98–111)
CO2 SERPL-SCNC: 22 MEQ/L (ref 23–33)
CREAT SERPL-MCNC: 1.3 MG/DL (ref 0.4–1.2)
DEPRECATED RDW RBC AUTO: 47.7 FL (ref 35–45)
ERYTHROCYTE [DISTWIDTH] IN BLOOD BY AUTOMATED COUNT: 15.2 % (ref 11.5–14.5)
GFR SERPL CREATININE-BSD FRML MDRD: 43 ML/MIN/1.73M2
GLUCOSE SERPL-MCNC: 118 MG/DL (ref 70–108)
HCT VFR BLD AUTO: 43.5 % (ref 37–47)
HGB BLD-MCNC: 13.5 GM/DL (ref 12–16)
MCH RBC QN AUTO: 26.8 PG (ref 26–33)
MCHC RBC AUTO-ENTMCNC: 31 GM/DL (ref 32.2–35.5)
MCV RBC AUTO: 86.5 FL (ref 81–99)
PLATELET # BLD AUTO: 303 THOU/MM3 (ref 130–400)
PMV BLD AUTO: 10.4 FL (ref 9.4–12.4)
POTASSIUM SERPL-SCNC: 4.6 MEQ/L (ref 3.5–5.2)
RBC # BLD AUTO: 5.03 MILL/MM3 (ref 4.2–5.4)
SODIUM SERPL-SCNC: 140 MEQ/L (ref 135–145)
WBC # BLD AUTO: 8.3 THOU/MM3 (ref 4.8–10.8)

## 2023-01-24 PROCEDURE — 85027 COMPLETE CBC AUTOMATED: CPT

## 2023-01-24 PROCEDURE — 6370000000 HC RX 637 (ALT 250 FOR IP)

## 2023-01-24 PROCEDURE — 93005 ELECTROCARDIOGRAM TRACING: CPT | Performed by: NURSE PRACTITIONER

## 2023-01-24 PROCEDURE — 80048 BASIC METABOLIC PNL TOTAL CA: CPT

## 2023-01-24 PROCEDURE — 99223 1ST HOSP IP/OBS HIGH 75: CPT | Performed by: INTERNAL MEDICINE

## 2023-01-24 PROCEDURE — 1200000003 HC TELEMETRY R&B

## 2023-01-24 PROCEDURE — 93005 ELECTROCARDIOGRAM TRACING: CPT | Performed by: INTERNAL MEDICINE

## 2023-01-24 PROCEDURE — 2500000003 HC RX 250 WO HCPCS

## 2023-01-24 PROCEDURE — 1200000000 HC SEMI PRIVATE

## 2023-01-24 PROCEDURE — 36415 COLL VENOUS BLD VENIPUNCTURE: CPT

## 2023-01-24 RX ADMIN — ROSUVASTATIN CALCIUM 20 MG: 20 TABLET, FILM COATED ORAL at 20:18

## 2023-01-24 RX ADMIN — APIXABAN 5 MG: 5 TABLET, FILM COATED ORAL at 09:01

## 2023-01-24 RX ADMIN — AMIODARONE HYDROCHLORIDE 0.5 MG/MIN: 1.8 INJECTION, SOLUTION INTRAVENOUS at 10:49

## 2023-01-24 RX ADMIN — AMIODARONE HYDROCHLORIDE 0.5 MG/MIN: 1.8 INJECTION, SOLUTION INTRAVENOUS at 22:36

## 2023-01-24 RX ADMIN — SERTRALINE 25 MG: 25 TABLET, FILM COATED ORAL at 09:01

## 2023-01-24 RX ADMIN — Medication 1 TABLET: at 09:01

## 2023-01-24 RX ADMIN — METOPROLOL TARTRATE 50 MG: 50 TABLET, FILM COATED ORAL at 06:37

## 2023-01-24 RX ADMIN — APIXABAN 5 MG: 5 TABLET, FILM COATED ORAL at 20:18

## 2023-01-24 RX ADMIN — METOPROLOL TARTRATE 50 MG: 50 TABLET, FILM COATED ORAL at 20:18

## 2023-01-24 RX ADMIN — ACETAMINOPHEN 650 MG: 325 TABLET ORAL at 22:31

## 2023-01-24 ASSESSMENT — PAIN SCALES - GENERAL
PAINLEVEL_OUTOF10: 0
PAINLEVEL_OUTOF10: 3
PAINLEVEL_OUTOF10: 0
PAINLEVEL_OUTOF10: 0
PAINLEVEL_OUTOF10: 3
PAINLEVEL_OUTOF10: 0

## 2023-01-24 ASSESSMENT — PAIN DESCRIPTION - PAIN TYPE: TYPE: ACUTE PAIN

## 2023-01-24 ASSESSMENT — PAIN DESCRIPTION - FREQUENCY: FREQUENCY: INTERMITTENT

## 2023-01-24 ASSESSMENT — PAIN DESCRIPTION - LOCATION: LOCATION: ABDOMEN

## 2023-01-24 ASSESSMENT — PAIN DESCRIPTION - DESCRIPTORS: DESCRIPTORS: ACHING

## 2023-01-24 ASSESSMENT — PAIN DESCRIPTION - ORIENTATION: ORIENTATION: MID

## 2023-01-24 ASSESSMENT — PAIN - FUNCTIONAL ASSESSMENT: PAIN_FUNCTIONAL_ASSESSMENT: ACTIVITIES ARE NOT PREVENTED

## 2023-01-24 ASSESSMENT — PAIN DESCRIPTION - ONSET: ONSET: ON-GOING

## 2023-01-24 NOTE — PROGRESS NOTES
Progress note      [facility]             Patient:  Kaleigh Nugent  YOB: 1945    MRN: [id number]   Acct:  [de-identified]   [id number]  Primary Care Physician: Lenore Meadows MD    Admit Date: 1/23/2023           Subjective: Pt in bed alert and in no acute distress. Pt getting cardioversion today. Pt denies any chest pain or heart palpitations but does have SOB. Objective:      Physical Exam:    Vitals:Patient Vitals for the past 24 hrs:   BP Temp Temp src Pulse Resp SpO2   01/24/23 0845 (!) 110/99 97.8 °F (36.6 °C) Axillary (!) 119 22 98 %   01/24/23 0300 122/85 98.2 °F (36.8 °C) Oral 85 20 99 %   01/24/23 0006 129/69 98.3 °F (36.8 °C) Oral 89 20 98 %   01/23/23 2030 (!) 122/99 -- -- (!) 138 -- --   01/23/23 1945 107/73 97.8 °F (36.6 °C) Oral (!) 127 20 97 %   01/23/23 1653 (!) 119/102 -- -- -- -- --   01/23/23 1427 (!) 111/94 97.7 °F (36.5 °C) Oral (!) 126 22 97 %   01/23/23 1332 106/78 -- -- (!) 117 19 96 %   01/23/23 1302 (!) 115/90 -- -- (!) 126 22 97 %   01/23/23 1239 -- -- -- (!) 130 24 97 %   01/23/23 1232 (!) 113/98 -- -- (!) 108 21 95 %   01/23/23 1125 106/77 -- -- (!) 109 21 96 %     Weight: Weight: 168 lb (76.2 kg)     24 hour intake/output:  Intake/Output Summary (Last 24 hours) at 1/24/2023 1043  Last data filed at 1/24/2023 0300  Gross per 24 hour   Intake 712 ml   Output --   Net 712 ml       General appearance - alert, well appearing, and in no distress  Eyes - pupils equal and reactive, extraocular eye movements intact  Mouth - mucous membranes moist, pharynx normal without lesions  Neck - supple, no significant adenopathy  Chest - clear to auscultation, no wheezes, rales or rhonchi, symmetric air entry  Heart - normal rate, irregular rhythm  Abdomen - soft, nontender, nondistended, no masses or organomegaly, pos bs.   Neurological - alert, oriented, normal speech, no focal findings or movement disorder noted  Musculoskeletal - no joint tenderness, deformity or swelling  Extremities - peripheral pulses normal, +edema  Skin - normal coloration and turgor, no rashes, no suspicious skin lesions noted    Review of Labs and Diagnostic Testing:    CBC:   Recent Labs     01/24/23  0954   WBC 8.3   HGB 13.5   HCT 43.5   MCV 86.5        BMP:   Recent Labs     01/24/23  0954      K 4.6      CO2 22*   BUN 20   CREATININE 1.3*   CALCIUM 8.9   GLUCOSE 118*     PT/INR: No results for input(s): PROTIME, INR in the last 72 hours. APTT: No results for input(s): APTT in the last 72 hours.    Lipids:   Recent Labs     01/23/23  0930   ALKPHOS 153*   ALT 34   AST 27   BILITOT 0.8   BILIDIR <0.2   LABALBU 3.6   LIPASE 26.7     Troponin:   Recent Labs     01/23/23  0930   TROPONINT < 0.010        Imaging:  [unfilled]    EKG:      Diet: Diet NPO        Data:   Scheduled Meds: Scheduled Meds:   [Held by provider] amiodarone  400 mg Oral BID    apixaban  5 mg Oral BID    metoprolol tartrate  50 mg Oral BID    rosuvastatin  20 mg Oral Nightly    sertraline  25 mg Oral Daily    multivitamin  1 tablet Oral Daily    sodium chloride flush  5-40 mL IntraVENous 2 times per day     Continuous Infusions:   amiodarone 0.5 mg/min (01/23/23 1952)    sodium chloride       PRN Meds:.calcium carbonate, sodium chloride flush, sodium chloride, ondansetron **OR** ondansetron, polyethylene glycol, acetaminophen **OR** acetaminophen  Continuous Infusions:   amiodarone 0.5 mg/min (01/23/23 1952)    sodium chloride           Assessment   Atrial Fibrillation with RVR on OAC and rate control   Nonobstructive CAD   Hyperlipidemia   Depression   Obesity    Plan   Cardiology following  Cardioversion today  ECHO to be done  Continue telemetry  Pt on Eliquis    Electronically signed by FELICITAS Larkin CNP on 1/24/2023 at 10:43 AM    Assessment and plan of care discussed with supervising physician, Dr Alba Briscoe.    Pt seen and examined by me  F/w [address] are for  cardioversion  HR still in 110's  Received metoprolol earlier  Heart S1 S2 heard irregular and tachycardic  Cont amio    Electronically signed by Lyudmila Marquez MD on 1/24/2023 at 8:49 PM

## 2023-01-24 NOTE — PROGRESS NOTES
PT is NSR DIAMANTE canceled EKG performed. RN asked Cardiology PA Jeronimo Gonzalez if PT can eat and do we need to keep increased dose of Metoprolol. PT can eat and PA to review meds and HR.  Will continue to monitor

## 2023-01-24 NOTE — PROGRESS NOTES
Pt was in bed and alone at the time of the visit. He daughter will be coming soon, she said. She was dealing with A-Fib issues but was hoping to have a stent tday. She was encouraged and was anointed. 01/24/23 1407   Encounter Summary   Encounter Overview/Reason  Initial Encounter   Service Provided For: Patient   Referral/Consult From: [facility] Family members; Children   Last Encounter  01/24/23  (Anointed)   Complexity of Encounter Low   Begin Time 1000   End Time  1010   Total Time Calculated 10 min   Rituals, Rites and Sacraments   Type Anointing   Assessment/Intervention/Outcome   Assessment Calm   Intervention Empowerment

## 2023-01-24 NOTE — H&P
[address] [address]  Sedation/Analgesia History & Physical    Pt Name: Ivan Asif  Account number: [de-identified]  MRN: [id number]  YOB: 1945  Provider Performing Procedure: Lora Lorenzana MD MD  Referring Provider: Dominique Zayas MD   Primary Care Physician: Emiliano Hui MD  Date: 1/24/2023    PRE-PROCEDURE    Code Status: FULL CODE  Brief History/Pre-Procedure Diagnosis:   Afib RVR    Consent: : I have discussed with the patient risks, benefits, and alternatives (and relevant risks, benefits, and side effects related to alternatives or not receiving care), and likelihood of the success. The patient and/or representative appear to understand and agree to proceed. The discussion encompasses risks, benefits, and side effects related to the alternatives and the risks related to not receiving the proposed care, treatment, and services. The indication, risks and benefits of the procedure and possible therapeutic consequences and alternatives were discussed with the patient. The patient was given the opportunity to ask questions and to have them answered to his/her satisfaction. Risks of the procedure include but are not limited to the following: Bleeding, hematoma including retroperitoneal hemmorhage, infection, pain and discomfort, injury to the aorta and other blood vessels, rhythm disturbance, low blood pressure, myocardial infarction, stroke, kidney damage/failure, myocardial perforation, allergic reactions to sedatives/contrast material, loss of pulse/vascular compromise requiring surgery, aneurysm/pseudoaneurysm formation, possible loss of a limb/hand/leg, needing blood transfusion, requiring emergent open heart surgery or emergent coronary intervention, the need for intubation/respiratory support, the requirement for defibrillation/cardioversion, and death. Alternatives to and omission of the suggested procedure were discussed.  The patient had no further questions and wished to proceed; the consent form was signed. MEDICAL HISTORY   has a past medical history of Adenomatous colon polyp, Coronary artery disease involving native coronary artery of native heart without angina pectoris, Fibrocystic breast disease, Hyperlipidemia, MVP (mitral valve prolapse), Osteopenia, PONV (postoperative nausea and vomiting), and Vitamin D deficiency. SURGICAL HISTORY   has a past surgical history that includes Total abdominal hysterectomy w/ bilateral salpingoophorectomy (); Colonoscopy (); Colonoscopy (); Tonsillectomy (); Colonoscopy (2017); and Hysterectomy ().   Additional information:       ALLERGIES   Allergies as of 2023    (No Known Allergies)     Additional information:       MEDICATIONS     Current Facility-Administered Medications:     [] amiodarone (NEXTERONE) 360 mg in dextrose 5% 200 ml, 1 mg/min, IntraVENous, Continuous, Stopped at 23 **FOLLOWED BY** amiodarone (NEXTERONE) 360 mg in dextrose 5% 200 ml, 0.5 mg/min, IntraVENous, Continuous, Blanka Childt PA-C, Last Rate: 16.7 mL/hr at 23, 0.5 mg/min at 23    [Held by provider] amiodarone (CORDARONE) tablet 400 mg, 400 mg, Oral, BID, Blanka Barhorst, PA-C    apixaban (ELIQUIS) tablet 5 mg, 5 mg, Oral, BID, Blanka Barhorst, PA-C, 5 mg at 23    calcium carbonate (TUMS) chewable tablet 500 mg, 500 mg, Oral, BID PRN, Blankalobo Childt, PA-C    metoprolol tartrate (LOPRESSOR) tablet 50 mg, 50 mg, Oral, BID, Blanka Barhorst, PA-C, 50 mg at 23 6554    rosuvastatin (CRESTOR) tablet 20 mg, 20 mg, Oral, Nightly, Blanka Barhorst, PA-C, 20 mg at 23    sertraline (ZOLOFT) tablet 25 mg, 25 mg, Oral, Daily, Blanka Barhorst, PA-C, 25 mg at 23 0901    multivitamin 1 tablet, 1 tablet, Oral, Daily, Blanka Villatoro PA-C, 1 tablet at 23 0901    sodium chloride flush 0.9 % injection 5-40 mL, 5-40 mL, IntraVENous, 2 times per day, Blanka TOMÁS Villatoro    sodium chloride flush 0.9 % injection 5-40 mL, 5-40 mL, IntraVENous, PRN, Blanka Villatoro PA-C    0.9 % sodium chloride infusion, , IntraVENous, PRN, Blanka Villatoro PA-C    ondansetron (ZOFRAN-ODT) disintegrating tablet 4 mg, 4 mg, Oral, Q8H PRN **OR** ondansetron (ZOFRAN) injection 4 mg, 4 mg, IntraVENous, Q6H PRN, Blanka Villatoro PA-C    polyethylene glycol (GLYCOLAX) packet 17 g, 17 g, Oral, Daily PRN, Blanka Villatoro PA-C    acetaminophen (TYLENOL) tablet 650 mg, 650 mg, Oral, Q6H PRN **OR** acetaminophen (TYLENOL) suppository 650 mg, 650 mg, Rectal, Q6H PRN, Rosa Maria Matson PA-C  Prior to Admission medications    Medication Sig Start Date End Date Taking? Authorizing Provider   amiodarone (CORDARONE) 200 MG tablet TAKE 2 TABLETS BY MOUTH TWICE DAILY 1/23/23   FELICITAS Asif CNP   amiodarone (PACERONE) 100 MG tablet TAKE 1 TABLET BY MOUTH TWICE DAILY 1/23/23   FELICITAS Asif CNP   metoprolol tartrate (LOPRESSOR) 50 MG tablet Take 1 tablet by mouth 2 times daily 1/20/23   Lenny Kong MD   amiodarone (PACERONE) 400 MG tablet Take 1 tablet by mouth 2 times daily 1/20/23   Lenny Kong MD   apixaban (ELIQUIS) 5 MG TABS tablet TAKE 1 TABLET TWICE A DAY 1/20/23   Lenny Kong MD   sertraline (ZOLOFT) 25 MG tablet Take 1 tablet by mouth daily 11/28/22   Lesly Zhong MD   rosuvastatin (CRESTOR) 20 MG tablet TAKE 1 TABLET DAILY 11/10/22   Lenny Kong MD   Calcium Carbonate (CALCIUM 600 PO) Take by mouth    Historical Provider, MD   Multiple Vitamins-Minerals (THERAPEUTIC MULTIVITAMIN-MINERALS) tablet Take 1 tablet by mouth daily    Historical Provider, MD   betamethasone valerate (VALISONE) 0.1 % cream Apply topically 2 times daily.  6/10/22   Damaris Jalloh, APRN - CNP   Cholecalciferol (VITAMIN D3) 5000 units CAPS Take 1 capsule by mouth Daily  Patient taking differently: Take 1 capsule by mouth See Admin Instructions 3 times a week 3/8/18   Lit Miller Nissa Alcantara MD     Additional information:       VITAL SIGNS   Vitals:    01/24/23 0845   BP: (!) 110/99   Pulse: (!) 119   Resp: 22   Temp: 97.8 °F (36.6 °C)   SpO2: 98%       PHYSICAL:   General: No acute distress  HEENT:  Unremarkable for age  Neck: without increased JVD, carotid pulses 2+ bilaterally without bruits  Heart: Irreg-irreg, S1 & S2 WNL, S4 gallop, without murmurs or rubs   NYHA: 2  Lungs: Clear to auscultation    Abdomen: BS present, without HSM, masses, or tenderness    Extremities: without C,C,E.  Pulses 2+ bilaterally  Mental Status: Alert & Oriented        PLANNED PROCEDURE   []Cath  []PCI                []Pacemaker/AICD  [x]DIAMANTE             [x]Cardioversion []Peripheral angiography/PTA  []Other:      SEDATION  Planned agent:[x]Midazolam []Meperidine [x]Sublimaze []Morphine  []Diazepam  []Other:     ASA Classification:  []1 []2 [x]3 []4 []5  Class 1: A normal healthy patient  Class 2: Pt with mild to moderate systemic disease  Class 3: Severe systemic disease or disturbance  Class 4: Severe systemic disorders that are already life threatening. Class 5: Moribund pt with little chances of survival, for more than 24 hours. Mallampati I Airway Classification:   []1 []2 [x]3 []4    [x]Pre-procedure diagnostic studies complete and results available. Comment:    [x]Previous sedation/anesthesia experiences assessed. Comment:    [x]The patient is an appropriate candidate to undergo the planned procedure sedation and anesthesia. (Refer to nursing sedation/analgesia documentation record)  [x]Formulation and discussion of sedation/procedure plan, risks, and expectations with patient and/or responsible adult completed. [x]Patient examined immediately prior to the procedure.  (Refer to nursing sedation/analgesia documentation record)    Alpesh Eisenberg MD MD   Electronically signed 1/24/2023 at 9:51 AM

## 2023-01-24 NOTE — PLAN OF CARE
Problem: Discharge Planning  Goal: Discharge to home or other facility with appropriate resources  1/24/2023 1632 by Blaine Saini RN  Outcome: Progressing  1/24/2023 0435 by Evan Jaramillo RN  Outcome: Progressing  Flowsheets (Taken 1/23/2023 1945)  Discharge to home or other facility with appropriate resources: Identify barriers to discharge with patient and caregiver     Problem: Pain  Goal: Verbalizes/displays adequate comfort level or baseline comfort level  1/24/2023 1632 by Blaine Saini RN  Outcome: Progressing  1/24/2023 0435 by Evan Jaramillo RN  Outcome: Progressing     Problem: Safety - Adult  Goal: Free from fall injury  Outcome: Progressing

## 2023-01-24 NOTE — PLAN OF CARE
Problem: Discharge Planning  Goal: Discharge to home or other facility with appropriate resources  1/24/2023 0435 by Pamela Hancock RN  Outcome: Progressing  Flowsheets (Taken 1/23/2023 1945)  Discharge to home or other facility with appropriate resources: Identify barriers to discharge with patient and caregiver     Problem: Pain  Goal: Verbalizes/displays adequate comfort level or baseline comfort level  Outcome: Progressing   Patient educated on appropriate pain scale. Pain assess every 4 hours and appropriate intervention applied. Care plan reviewed with patient. Patient verbalize understanding of the plan of care and contribute to goal setting.

## 2023-01-24 NOTE — CARE COORDINATION
Case Management Assessment  Initial Evaluation    Date/Time of Evaluation: 1/24/2023 1:17 PM  Assessment Completed by: Angi Vera RN    If patient is discharged prior to next notation, then this note serves as note for discharge by case management.    Patient Name: Keke Lee                   YOB: 1945  Diagnosis: Atrial fibrillation with RVR (HCC) [I48.91]                   Date / Time: 1/23/2023  9:09 AM  Location: [address] [place] [address]     Patient Admission Status: Inpatient   Readmission Risk (Low < 19, Mod (19-27), High > 27): Readmission Risk Score: 9.1    Current PCP: Jermaine Cardoso MD  PCP verified by CM? Yes    Chart Reviewed: Yes      History Provided by: Patient  Patient Orientation: Alert and Oriented    Patient Cognition: Alert    Hospitalization in the last 30 days (Readmission):  No    If yes, Readmission Assessment in CM Navigator will be completed.    Advance Directives:      Code Status: Full Code   Patient's Primary Decision Maker is: Named in Scanned ACP Document      Discharge Planning:    Patient lives with: Alone Type of Home: House  Primary Care Giver: Self  Patient Support Systems include: Children, Family Members   Current Financial resources: Medicare  Current community resources: None  Current services prior to admission: None            Current DME:              Type of Home Care services:  None    ADLS  Prior functional level: Independent in ADLs/IADLs  Current functional level: Independent in ADLs/IADLs    Family can provide assistance at DC: Yes  Would you like Case Management to discuss the discharge plan with any other family members/significant others, and if so, who? Yes  Plans to Return to Present Housing: Yes  Other Identified Issues/Barriers to RETURNING to current housing: No  Potential Assistance needed at discharge: N/A            Potential DME:    Patient expects to discharge to: House  Plan for transportation at discharge:  Self    Financial    Payor: MEDICARE / Plan: MEDICARE PART A AND B / Product Type: *No Product type* /     Does insurance require precert for SNF: No    Potential assistance Purchasing Medications: No  Meds-to-Beds request: Yes      [address] [phone] - F [phone]  [facility] [address]  Phone: [phone] Fax: [phone]    [id number] - [address] [facility], [facility] [address]  Khadijah Mattan [address] [facility] [phone]  [address]  [facility]  Phone: [phone] Fax: [phone]      Notes:    Factors facilitating achievement of predicted outcomes: Family support, Cooperative, Pleasant, and Good insight into deficits    Barriers to discharge: Medical complications    Additional Case Management Notes: Admitted from ER with A-fib and SOB. Hx A-fib. On Xarelto. Ablation scheduled for Feb 1. BNP 5960. A-fib rvr on admit. Cardiology consulted. Echo. Amiodarone gtt. Procedure: No.    The Plan for Transition of Care is related to the following treatment goals of Atrial fibrillation with RVR ([address] [I48.91]    Patient Goals/Plan/Treatment Preferences: Met with pt today. Daughter is present. This dtr is pt Medical POA. Pt is independent at home. Family is very supportive. Pt drives, has a PCP and no issues getting her medication. No discharge needs anticipated at this time. Transportation/Food Security/Housekeeping Addressed: No issues identified.      Faizan Green RN  Case Management Department

## 2023-01-25 ENCOUNTER — APPOINTMENT (OUTPATIENT)
Dept: NON INVASIVE DIAGNOSTICS | Age: 78
DRG: 286 | End: 2023-01-25
Payer: MEDICARE

## 2023-01-25 LAB
EKG ATRIAL RATE: 63 BPM
EKG P AXIS: 73 DEGREES
EKG P-R INTERVAL: 158 MS
EKG Q-T INTERVAL: 260 MS
EKG Q-T INTERVAL: 360 MS
EKG Q-T INTERVAL: 476 MS
EKG QRS DURATION: 124 MS
EKG QRS DURATION: 72 MS
EKG QRS DURATION: 78 MS
EKG QTC CALCULATION (BAZETT): 416 MS
EKG QTC CALCULATION (BAZETT): 487 MS
EKG QTC CALCULATION (BAZETT): 489 MS
EKG R AXIS: 68 DEGREES
EKG R AXIS: 83 DEGREES
EKG R AXIS: 96 DEGREES
EKG T AXIS: -43 DEGREES
EKG T AXIS: 27 DEGREES
EKG T AXIS: 65 DEGREES
EKG VENTRICULAR RATE: 111 BPM
EKG VENTRICULAR RATE: 154 BPM
EKG VENTRICULAR RATE: 63 BPM
LV EF: 28 %
LVEF MODALITY: NORMAL

## 2023-01-25 PROCEDURE — 1200000003 HC TELEMETRY R&B

## 2023-01-25 PROCEDURE — 1200000000 HC SEMI PRIVATE

## 2023-01-25 PROCEDURE — 93010 ELECTROCARDIOGRAM REPORT: CPT | Performed by: INTERNAL MEDICINE

## 2023-01-25 PROCEDURE — 93306 TTE W/DOPPLER COMPLETE: CPT

## 2023-01-25 PROCEDURE — 6370000000 HC RX 637 (ALT 250 FOR IP)

## 2023-01-25 PROCEDURE — 78452 HT MUSCLE IMAGE SPECT MULT: CPT

## 2023-01-25 PROCEDURE — 6360000002 HC RX W HCPCS

## 2023-01-25 PROCEDURE — 93017 CV STRESS TEST TRACING ONLY: CPT

## 2023-01-25 PROCEDURE — A9500 TC99M SESTAMIBI: HCPCS | Performed by: INTERNAL MEDICINE

## 2023-01-25 PROCEDURE — 93017 CV STRESS TEST TRACING ONLY: CPT | Performed by: INTERNAL MEDICINE

## 2023-01-25 PROCEDURE — 2580000003 HC RX 258

## 2023-01-25 PROCEDURE — 3430000000 HC RX DIAGNOSTIC RADIOPHARMACEUTICAL: Performed by: INTERNAL MEDICINE

## 2023-01-25 PROCEDURE — 6370000000 HC RX 637 (ALT 250 FOR IP): Performed by: PHYSICIAN ASSISTANT

## 2023-01-25 PROCEDURE — 99232 SBSQ HOSP IP/OBS MODERATE 35: CPT | Performed by: PHYSICIAN ASSISTANT

## 2023-01-25 RX ORDER — AMIODARONE HYDROCHLORIDE 200 MG/1
200 TABLET ORAL 2 TIMES DAILY
Status: DISCONTINUED | OUTPATIENT
Start: 2023-01-25 | End: 2023-01-29 | Stop reason: HOSPADM

## 2023-01-25 RX ORDER — TECHNETIUM TC-99M SESTAMIBI 1 MG/10ML
30.8 INJECTION INTRAVENOUS
Status: COMPLETED | OUTPATIENT
Start: 2023-01-25 | End: 2023-01-25

## 2023-01-25 RX ORDER — AMIODARONE HYDROCHLORIDE 200 MG/1
200 TABLET ORAL DAILY
Status: DISCONTINUED | OUTPATIENT
Start: 2023-02-08 | End: 2023-01-29 | Stop reason: HOSPADM

## 2023-01-25 RX ORDER — TECHNETIUM TC-99M SESTAMIBI 1 MG/10ML
8.6 INJECTION INTRAVENOUS
Status: COMPLETED | OUTPATIENT
Start: 2023-01-25 | End: 2023-01-25

## 2023-01-25 RX ADMIN — Medication 8.6 MILLICURIE: at 09:05

## 2023-01-25 RX ADMIN — ROSUVASTATIN CALCIUM 20 MG: 20 TABLET, FILM COATED ORAL at 20:14

## 2023-01-25 RX ADMIN — SODIUM CHLORIDE, PRESERVATIVE FREE 10 ML: 5 INJECTION INTRAVENOUS at 20:15

## 2023-01-25 RX ADMIN — APIXABAN 5 MG: 5 TABLET, FILM COATED ORAL at 11:02

## 2023-01-25 RX ADMIN — METOPROLOL TARTRATE 50 MG: 50 TABLET, FILM COATED ORAL at 20:16

## 2023-01-25 RX ADMIN — SERTRALINE 25 MG: 25 TABLET, FILM COATED ORAL at 11:02

## 2023-01-25 RX ADMIN — AMIODARONE HYDROCHLORIDE 200 MG: 200 TABLET ORAL at 11:01

## 2023-01-25 RX ADMIN — METOPROLOL TARTRATE 50 MG: 50 TABLET, FILM COATED ORAL at 11:02

## 2023-01-25 RX ADMIN — ACETAMINOPHEN 650 MG: 325 TABLET ORAL at 19:03

## 2023-01-25 RX ADMIN — APIXABAN 5 MG: 5 TABLET, FILM COATED ORAL at 20:14

## 2023-01-25 RX ADMIN — Medication 1 TABLET: at 11:02

## 2023-01-25 RX ADMIN — Medication 30.8 MILLICURIE: at 09:55

## 2023-01-25 RX ADMIN — AMIODARONE HYDROCHLORIDE 200 MG: 200 TABLET ORAL at 20:14

## 2023-01-25 ASSESSMENT — PAIN DESCRIPTION - DESCRIPTORS: DESCRIPTORS: DULL

## 2023-01-25 ASSESSMENT — PAIN DESCRIPTION - ORIENTATION: ORIENTATION: MID

## 2023-01-25 ASSESSMENT — PAIN SCALES - GENERAL
PAINLEVEL_OUTOF10: 0
PAINLEVEL_OUTOF10: 0
PAINLEVEL_OUTOF10: 1
PAINLEVEL_OUTOF10: 0

## 2023-01-25 ASSESSMENT — PAIN DESCRIPTION - LOCATION: LOCATION: ABDOMEN

## 2023-01-25 ASSESSMENT — PAIN - FUNCTIONAL ASSESSMENT: PAIN_FUNCTIONAL_ASSESSMENT: ACTIVITIES ARE NOT PREVENTED

## 2023-01-25 NOTE — PLAN OF CARE
Problem: Discharge Planning  Goal: Discharge to home or other facility with appropriate resources  1/25/2023 0437 by Laura Simeon RN  Outcome: Progressing  Note: Pt will discharge home when appropriate. Problem: Pain  Goal: Verbalizes/displays adequate comfort level or baseline comfort level  1/25/2023 0437 by Laura Simeon RN  Outcome: Progressing  Note: Pt denies pain on my shift. Non pharmaceutical interventions like ice and repositioning offered before pain medications. Will continue to assess. Problem: Safety - Adult  Goal: Free from fall injury  1/25/2023 0437 by Laura Simeon RN  Outcome: Progressing  Note: Fall precaution in place. Bed alarm/chair alarm. Bed locked in lowest position. Fall band applied if applicable. Call light and overhead table within reach. Will continue to monitor. Pt verbalizes understanding of care plan. Pt contributes to goal settings.

## 2023-01-25 NOTE — CARE COORDINATION
1/25/23, 2:08 PM EST    DISCHARGE ON GOING EVALUATION    [facility] day: 2  Location: [address] [place] [address]-A Reason for admit: Atrial fibrillation with RVR ([address] [I48.91]   Procedure: No.  Barriers to Discharge: Amiodarone no po. On Eliquis. Presently in Sinus Rhythm. Stress Test and Echo today. PCP: Ezio Heller MD  Readmission Risk Score: 10.5%  Patient Goals/Plan/Treatment Preferences: Plans return home alone with good family support.

## 2023-01-25 NOTE — PROGRESS NOTES
Cardiology Progress Note      Patient:  Marciano Hilario  YOB: 1945  MRN: [id number]   Acct: [de-identified]  Admit Date:  1/23/2023  Primary Cardiologist: Loco Douglas MD    Note per dr naik \"CHIEF COMPLAINT: A fib with RVR, SOB        HPI: This is a pleasant 69 y.o. female presents with SOB that has been present for 1 month. Patient states that it is constant and is made worse with exertion. Patient was seen by Dr. Cyn Owen on Friday and had scheduled DIAMANTE/DCCV for February 1st for A fib with RVR. Patient states she was unable to wait that long. Patient denies any chest pain, palpitations. Patient states that she has not previously had palpitations with her A fib, and her only symptom with A fib is SOB. Patient states that at her appointment on Friday, Dr. Cyn Owen increased her Lopressor and increased her amiodarone. Patient states that this has had no affect on her SOB. Patient reports compliance with her medications. Patient denies any recent Echo after Echo in 2018. Patient denies any history of HTN. Patient denies any previous MI or CVA\"    Subjective (Events in last 24 hours): pt awake and alert. NAD. No cp.   Still with THORPE and at times sob at rest.  No edema or orthopnea  On RA  On amio gtt    Objective:   /76   Pulse (!) 102   Temp 98 °F (36.7 °C) (Oral)   Resp 20   Ht 5' 2\" (1.575 m)   Wt 168 lb (76.2 kg)   SpO2 99%   BMI 30.73 kg/m²        TELEMETRY: s.b. 46s on amio gtt    Physical Exam:  General Appearance: alert and oriented to person, place and time, in no acute distress  Cardiovascular: normal rate, regular rhythm, normal S1 and S2, no murmurs, rubs, clicks, or gallops, distal pulses intact, no carotid bruits, no JVD  Pulmonary/Chest: clear to auscultation bilaterally- no wheezes, rales or rhonchi, normal air movement, no respiratory distress  Abdomen: soft, non-tender, non-distended, normal bowel sounds, no masses Extremities: no cyanosis, clubbing or edema, pulse   Skin: warm and dry, no rash or erythema  Head: normocephalic and atraumatic  Eyes: pupils equal, round, and reactive to light  Neck: supple and non-tender without mass, no thyromegaly   Neurological: alert, oriented, normal speech, no focal findings or movement disorder noted    Medications:    [Held by provider] amiodarone  400 mg Oral BID    apixaban  5 mg Oral BID    metoprolol tartrate  50 mg Oral BID    rosuvastatin  20 mg Oral Nightly    sertraline  25 mg Oral Daily    multivitamin  1 tablet Oral Daily    sodium chloride flush  5-40 mL IntraVENous 2 times per day      amiodarone 0.5 mg/min (01/24/23 2236)    sodium chloride       calcium carbonate, 500 mg, BID PRN  sodium chloride flush, 5-40 mL, PRN  sodium chloride, , PRN  ondansetron, 4 mg, Q8H PRN   Or  ondansetron, 4 mg, Q6H PRN  polyethylene glycol, 17 g, Daily PRN  acetaminophen, 650 mg, Q6H PRN   Or  acetaminophen, 650 mg, Q6H PRN        Lab Data:    Cardiac Enzymes:  No results for input(s): CKTOTAL, CKMB, CKMBINDEX, TROPONINI in the last 72 hours.     CBC:   Lab Results   Component Value Date/Time    WBC 8.3 01/24/2023 09:54 AM    RBC 5.03 01/24/2023 09:54 AM    HGB 13.5 01/24/2023 09:54 AM    HCT 43.5 01/24/2023 09:54 AM     01/24/2023 09:54 AM       CMP:    Lab Results   Component Value Date/Time     01/24/2023 09:54 AM    K 4.6 01/24/2023 09:54 AM    K 3.9 05/09/2018 09:15 AM     01/24/2023 09:54 AM    CO2 22 01/24/2023 09:54 AM    BUN 20 01/24/2023 09:54 AM    CREATININE 1.3 01/24/2023 09:54 AM    LABGLOM 43 01/24/2023 09:54 AM    GLUCOSE 118 01/24/2023 09:54 AM    CALCIUM 8.9 01/24/2023 09:54 AM       Hepatic Function Panel:    Lab Results   Component Value Date/Time    ALKPHOS 153 01/23/2023 09:30 AM    ALT 34 01/23/2023 09:30 AM    AST 27 01/23/2023 09:30 AM    PROT 5.9 01/23/2023 09:30 AM    BILITOT 0.8 01/23/2023 09:30 AM    BILIDIR <0.2 01/23/2023 09:30 AM    LABALBU 3.6 01/23/2023 09:30 AM       Magnesium:    Lab Results Component Value Date/Time    MG 1.8 01/23/2023 09:30 AM       PT/INR:    Lab Results   Component Value Date/Time    INR 0.96 05/09/2018 09:14 AM       HgBA1c:  No results found for: LABA1C    FLP:    Lab Results   Component Value Date/Time    TRIG 169 11/10/2022 08:20 AM    HDL 49 11/10/2022 08:20 AM    LDLCALC 47 11/10/2022 08:20 AM    LDLDIRECT 168.41 12/10/2021 08:35 AM       TSH:    Lab Results   Component Value Date/Time    TSH 7.310 01/23/2023 09:30 AM         Assessment:    Paroxysmal Afib rvr - currently nsr with amio gtt   Chadsvasc = 3   On eliquis PTA  Nonobstructive CAD per cath 2018  Ef 50 per TTE 3/7/18  HTN  HLP      Plan:    Keep mag >2 and K >4  Abn TSH - attending to follow  Cont statin/BB  Cont amio - change to 200 po bid x 2 weeks then 200 daily  Cont eliquis  TTE today  Stress test today  npo    Electronically signed by Maddy Owusu PA-C on 1/25/2023 at 7:33 AM

## 2023-01-25 NOTE — PROGRESS NOTES
Progress note      [facility]             Patient:  Jose Francisco Izquierdo  YOB: 1945    MRN: [id number]   Acct:  [de-identified]   [id number]  Primary Care Physician: Patrick Becker MD    Admit Date: 1/23/2023           Subjective: Pt in bed in no acute distress. Cardioversion was not done, pt flipped back into NSR. Pt had stress done earlier and awaiting for TTE. Pt notices SOB with activity having no chest pain or heart palpitations or racing at this time. Objective:      Physical Exam:    Vitals:Patient Vitals for the past 24 hrs:   BP Temp Temp src Pulse Resp SpO2   01/25/23 1447 121/77 97.8 °F (36.6 °C) Oral 55 18 93 %   01/25/23 1045 121/78 97.7 °F (36.5 °C) Oral 60 18 96 %   01/25/23 0430 106/76 98 °F (36.7 °C) Oral (!) 102 20 99 %   01/24/23 2231 92/78 98.2 °F (36.8 °C) Oral 57 20 96 %   01/24/23 2015 107/85 98.2 °F (36.8 °C) Oral 67 20 98 %   01/24/23 1621 110/75 97.9 °F (36.6 °C) Oral 58 20 97 %   01/24/23 1524 -- -- -- 58 -- --     Weight: Weight: 168 lb (76.2 kg)     24 hour intake/output:  Intake/Output Summary (Last 24 hours) at 1/25/2023 1511  Last data filed at 1/24/2023 1832  Gross per 24 hour   Intake 1180.69 ml   Output --   Net 1180.69 ml       General appearance - alert, well appearing, and in no distress  Eyes - pupils equal and reactive, extraocular eye movements intact  Mouth - mucous membranes moist, pharynx normal without lesions  Neck - supple, no significant adenopathy  Chest - clear to auscultation, no wheezes, rales or rhonchi, symmetric air entry  Heart - normal rate, regular rhythm, normal S1, S2  Abdomen - soft, nontender, nondistended, no masses or organomegaly, pos bs.   Neurological - alert, oriented, normal speech, no focal findings or movement disorder noted  Musculoskeletal - no joint tenderness, deformity or swelling  Extremities - peripheral pulses normal, + edema improving  Skin - normal coloration and turgor, no rashes, no suspicious skin lesions noted    Review of Labs and Diagnostic Testing:    CBC:   Recent Labs     01/24/23  0954   WBC 8.3   HGB 13.5   HCT 43.5   MCV 86.5        BMP:   Recent Labs     01/24/23  0954      K 4.6      CO2 22*   BUN 20   CREATININE 1.3*   CALCIUM 8.9   GLUCOSE 118*     PT/INR: No results for input(s): PROTIME, INR in the last 72 hours. APTT: No results for input(s): APTT in the last 72 hours. Lipids:   Recent Labs     01/23/23  0930   ALKPHOS 153*   ALT 34   AST 27   BILITOT 0.8   BILIDIR <0.2   LABALBU 3.6   LIPASE 26.7     Troponin:   Recent Labs     01/23/23  0930   TROPONINT < 0.010        Imaging:  [unfilled]    EKG:      Diet: ADULT DIET; Regular; Low Sodium (2 gm)        Data:   Scheduled Meds: Scheduled Meds:   amiodarone  200 mg Oral BID    [START ON 2/8/2023] amiodarone  200 mg Oral Daily    apixaban  5 mg Oral BID    metoprolol tartrate  50 mg Oral BID    rosuvastatin  20 mg Oral Nightly    sertraline  25 mg Oral Daily    multivitamin  1 tablet Oral Daily    sodium chloride flush  5-40 mL IntraVENous 2 times per day     Continuous Infusions:   sodium chloride       PRN Meds:.calcium carbonate, sodium chloride flush, sodium chloride, ondansetron **OR** ondansetron, polyethylene glycol, acetaminophen **OR** acetaminophen  Continuous Infusions:   sodium chloride           Assessment   Atrial Fibrillation with RVR on OAC and rate control   Nonobstructive CAD   Hyperlipidemia   Depression   Obesity    Stress test-  Summary    There were no significant perfusion abnormalities. Myocardial perfusion imaging was normal at rest and with stress. Left ventricular systolic function was mildly reduced. Recommendation    Clinical correlation is recommended. Aggressive risk factor management.          Plan   Cardiology following-Cont amio - change to 200 po bid x 2 weeks then 200 daily  TTE to be done today  Pt on eliquis  TSH elevated, T4 free WNL- will repeat as outpt      Electronically signed by FELICITAS Gómez CNP on 1/25/2023 at 3:11 PM    Assessment and plan of care discussed with supervising physician, Dr Evelia Balderas.    Pt seen and examined by me earlier  D/w Rosalie Liang  D/w cardiology   Pt now in sinus bradycardia but still SOB   Mary Grace for stress test and ECHO  Heart S1 S2 heard bradycardic    Electronically signed by Cody Meadows MD on 1/25/2023 at 10:34 PM

## 2023-01-25 NOTE — PLAN OF CARE
Problem: Discharge Planning  Goal: Discharge to home or other facility with appropriate resources  1/25/2023 1335 by Oswaldo Gomez RN  Outcome: Progressing  Flowsheets (Taken 1/25/2023 1335)  Discharge to home or other facility with appropriate resources: Identify barriers to discharge with patient and caregiver  Note: Patient planning to return home at discharge. Patient denies discharge needs at this time. Case management following. Problem: Pain  Goal: Verbalizes/displays adequate comfort level or baseline comfort level  1/25/2023 1335 by Oswaldo Gomez RN  Outcome: Progressing  Flowsheets (Taken 1/25/2023 1335)  Verbalizes/displays adequate comfort level or baseline comfort level: Encourage patient to monitor pain and request assistance  Note: Patient denies pain when asked. Pain goal 0/10. PRN Tylenol if needed. Problem: Safety - Adult  Goal: Free from fall injury  1/25/2023 1335 by Oswaldo Gomez RN  Outcome: Progressing  Flowsheets (Taken 1/25/2023 1335)  Free From Fall Injury: Instruct family/caregiver on patient safety  Note: Patient free from falls. No injuries noted. Fall precautions in place and alarms in use. Problem: Cardiovascular - Adult  Goal: Maintains optimal cardiac output and hemodynamic stability  Outcome: Progressing  Flowsheets (Taken 1/25/2023 1335)  Maintains optimal cardiac output and hemodynamic stability: Monitor blood pressure and heart rate  Note: Vital signs stable. Patient denies any chest pain but does complain of shortness of breath. Cardiology following. Stress test this AM.      Problem: Cardiovascular - Adult  Goal: Absence of cardiac dysrhythmias or at baseline  Outcome: Progressing  Flowsheets (Taken 1/25/2023 1335)  Absence of cardiac dysrhythmias or at baseline: Monitor cardiac rate and rhythm  Note: Patient continues on telemetry, sinus susy - sinus rhythm noted. Patient was admitted for atrial fib with RVR.        Problem: Musculoskeletal - Adult  Goal: Return mobility to safest level of function  Outcome: Progressing  Flowsheets (Taken 1/25/2023 6819)  Return Mobility to Safest Level of Function: Assess patient stability and activity tolerance for standing, transferring and ambulating with or without assistive devices  Note: Patient up in room with staff, gait steady. Care plan reviewed with patient. Patient verbalizes understanding of the plan of care and contributes to goal setting.

## 2023-01-26 PROCEDURE — 1200000003 HC TELEMETRY R&B

## 2023-01-26 PROCEDURE — 1200000000 HC SEMI PRIVATE

## 2023-01-26 PROCEDURE — 2580000003 HC RX 258

## 2023-01-26 PROCEDURE — 6370000000 HC RX 637 (ALT 250 FOR IP)

## 2023-01-26 PROCEDURE — 6370000000 HC RX 637 (ALT 250 FOR IP): Performed by: PHYSICIAN ASSISTANT

## 2023-01-26 RX ADMIN — ROSUVASTATIN CALCIUM 20 MG: 20 TABLET, FILM COATED ORAL at 20:08

## 2023-01-26 RX ADMIN — APIXABAN 5 MG: 5 TABLET, FILM COATED ORAL at 08:27

## 2023-01-26 RX ADMIN — ACETAMINOPHEN 650 MG: 325 TABLET ORAL at 20:16

## 2023-01-26 RX ADMIN — AMIODARONE HYDROCHLORIDE 200 MG: 200 TABLET ORAL at 20:09

## 2023-01-26 RX ADMIN — APIXABAN 5 MG: 5 TABLET, FILM COATED ORAL at 20:09

## 2023-01-26 RX ADMIN — Medication 1 TABLET: at 08:27

## 2023-01-26 RX ADMIN — METOPROLOL TARTRATE 50 MG: 50 TABLET, FILM COATED ORAL at 20:09

## 2023-01-26 RX ADMIN — SODIUM CHLORIDE, PRESERVATIVE FREE 10 ML: 5 INJECTION INTRAVENOUS at 20:10

## 2023-01-26 RX ADMIN — SERTRALINE 25 MG: 25 TABLET, FILM COATED ORAL at 08:27

## 2023-01-26 RX ADMIN — AMIODARONE HYDROCHLORIDE 200 MG: 200 TABLET ORAL at 08:27

## 2023-01-26 RX ADMIN — SODIUM CHLORIDE, PRESERVATIVE FREE 10 ML: 5 INJECTION INTRAVENOUS at 08:30

## 2023-01-26 ASSESSMENT — PAIN DESCRIPTION - LOCATION
LOCATION: GENERALIZED
LOCATION: NOSE

## 2023-01-26 ASSESSMENT — PAIN SCALES - GENERAL
PAINLEVEL_OUTOF10: 3
PAINLEVEL_OUTOF10: 0

## 2023-01-26 ASSESSMENT — PAIN - FUNCTIONAL ASSESSMENT: PAIN_FUNCTIONAL_ASSESSMENT: ACTIVITIES ARE NOT PREVENTED

## 2023-01-26 ASSESSMENT — PAIN DESCRIPTION - PAIN TYPE: TYPE: CHRONIC PAIN

## 2023-01-26 ASSESSMENT — PAIN DESCRIPTION - DESCRIPTORS: DESCRIPTORS: ACHING

## 2023-01-26 ASSESSMENT — PAIN DESCRIPTION - ORIENTATION: ORIENTATION: RIGHT

## 2023-01-26 ASSESSMENT — PAIN DESCRIPTION - FREQUENCY: FREQUENCY: INTERMITTENT

## 2023-01-26 ASSESSMENT — PAIN DESCRIPTION - ONSET: ONSET: ON-GOING

## 2023-01-26 NOTE — CARE COORDINATION
1/26/23, 12:45 PM EST    Patient goals/plan/ treatment preferences discussed by  and . Patient goals/plan/ treatment preferences reviewed with patient/ family. Patient/ family verbalize understanding of discharge plan and are in agreement with goal/plan/treatment preferences. Understanding was demonstrated using the teach back method. AVS provided by RN at time of discharge, which includes all necessary medical information pertaining to the patients current course of illness, treatment, post-discharge goals of care, and treatment preferences. Services At/After Discharge: None       IMM Letter  IMM Letter given to Patient/Family/Significant other/Guardian/POA/by[de-identified] Angi MOHR Case Manager. IMM Letter date given[de-identified] 01/26/23  IMM Letter time given[de-identified] 200    Revisit with pt today. States she may be discharged today. Pt denies discharge needs. States she will be going home with her daughter to [place] for recovery period before returning home alone.

## 2023-01-26 NOTE — PROGRESS NOTES
Progress note      [facility]             Patient:  Guilherme Perez  YOB: 1945    MRN: [id number]   Acct:  [de-identified]   [id number]  Primary Care Physician: Ct Kunz MD    Admit Date: 1/23/2023           Subjective: Pt in bed alert and in no acute distress. Pt denies any chest pain, continued SOB with activity. Objective:      Physical Exam:    Vitals:Patient Vitals for the past 24 hrs:   BP Temp Temp src Pulse Resp SpO2   01/26/23 1100 126/76 97.8 °F (36.6 °C) Oral 60 16 98 %   01/26/23 0815 139/85 97.6 °F (36.4 °C) Oral 55 18 97 %   01/26/23 0402 131/84 97.5 °F (36.4 °C) Oral 56 16 95 %   01/25/23 2000 (!) 120/95 97.4 °F (36.3 °C) Oral 60 18 96 %     Weight: Weight: 168 lb (76.2 kg)     24 hour intake/output:  Intake/Output Summary (Last 24 hours) at 1/26/2023 1447  Last data filed at 1/26/2023 0402  Gross per 24 hour   Intake 430 ml   Output --   Net 430 ml       General appearance - alert, well appearing, and in no distress  Eyes - pupils equal and reactive, extraocular eye movements intact  Mouth - mucous membranes moist, pharynx normal without lesions  Neck - supple, no significant adenopathy  Chest - clear to auscultation, no wheezes, rales or rhonchi, symmetric air entry  Heart - normal rate, S1 S2  Abdomen - soft, nontender,  no masses or organomegaly, pos bs.   Neurological - alert, oriented, normal speech, no focal findings or movement disorder noted  Musculoskeletal - no joint tenderness, deformity or swelling  Extremities - peripheral pulses normal, + edema  Skin - normal coloration and turgor, no rashes, no suspicious skin lesions noted    Review of Labs and Diagnostic Testing:    CBC:   Recent Labs     01/24/23  0954   WBC 8.3   HGB 13.5   HCT 43.5   MCV 86.5        BMP:   Recent Labs     01/24/23  0954      K 4.6      CO2 22*   BUN 20   CREATININE 1.3*   CALCIUM 8.9 GLUCOSE 118*     PT/INR: No results for input(s): PROTIME, INR in the last 72 hours. APTT: No results for input(s): APTT in the last 72 hours. Lipids: No results for input(s): ALKPHOS, ALT, AST, BILITOT, BILIDIR, LABALBU, AMYLASE, LIPASE in the last 72 hours. Troponin: No results for input(s): TROPONINT in the last 72 hours. Imaging:  [unfilled]    EKG:      Diet: ADULT DIET; Regular; Low Sodium (2 gm)        Data:   Scheduled Meds: Scheduled Meds:   amiodarone  200 mg Oral BID    [START ON 2/8/2023] amiodarone  200 mg Oral Daily    apixaban  5 mg Oral BID    metoprolol tartrate  50 mg Oral BID    rosuvastatin  20 mg Oral Nightly    sertraline  25 mg Oral Daily    multivitamin  1 tablet Oral Daily    sodium chloride flush  5-40 mL IntraVENous 2 times per day     Continuous Infusions:   sodium chloride       PRN Meds:.calcium carbonate, sodium chloride flush, sodium chloride, ondansetron **OR** ondansetron, polyethylene glycol, acetaminophen **OR** acetaminophen  Continuous Infusions:   sodium chloride           Assessment   Atrial Fibrillation with RVR on OAC and rate control   Nonobstructive CAD   Hyperlipidemia   Depression   Obesity    ECHO- Summary    Normal left ventricular size and severely reduced systolic function. There was severe global hypokinesis. Wall thickness was within normal limits. Ejection fraction was estimated at 25-30%. Left atrial size was severely dilated. Right atrial size was severely dilated. The right ventricular size was severely dilated with mildly reduced    systolic function. There was trace aortic regurgitation. There was moderate tricuspid regurgitation. Assuming RAP = 15 mmHg, the estimated RVSP = 38 mmHg. The pericardium was normal in appearance with a trace pericardial    effusion. IVC size is dilated with reduced respiratory phasic changes (CVP~10-15    mmHg). Stress test- There were no significant perfusion abnormalities. Myocardial perfusion imaging was normal at rest and with stress. Left ventricular systolic function was mildly reduced. LVEF 40%    Plan   Cardiology following- possible cath in am  Life Vest ordered  Pt on Eliquis  BMP in am  PTOT    Electronically signed by FELICITAS Degroot CNP on 1/26/2023 at 2:47 PM    Assessment and plan of care discussed with supervising physician, Dr Jeanie Lester.    Pt seen and examined by me  D/w [address] are for cath tomorrow as  low EF noted  Breathing stable  No CP  Heart S1 S2 heard and regular    Electronically signed by Hannah Elias MD on 1/26/2023 at 4:41 PM

## 2023-01-26 NOTE — PROGRESS NOTES
Perfect serve from attending - pt needs LV     Reviewed echo and stress test - I recommended cardiac cath DT new EF drop despite stress testing     Relayed to attending       FELICITAS Santos - CNP  2:11 PM  1/26/2023

## 2023-01-26 NOTE — PLAN OF CARE
Problem: Discharge Planning  Goal: Discharge to home or other facility with appropriate resources  Outcome: Adequate for Discharge     Problem: Pain  Goal: Verbalizes/displays adequate comfort level or baseline comfort level  Outcome: Adequate for Discharge     Problem: Safety - Adult  Goal: Free from fall injury  Outcome: Adequate for Discharge     Problem: Cardiovascular - Adult  Goal: Maintains optimal cardiac output and hemodynamic stability  Outcome: Adequate for Discharge  Goal: Absence of cardiac dysrhythmias or at baseline  Outcome: Adequate for Discharge     Problem: Musculoskeletal - Adult  Goal: Return mobility to safest level of function  Outcome: Adequate for Discharge

## 2023-01-27 LAB
ABO: NORMAL
ANION GAP SERPL CALC-SCNC: 9 MEQ/L (ref 8–16)
ANTIBODY SCREEN: NORMAL
BUN SERPL-MCNC: 19 MG/DL (ref 7–22)
CALCIUM SERPL-MCNC: 9.1 MG/DL (ref 8.5–10.5)
CHLORIDE SERPL-SCNC: 105 MEQ/L (ref 98–111)
CHOLEST SERPL-MCNC: 89 MG/DL (ref 100–199)
CO2 SERPL-SCNC: 26 MEQ/L (ref 23–33)
CREAT SERPL-MCNC: 1.1 MG/DL (ref 0.4–1.2)
DEPRECATED MEAN GLUCOSE BLD GHB EST-ACNC: 111 MG/DL (ref 70–126)
DEPRECATED RDW RBC AUTO: 45.5 FL (ref 35–45)
EKG ATRIAL RATE: 57 BPM
EKG ATRIAL RATE: 68 BPM
EKG P AXIS: 77 DEGREES
EKG P AXIS: 77 DEGREES
EKG P-R INTERVAL: 156 MS
EKG P-R INTERVAL: 166 MS
EKG Q-T INTERVAL: 394 MS
EKG Q-T INTERVAL: 488 MS
EKG QRS DURATION: 80 MS
EKG QRS DURATION: 82 MS
EKG QTC CALCULATION (BAZETT): 418 MS
EKG QTC CALCULATION (BAZETT): 474 MS
EKG R AXIS: 44 DEGREES
EKG R AXIS: 65 DEGREES
EKG T AXIS: 47 DEGREES
EKG T AXIS: 6 DEGREES
EKG VENTRICULAR RATE: 57 BPM
EKG VENTRICULAR RATE: 68 BPM
ERYTHROCYTE [DISTWIDTH] IN BLOOD BY AUTOMATED COUNT: 15 % (ref 11.5–14.5)
GFR SERPL CREATININE-BSD FRML MDRD: 52 ML/MIN/1.73M2
GLUCOSE SERPL-MCNC: 96 MG/DL (ref 70–108)
HBA1C MFR BLD HPLC: 5.7 % (ref 4.4–6.4)
HCT VFR BLD AUTO: 38.7 % (ref 37–47)
HDLC SERPL-MCNC: 31 MG/DL
HGB BLD-MCNC: 12.3 GM/DL (ref 12–16)
INR PPP: 1.93 (ref 0.85–1.13)
LDLC SERPL CALC-MCNC: 44 MG/DL
MCH RBC QN AUTO: 26.7 PG (ref 26–33)
MCHC RBC AUTO-ENTMCNC: 31.8 GM/DL (ref 32.2–35.5)
MCV RBC AUTO: 83.9 FL (ref 81–99)
PLATELET # BLD AUTO: 264 THOU/MM3 (ref 130–400)
PMV BLD AUTO: 10.1 FL (ref 9.4–12.4)
POTASSIUM SERPL-SCNC: 3.7 MEQ/L (ref 3.5–5.2)
RBC # BLD AUTO: 4.61 MILL/MM3 (ref 4.2–5.4)
RH FACTOR: NORMAL
SODIUM SERPL-SCNC: 140 MEQ/L (ref 135–145)
TRIGL SERPL-MCNC: 70 MG/DL (ref 0–199)
WBC # BLD AUTO: 7.3 THOU/MM3 (ref 4.8–10.8)

## 2023-01-27 PROCEDURE — 6370000000 HC RX 637 (ALT 250 FOR IP): Performed by: NURSE PRACTITIONER

## 2023-01-27 PROCEDURE — 86900 BLOOD TYPING SEROLOGIC ABO: CPT

## 2023-01-27 PROCEDURE — 85610 PROTHROMBIN TIME: CPT

## 2023-01-27 PROCEDURE — 80048 BASIC METABOLIC PNL TOTAL CA: CPT

## 2023-01-27 PROCEDURE — 6360000002 HC RX W HCPCS: Performed by: NURSE PRACTITIONER

## 2023-01-27 PROCEDURE — 6370000000 HC RX 637 (ALT 250 FOR IP)

## 2023-01-27 PROCEDURE — 93010 ELECTROCARDIOGRAM REPORT: CPT | Performed by: INTERNAL MEDICINE

## 2023-01-27 PROCEDURE — 1200000003 HC TELEMETRY R&B

## 2023-01-27 PROCEDURE — 93005 ELECTROCARDIOGRAM TRACING: CPT | Performed by: NURSE PRACTITIONER

## 2023-01-27 PROCEDURE — 85027 COMPLETE CBC AUTOMATED: CPT

## 2023-01-27 PROCEDURE — 80061 LIPID PANEL: CPT

## 2023-01-27 PROCEDURE — 97535 SELF CARE MNGMENT TRAINING: CPT

## 2023-01-27 PROCEDURE — 83036 HEMOGLOBIN GLYCOSYLATED A1C: CPT

## 2023-01-27 PROCEDURE — 86850 RBC ANTIBODY SCREEN: CPT

## 2023-01-27 PROCEDURE — 86901 BLOOD TYPING SEROLOGIC RH(D): CPT

## 2023-01-27 PROCEDURE — 36415 COLL VENOUS BLD VENIPUNCTURE: CPT

## 2023-01-27 PROCEDURE — 99232 SBSQ HOSP IP/OBS MODERATE 35: CPT | Performed by: NURSE PRACTITIONER

## 2023-01-27 PROCEDURE — 97166 OT EVAL MOD COMPLEX 45 MIN: CPT

## 2023-01-27 PROCEDURE — 6370000000 HC RX 637 (ALT 250 FOR IP): Performed by: PHYSICIAN ASSISTANT

## 2023-01-27 PROCEDURE — 93005 ELECTROCARDIOGRAM TRACING: CPT | Performed by: INTERNAL MEDICINE

## 2023-01-27 PROCEDURE — 2580000003 HC RX 258: Performed by: NURSE PRACTITIONER

## 2023-01-27 RX ORDER — SODIUM CHLORIDE 0.9 % (FLUSH) 0.9 %
5-40 SYRINGE (ML) INJECTION EVERY 12 HOURS SCHEDULED
Status: DISCONTINUED | OUTPATIENT
Start: 2023-01-27 | End: 2023-01-29 | Stop reason: HOSPADM

## 2023-01-27 RX ORDER — SODIUM CHLORIDE 0.9 % (FLUSH) 0.9 %
5-40 SYRINGE (ML) INJECTION PRN
Status: DISCONTINUED | OUTPATIENT
Start: 2023-01-27 | End: 2023-01-29 | Stop reason: HOSPADM

## 2023-01-27 RX ORDER — ASPIRIN 325 MG
325 TABLET ORAL ONCE
Status: COMPLETED | OUTPATIENT
Start: 2023-01-27 | End: 2023-01-27

## 2023-01-27 RX ORDER — ENOXAPARIN SODIUM 100 MG/ML
1 INJECTION SUBCUTANEOUS 2 TIMES DAILY
Status: DISCONTINUED | OUTPATIENT
Start: 2023-01-27 | End: 2023-01-28

## 2023-01-27 RX ORDER — SODIUM CHLORIDE 9 MG/ML
INJECTION, SOLUTION INTRAVENOUS PRN
Status: DISCONTINUED | OUTPATIENT
Start: 2023-01-27 | End: 2023-01-29 | Stop reason: HOSPADM

## 2023-01-27 RX ORDER — NITROGLYCERIN 0.4 MG/1
0.4 TABLET SUBLINGUAL EVERY 5 MIN PRN
Status: DISCONTINUED | OUTPATIENT
Start: 2023-01-27 | End: 2023-01-29 | Stop reason: HOSPADM

## 2023-01-27 RX ADMIN — ROSUVASTATIN CALCIUM 20 MG: 20 TABLET, FILM COATED ORAL at 20:38

## 2023-01-27 RX ADMIN — METOPROLOL TARTRATE 50 MG: 50 TABLET, FILM COATED ORAL at 20:38

## 2023-01-27 RX ADMIN — ACETAMINOPHEN 650 MG: 325 TABLET ORAL at 20:37

## 2023-01-27 RX ADMIN — AMIODARONE HYDROCHLORIDE 200 MG: 200 TABLET ORAL at 20:39

## 2023-01-27 RX ADMIN — AMIODARONE HYDROCHLORIDE 200 MG: 200 TABLET ORAL at 09:52

## 2023-01-27 RX ADMIN — ENOXAPARIN SODIUM 80 MG: 100 INJECTION SUBCUTANEOUS at 20:38

## 2023-01-27 RX ADMIN — SODIUM CHLORIDE, PRESERVATIVE FREE 10 ML: 5 INJECTION INTRAVENOUS at 20:40

## 2023-01-27 RX ADMIN — ASPIRIN 325 MG: 325 TABLET ORAL at 05:54

## 2023-01-27 RX ADMIN — SERTRALINE 25 MG: 25 TABLET, FILM COATED ORAL at 09:53

## 2023-01-27 RX ADMIN — Medication 1 TABLET: at 09:53

## 2023-01-27 ASSESSMENT — PAIN DESCRIPTION - LOCATION: LOCATION: GENERALIZED

## 2023-01-27 ASSESSMENT — PAIN DESCRIPTION - FREQUENCY: FREQUENCY: INTERMITTENT

## 2023-01-27 ASSESSMENT — PAIN - FUNCTIONAL ASSESSMENT: PAIN_FUNCTIONAL_ASSESSMENT: ACTIVITIES ARE NOT PREVENTED

## 2023-01-27 ASSESSMENT — PAIN DESCRIPTION - DESCRIPTORS: DESCRIPTORS: ACHING

## 2023-01-27 ASSESSMENT — PAIN SCALES - GENERAL
PAINLEVEL_OUTOF10: 2
PAINLEVEL_OUTOF10: 0
PAINLEVEL_OUTOF10: 0

## 2023-01-27 ASSESSMENT — PAIN DESCRIPTION - PAIN TYPE: TYPE: CHRONIC PAIN

## 2023-01-27 ASSESSMENT — PAIN DESCRIPTION - ORIENTATION: ORIENTATION: LEFT

## 2023-01-27 ASSESSMENT — PAIN DESCRIPTION - ONSET: ONSET: ON-GOING

## 2023-01-27 NOTE — CARE COORDINATION
1/27/23, 2:56 PM EST    DISCHARGE ON GOING EVALUATION    [facility] day: 4  Location: [facility] Reason for admit: Atrial fibrillation with RVR ([address] [I48.91]   Procedure: No.  Barriers to Discharge: Echo revealed EF 25-30%. Plan Cardiac Cath tomorrow. Life Vest ordered. Phone message to Hazel Ramos with [address] regarding order. No return call from [employer] but a second message was left. Documents for [employer] given to primary RN Marvie Klinefelter that she may fax after getting call back from [employer]. [facility] returned call to Marvie Klinefelter who requested documents to be faxed. Documents faxed per this CM. PCP: Vargas Anthony MD  Readmission Risk Score: 9.3%  Patient Goals/Plan/Treatment Preferences: From home alone. Plans to go to daughters home in [place] for recovery period. 1/27/23, 2:59 PM EST    Patient goals/plan/ treatment preferences discussed by  and . Patient goals/plan/ treatment preferences reviewed with patient/ family. Patient/ family verbalize understanding of discharge plan and are in agreement with goal/plan/treatment preferences. Understanding was demonstrated using the teach back method. AVS provided by RN at time of discharge, which includes all necessary medical information pertaining to the patients current course of illness, treatment, post-discharge goals of care, and treatment preferences. Services At/After Discharge: DME [employer] Life Vest       IMM Letter  IMM Letter given to Patient/Family/Significant other/Guardian/POA/by[de-identified] Angi MOHR Case Manager  IMM Letter date given[de-identified] 01/27/23  IMM Letter time given[de-identified] 3007     [id number] discharge in next 48 hours. Plans to go to daughters home in [place] during recovery period.

## 2023-01-27 NOTE — PROGRESS NOTES
Occupational Therapy  [facility] 61  INPATIENT OCCUPATIONAL THERAPY  [facility]  EVALUATION    Time:   Time In: 9  Time Out: 1206  Timed Code Treatment Minutes: 11 Minutes  Minutes: 21          Date: 2023  Patient Name: Darcy Ronquillo,   Gender: female      MRN: [id number]  : 1945  (69 y.o.)  Referring Practitioner: FELICITAS Naidu CNP  Diagnosis: atrial fibrillation  Additional Pertinent Hx: This is a pleasant 69 y.o. female presents with SOB that has been present for 1 month. Patient states that it is constant and is made worse with exertion. Patient was seen by Dr. Pastor Medina on Friday and had scheduled DIAMANTE/DCCV for  for A fib with RVR. Patient states she was unable to wait that long. Patient denies any chest pain, palpitations. Restrictions/Precautions:  Restrictions/Precautions: Fall Risk, Up as Tolerated  Position Activity Restriction  Other position/activity restrictions: monitor HR    Subjective  Chart Reviewed: Yes, Orders, Progress Notes, History and Physical    Subjective: Nurse approved OT evaluation. Pt sitting EOB upon arrival, pleasant and cooperative. A+Ox4    Pain: 0/10:    Vitals: Blood Pressure: pre-activity: 117/74 post-activity: 210/77  HR: pre-activity: 64  post-activity: 75    Social/Functional History:  Lives With: Alone  Type of Home: House  Home Layout: One level  Home Access: Level entry  Home Equipment: [employer], rolling   Bathroom Shower/Tub: Walk-in shower  Bathroom Toilet: Standard  Bathroom Equipment: Shower chair       ADL Assistance: Independent  Homemaking Assistance: Independent  Homemaking Responsibilities: Yes  Ambulation Assistance: Independent  Transfer Assistance: Independent    Active : Yes  Occupation: Retired  Additional Comments: Pt is typically independent with all mobility without Ad, Ind with ADls and IADls, drives and completes her own grocery shopping.  Siblings and in-laws lives close by, children live 2 hours away. VISION:WFL    HEARING:  WFL    COGNITION: WFL    RANGE OF MOTION:  Bilateral Upper Extremity:  WFL    STRENGTH:  Bilateral Upper Extremity:  WFL    SENSATION:   WFL    ADL:   Toileting: Supervision. Toilet Transfer: Supervision. Shoshana Cunningham BALANCE:  Standing Balance: Supervision. TRANSFERS:  Sit to Stand:  Supervision. Stand to Sit: Supervision. FUNCTIONAL MOBILITY:  Assistive Device: None  Assist Level:  Supervision. Distance:  HH distances, no c/o light headedness or heart palpitations. No LOB       Activity Tolerance:  Patient tolerance of  treatment:     good  Assessment:  Assessment: Fany Johnson is a 69 y. o.female that presents with above new performance deficits secondary to atrial fibrillation. Pt is requiring increased assistance for ADLs, functional mobility, ADL transfers compared to baseline level of function. Skilled OT services is warranted to improve above performance deficits and progress pt towards PLOF. Without OT pt is at risk for falls, further decline in functional abilities, increased caregiver burden, increased risk for medical complication as a result of reduce mobility and inability to return to prior level of living. Performance deficits / Impairments: Decreased ADL status, Decreased functional mobility , Decreased strength, Decreased high-level IADLs  Prognosis: Good  REQUIRES OT FOLLOW-UP: Yes  Decision Making: Medium Complexity    Treatment Initiated: Treatment and education initiated within context of evaluation. Evaluation time included review of current medical information, gathering information related to past medical, social and functional history, completion of standardized testing, formal and informal observation of tasks, assessment of data and development of plan of care and goals.   Treatment time included skilled education and facilitation of tasks to increase safety and independence with ADL's for improved functional independence and quality of life.    Discharge Recommendations:  Home with assist PRN    Patient Education:     Patient Education  Education Given To: Patient  Education Provided: Role of Therapy, Plan of Care  Education Method: Demonstration, Verbal  Barriers to Learning: None  Education Outcome: Verbalized understanding, Demonstrated understanding    Equipment Recommendations:  Equipment Needed: No    Plan:  Times Per Week: 3-5x  Times Per Day: Once a day  Current Treatment Recommendations: Strengthening, Balance training, Functional mobility training, Endurance training, Neuromuscular re-education, Self-Care / ADL, Equipment evaluation, education, & procurement.  See long-term goal time frame for expected duration of plan of care.  If no long-term goals established, a short length of stay is anticipated.    Goals:  Patient goals : To return home alone  Short Term Goals  Time Frame for Short Term Goals: until discharge  Short Term Goal 1: Pt will safely navigate to/from bathroom with MI and 0-1 cues for safety awareness.  Short Term Goal 2: Pt will tolerate x5-8 minute dynamic standing with MI including reaching and retrieving items with 0 cues for safety to improve independence with ADLs.  Short Term Goal 3: Pt will perform UB/LB dressing and bathing with MI with HR WNL.         Following session, patient left in safe position with all fall risk precautions in place.

## 2023-01-27 NOTE — PROGRESS NOTES
Cardiology Progress Note      Patient:  Clua Godoy  YOB: 1945  MRN: [id number]   Acct: [de-identified]  [facility] Date:  1/23/2023  Primary Cardiologist: Dr. Yvonne Mahoney   Seen by Dr. Walterine Babinski     Per prior cardiology consult note-  CHIEF COMPLAINT: A fib with RVR, SOB        HPI: This is a pleasant 69 y.o. female presents with SOB that has been present for 1 month. Patient states that it is constant and is made worse with exertion. Patient was seen by Dr. Yvonne Mahoney on Friday and had scheduled DIAMANTE/DCCV for February 1st for A fib with RVR. Patient states she was unable to wait that long. Patient denies any chest pain, palpitations. Patient states that she has not previously had palpitations with her A fib, and her only symptom with A fib is SOB. Patient states that at her appointment on Friday, Dr. Yvonne Mahoney increased her Lopressor and increased her amiodarone. Patient states that this has had no affect on her SOB. Patient reports compliance with her medications. Patient denies any recent Echo after Echo in 2018. Patient denies any history of HTN.  Patient denies any previous MI or CVA    Subjective (Events in last 24 hours):   Pt up in chair   No chest pain or SOB   Pt in agreeance for cath     Discussed LV with her - she understands     Tele SR no ectopy   VSS      Objective:   /82   Pulse 55   Temp 97.9 °F (36.6 °C) (Oral)   Resp 18   Ht 5' 2\" (1.575 m)   Wt 168 lb (76.2 kg)   SpO2 96%   BMI 30.73 kg/m²        TELEMETRY: SR no ectopy     Physical Exam:  General Appearance: alert and oriented to person, place and time, in no acute distress  Cardiovascular: normal rate, regular rhythm, normal S1 and S2, no murmurs, rubs, clicks, or gallops, distal pulses intact,   Pulmonary/Chest: clear to auscultation bilaterally- no wheezes, rales or rhonchi, normal air movement, no respiratory distress  Abdomen: soft, non-tender, non-distended, normal bowel sounds, no masses Extremities: no cyanosis, clubbing or edema, pulse   Musculoskeletal: normal range of motion, no joint swelling, deformity or tenderness  Neurological: alert, oriented, normal speech, no focal findings or movement disorder noted    Medications:    sodium chloride flush  5-40 mL IntraVENous 2 times per day    enoxaparin  1 mg/kg SubCUTAneous BID    amiodarone  200 mg Oral BID    [START ON 2/8/2023] amiodarone  200 mg Oral Daily    [Held by provider] apixaban  5 mg Oral BID    metoprolol tartrate  50 mg Oral BID    rosuvastatin  20 mg Oral Nightly    sertraline  25 mg Oral Daily    multivitamin  1 tablet Oral Daily    sodium chloride flush  5-40 mL IntraVENous 2 times per day      sodium chloride      sodium chloride       sodium chloride flush, 5-40 mL, PRN  sodium chloride, , PRN  nitroGLYCERIN, 0.4 mg, Q5 Min PRN  calcium carbonate, 500 mg, BID PRN  sodium chloride flush, 5-40 mL, PRN  sodium chloride, , PRN  ondansetron, 4 mg, Q8H PRN   Or  ondansetron, 4 mg, Q6H PRN  polyethylene glycol, 17 g, Daily PRN  acetaminophen, 650 mg, Q6H PRN   Or  acetaminophen, 650 mg, Q6H PRN        Diagnostics:    Echo:   Electronically signed by Neri Henderson MD (Interpreting   physician) on 01/26/2023 at 02:00 PM   ----------------------------------------------------------------      Findings      Mitral Valve   The mitral valve structure was normal with normal leaflet separation. DOPPLER: The transmitral velocity was within the normal range with no   evidence for mitral stenosis. There was no evidence of mitral   regurgitation. Aortic Valve   The aortic valve was trileaflet with normal thickness and cuspal   separation. DOPPLER: Transaortic velocity was within the normal range with   no evidence of aortic stenosis. There was trace aortic regurgitation. Tricuspid Valve   The tricuspid valve structure was normal with normal leaflet separation. DOPPLER: There was no evidence of tricuspid stenosis. There was moderate   tricuspid regurgitation.  Assuming RAP = 15 mmHg, the estimated RVSP = 38   mmHg. Pulmonic Valve   The pulmonic valve leaflets were not well seen. DOPPLER: The transpulmonic   velocity was within the normal range with trace regurgitation. Left Atrium   Left atrial size was severely dilated. Left Ventricle   Normal left ventricular size and severely reduced systolic function. There was severe global hypokinesis. Wall thickness was within normal limits. Ejection fraction was estimated at 25-30%. Right Atrium   Right atrial size was severely dilated. Right Ventricle   The right ventricular size was severely dilated with mildly reduced   systolic function. Pericardial Effusion   The pericardium was normal in appearance with a trace pericardial   effusion. Pleural Effusion   No evidence of pleural effusion. Aorta / Great Vessels   IVC size is dilated with reduced respiratory phasic changes (CVP~10-15   mmHg). Stress: Conclusions      Summary   There were no significant perfusion abnormalities. Myocardial perfusion imaging was normal at rest and with stress. Left ventricular systolic function was mildly reduced. Recommendation   Clinical correlation is recommended. Aggressive risk factor management. Signatures      ----------------------------------------------------------------   Electronically signed by Kleber Bliss MD (Interpreting   Cardiologist) on 01/25/2023      Left Heart Cath:    Procedure Summary   LM-PATENT   LAD PATENT   D2 OSTIAL 50% STENOSIS   LCX-PATENT   RCA- MID 35 TO 40% DIFFUSE LESIONS   Normal LV systolic function. LVEF approximately 60% . EDP 14 mmhg   No MR   NO transaortic Gradient      Recommendations   Continue with aggressive risk factor modification and medical therapy. Estimated Blood Loss:5 ml. Complications:No complications.       Signatures      ----------------------------------------------------------------   Electronically signed by Roselyn Diaz Valentina MAHAN (Performing   Physician) on 05/09/2018       Lab Data:    Cardiac Enzymes:  No results for input(s): CKTOTAL, CKMB, CKMBINDEX, TROPONINI in the last 72 hours.     CBC:   Lab Results   Component Value Date/Time    WBC 7.3 01/27/2023 05:40 AM    RBC 4.61 01/27/2023 05:40 AM    HGB 12.3 01/27/2023 05:40 AM    HCT 38.7 01/27/2023 05:40 AM     01/27/2023 05:40 AM       CMP:    Lab Results   Component Value Date/Time     01/27/2023 05:40 AM    K 3.7 01/27/2023 05:40 AM    K 3.9 05/09/2018 09:15 AM     01/27/2023 05:40 AM    CO2 26 01/27/2023 05:40 AM    BUN 19 01/27/2023 05:40 AM    CREATININE 1.1 01/27/2023 05:40 AM    LABGLOM 52 01/27/2023 05:40 AM    GLUCOSE 96 01/27/2023 05:40 AM    CALCIUM 9.1 01/27/2023 05:40 AM       Hepatic Function Panel:    Lab Results   Component Value Date/Time    ALKPHOS 153 01/23/2023 09:30 AM    ALT 34 01/23/2023 09:30 AM    AST 27 01/23/2023 09:30 AM    PROT 5.9 01/23/2023 09:30 AM    BILITOT 0.8 01/23/2023 09:30 AM    BILIDIR <0.2 01/23/2023 09:30 AM    LABALBU 3.6 01/23/2023 09:30 AM       Magnesium:    Lab Results   Component Value Date/Time    MG 1.8 01/23/2023 09:30 AM       PT/INR:    Lab Results   Component Value Date/Time    INR 1.93 01/27/2023 05:40 AM       HgBA1c:    Lab Results   Component Value Date/Time    LABA1C 5.7 01/27/2023 05:40 AM       FLP:    Lab Results   Component Value Date/Time    TRIG 70 01/27/2023 05:40 AM    HDL 31 01/27/2023 05:40 AM    LDLCALC 44 01/27/2023 05:40 AM    LDLDIRECT 168.41 12/10/2021 08:35 AM       TSH:    Lab Results   Component Value Date/Time    TSH 7.310 01/23/2023 09:30 AM         Assessment:    Sob     PAFB RVR - SR maintained with po amio   - on eliquis      New CDMP EF dropped to 25-30% from 60% ---> likley tachy related but cant exclude CAD     HLP   LD 44      Plan:  Eliquis on hold   Cath planned for tomorrow - pt in agreeance   Follow   LV discussed with pt          Electronically signed by Keo Jurado Scottie Rivas - CNP on 1/27/2023 at 1:06 PM

## 2023-01-27 NOTE — PROGRESS NOTES
Francisco [facility] 61  PHYSICAL THERAPY MISSED TREATMENT NOTE  [facility]B    Date: 2023  Patient Name: Marciano Hilario        MRN: [id number]   : 1945  (78 y.o.)  Gender: female                REASON FOR MISSED TREATMENT:  Pt sitting up in recliner and reports that she is moving fine and feels to be at her baseline, so did not feel necessary to have PT evaluation. OT note indicated that pt is at [facility]. Will defer PT evaluation as pt is likely to be discharged home over the weekend. Arpita Gonzalez.  Nereida Hammans, [place] [address]

## 2023-01-27 NOTE — PROGRESS NOTES
Progress note      [facility]             Patient:  Kaleigh Nugent  YOB: 1945    MRN: [id number]   Acct:  [de-identified]   [id number]  Primary Care Physician: Lenore Meadows MD    Admit Date: 1/23/2023           Subjective: Pt in chair reading her book in no distress. Transition off eliquis/lovenox being done for Cath tomorrow at 8am. Pt denies any chest pain, no new SOB. Objective:      Physical Exam:    Vitals:Patient Vitals for the past 24 hrs:   BP Temp Temp src Pulse Resp SpO2   01/27/23 0901 131/82 97.9 °F (36.6 °C) Oral 55 18 96 %   01/27/23 0432 136/82 98.1 °F (36.7 °C) Oral 59 18 97 %   01/26/23 2359 126/85 -- -- 56 16 97 %   01/26/23 2003 117/86 98 °F (36.7 °C) Oral 66 14 95 %   01/26/23 1700 123/74 97.6 °F (36.4 °C) Oral 65 18 97 %     Weight: Weight: 168 lb (76.2 kg)     24 hour intake/output:  Intake/Output Summary (Last 24 hours) at 1/27/2023 1358  Last data filed at 1/27/2023 0439  Gross per 24 hour   Intake 0 ml   Output --   Net 0 ml       General appearance - alert, well appearing, and in no distress  Eyes - pupils equal and reactive, extraocular eye movements intact  Mouth - mucous membranes moist, pharynx normal without lesions  Neck - supple, no significant adenopathy  Chest - clear to auscultation, no wheezes, rales or rhonchi, symmetric air entry  Heart - normal rate, irregularly irregular rhythm  Abdomen - soft, nontender, nondistended, no masses or organomegaly, pos bs.   Neurological - alert, oriented, normal speech, no focal findings or movement disorder noted  Musculoskeletal - no joint tenderness, deformity or swelling  Extremities - peripheral pulses normal  Skin - normal coloration and turgor, no rashes, no suspicious skin lesions noted    Review of Labs and Diagnostic Testing:    CBC:   Recent Labs     01/27/23  0540   WBC 7.3   HGB 12.3   HCT 38.7   MCV 83.9        BMP: Recent Labs     01/27/23  0540      K 3.7      CO2 26   BUN 19   CREATININE 1.1   CALCIUM 9.1   GLUCOSE 96     PT/INR:   Recent Labs     01/27/23  0540   INR 1.93*     APTT: No results for input(s): APTT in the last 72 hours. Lipids: No results for input(s): ALKPHOS, ALT, AST, BILITOT, BILIDIR, LABALBU, AMYLASE, LIPASE in the last 72 hours. Troponin: No results for input(s): TROPONINT in the last 72 hours. Imaging:  [unfilled]    EKG:      Diet: ADULT DIET; Regular        Data:   Scheduled Meds: Scheduled Meds:   sodium chloride flush  5-40 mL IntraVENous 2 times per day    enoxaparin  1 mg/kg SubCUTAneous BID    amiodarone  200 mg Oral BID    [START ON 2/8/2023] amiodarone  200 mg Oral Daily    [Held by provider] apixaban  5 mg Oral BID    metoprolol tartrate  50 mg Oral BID    rosuvastatin  20 mg Oral Nightly    sertraline  25 mg Oral Daily    multivitamin  1 tablet Oral Daily    sodium chloride flush  5-40 mL IntraVENous 2 times per day     Continuous Infusions:   sodium chloride      sodium chloride       PRN Meds:.sodium chloride flush, sodium chloride, nitroGLYCERIN, calcium carbonate, sodium chloride flush, sodium chloride, ondansetron **OR** ondansetron, polyethylene glycol, acetaminophen **OR** acetaminophen  Continuous Infusions:   sodium chloride      sodium chloride           Assessment   Atrial Fibrillation with RVR on OAC and rate control   Nonobstructive CAD   Hyperlipidemia   Depression   Obesity        Plan   Cath tomorrow  Hold Eliquis  PTOT  Probable discharge this weekend. Dr. Kimberly Pitts covering for [address] this weekend.      Electronically signed by FELICITAS Massey CNP on 1/27/2023 at 1:58 PM    Assessment and plan of care discussed with supervising physician, Dr Navneet Mays.    Pt seen and examined by me  D/w [address] are for cath tomorrow to evaluate for low EF  Aware needs life  vest at discharge  To be switched to lovenox prior to cath   Pt made aware of risk of eliquis    Electronically signed by Pedro Pablo Conklin MD on 1/27/2023 at 4:11 PM

## 2023-01-28 ENCOUNTER — APPOINTMENT (OUTPATIENT)
Dept: CARDIAC CATH/INVASIVE PROCEDURES | Age: 78
DRG: 286 | End: 2023-01-28
Payer: MEDICARE

## 2023-01-28 LAB
ANION GAP SERPL CALC-SCNC: 11 MEQ/L (ref 8–16)
BUN SERPL-MCNC: 17 MG/DL (ref 7–22)
CA-I BLD ISE-SCNC: 1.16 MMOL/L (ref 1.12–1.32)
CALCIUM SERPL-MCNC: 9.2 MG/DL (ref 8.5–10.5)
CHLORIDE SERPL-SCNC: 104 MEQ/L (ref 98–111)
CO2 SERPL-SCNC: 27 MEQ/L (ref 23–33)
CREAT SERPL-MCNC: 1 MG/DL (ref 0.4–1.2)
EKG Q-T INTERVAL: 260 MS
EKG QRS DURATION: 132 MS
EKG QTC CALCULATION (BAZETT): 433 MS
EKG R AXIS: 63 DEGREES
EKG T AXIS: -105 DEGREES
EKG VENTRICULAR RATE: 167 BPM
GFR SERPL CREATININE-BSD FRML MDRD: 58 ML/MIN/1.73M2
GLUCOSE SERPL-MCNC: 112 MG/DL (ref 70–108)
MAGNESIUM SERPL-MCNC: 1.9 MG/DL (ref 1.6–2.4)
POTASSIUM SERPL-SCNC: 4 MEQ/L (ref 3.5–5.2)
SODIUM SERPL-SCNC: 142 MEQ/L (ref 135–145)

## 2023-01-28 PROCEDURE — 6360000004 HC RX CONTRAST MEDICATION: Performed by: INTERNAL MEDICINE

## 2023-01-28 PROCEDURE — B2111ZZ FLUOROSCOPY OF MULTIPLE CORONARY ARTERIES USING LOW OSMOLAR CONTRAST: ICD-10-PCS | Performed by: INTERNAL MEDICINE

## 2023-01-28 PROCEDURE — 6370000000 HC RX 637 (ALT 250 FOR IP): Performed by: PHYSICIAN ASSISTANT

## 2023-01-28 PROCEDURE — 80048 BASIC METABOLIC PNL TOTAL CA: CPT

## 2023-01-28 PROCEDURE — 2500000003 HC RX 250 WO HCPCS

## 2023-01-28 PROCEDURE — 4A023N7 MEASUREMENT OF CARDIAC SAMPLING AND PRESSURE, LEFT HEART, PERCUTANEOUS APPROACH: ICD-10-PCS | Performed by: INTERNAL MEDICINE

## 2023-01-28 PROCEDURE — B2151ZZ FLUOROSCOPY OF LEFT HEART USING LOW OSMOLAR CONTRAST: ICD-10-PCS | Performed by: INTERNAL MEDICINE

## 2023-01-28 PROCEDURE — 6360000002 HC RX W HCPCS: Performed by: INTERNAL MEDICINE

## 2023-01-28 PROCEDURE — 82330 ASSAY OF CALCIUM: CPT

## 2023-01-28 PROCEDURE — 93010 ELECTROCARDIOGRAM REPORT: CPT | Performed by: INTERNAL MEDICINE

## 2023-01-28 PROCEDURE — 1200000003 HC TELEMETRY R&B

## 2023-01-28 PROCEDURE — 93005 ELECTROCARDIOGRAM TRACING: CPT | Performed by: INTERNAL MEDICINE

## 2023-01-28 PROCEDURE — 6370000000 HC RX 637 (ALT 250 FOR IP): Performed by: INTERNAL MEDICINE

## 2023-01-28 PROCEDURE — 93458 L HRT ARTERY/VENTRICLE ANGIO: CPT | Performed by: INTERNAL MEDICINE

## 2023-01-28 PROCEDURE — C1769 GUIDE WIRE: HCPCS

## 2023-01-28 PROCEDURE — 6370000000 HC RX 637 (ALT 250 FOR IP)

## 2023-01-28 PROCEDURE — C1894 INTRO/SHEATH, NON-LASER: HCPCS

## 2023-01-28 PROCEDURE — 2580000003 HC RX 258: Performed by: NURSE PRACTITIONER

## 2023-01-28 PROCEDURE — 6360000002 HC RX W HCPCS

## 2023-01-28 PROCEDURE — 93458 L HRT ARTERY/VENTRICLE ANGIO: CPT

## 2023-01-28 PROCEDURE — 36415 COLL VENOUS BLD VENIPUNCTURE: CPT

## 2023-01-28 PROCEDURE — 83735 ASSAY OF MAGNESIUM: CPT

## 2023-01-28 RX ORDER — ASPIRIN 81 MG/1
81 TABLET, CHEWABLE ORAL ONCE
Status: DISCONTINUED | OUTPATIENT
Start: 2023-01-28 | End: 2023-01-29 | Stop reason: HOSPADM

## 2023-01-28 RX ORDER — FUROSEMIDE 10 MG/ML
20 INJECTION INTRAMUSCULAR; INTRAVENOUS ONCE
Status: COMPLETED | OUTPATIENT
Start: 2023-01-28 | End: 2023-01-28

## 2023-01-28 RX ORDER — POTASSIUM CHLORIDE 20 MEQ/1
10 TABLET, EXTENDED RELEASE ORAL
Status: DISCONTINUED | OUTPATIENT
Start: 2023-01-29 | End: 2023-01-29 | Stop reason: HOSPADM

## 2023-01-28 RX ORDER — LEVOTHYROXINE SODIUM 0.03 MG/1
25 TABLET ORAL DAILY
Status: DISCONTINUED | OUTPATIENT
Start: 2023-01-28 | End: 2023-01-29 | Stop reason: HOSPADM

## 2023-01-28 RX ORDER — SODIUM CHLORIDE 0.9 % (FLUSH) 0.9 %
5-40 SYRINGE (ML) INJECTION EVERY 12 HOURS SCHEDULED
Status: DISCONTINUED | OUTPATIENT
Start: 2023-01-28 | End: 2023-01-29 | Stop reason: HOSPADM

## 2023-01-28 RX ORDER — SODIUM CHLORIDE 9 MG/ML
INJECTION, SOLUTION INTRAVENOUS PRN
Status: DISCONTINUED | OUTPATIENT
Start: 2023-01-28 | End: 2023-01-29 | Stop reason: HOSPADM

## 2023-01-28 RX ORDER — SODIUM CHLORIDE 0.9 % (FLUSH) 0.9 %
5-40 SYRINGE (ML) INJECTION PRN
Status: DISCONTINUED | OUTPATIENT
Start: 2023-01-28 | End: 2023-01-29 | Stop reason: HOSPADM

## 2023-01-28 RX ORDER — FUROSEMIDE 20 MG/1
20 TABLET ORAL DAILY
Status: DISCONTINUED | OUTPATIENT
Start: 2023-01-28 | End: 2023-01-29 | Stop reason: HOSPADM

## 2023-01-28 RX ORDER — ACETAMINOPHEN 325 MG/1
650 TABLET ORAL EVERY 4 HOURS PRN
Status: DISCONTINUED | OUTPATIENT
Start: 2023-01-28 | End: 2023-01-29 | Stop reason: HOSPADM

## 2023-01-28 RX ADMIN — AMIODARONE HYDROCHLORIDE 200 MG: 200 TABLET ORAL at 09:14

## 2023-01-28 RX ADMIN — Medication 1 TABLET: at 09:14

## 2023-01-28 RX ADMIN — FUROSEMIDE 20 MG: 20 TABLET ORAL at 12:08

## 2023-01-28 RX ADMIN — SACUBITRIL AND VALSARTAN 1 TABLET: 24; 26 TABLET, FILM COATED ORAL at 20:52

## 2023-01-28 RX ADMIN — AMIODARONE HYDROCHLORIDE 200 MG: 200 TABLET ORAL at 20:53

## 2023-01-28 RX ADMIN — METOPROLOL TARTRATE 50 MG: 50 TABLET, FILM COATED ORAL at 20:56

## 2023-01-28 RX ADMIN — APIXABAN 5 MG: 5 TABLET, FILM COATED ORAL at 20:52

## 2023-01-28 RX ADMIN — SERTRALINE 25 MG: 25 TABLET, FILM COATED ORAL at 09:15

## 2023-01-28 RX ADMIN — SACUBITRIL AND VALSARTAN 1 TABLET: 24; 26 TABLET, FILM COATED ORAL at 12:08

## 2023-01-28 RX ADMIN — FUROSEMIDE 20 MG: 10 INJECTION, SOLUTION INTRAMUSCULAR; INTRAVENOUS at 15:09

## 2023-01-28 RX ADMIN — IOPAMIDOL 40 ML: 755 INJECTION, SOLUTION INTRAVENOUS at 09:11

## 2023-01-28 RX ADMIN — LEVOTHYROXINE SODIUM 25 MCG: 0.03 TABLET ORAL at 12:08

## 2023-01-28 RX ADMIN — ACETAMINOPHEN 650 MG: 325 TABLET ORAL at 20:53

## 2023-01-28 RX ADMIN — SODIUM CHLORIDE, PRESERVATIVE FREE 10 ML: 5 INJECTION INTRAVENOUS at 20:54

## 2023-01-28 RX ADMIN — ROSUVASTATIN CALCIUM 20 MG: 20 TABLET, FILM COATED ORAL at 20:53

## 2023-01-28 ASSESSMENT — PAIN DESCRIPTION - ORIENTATION: ORIENTATION: ANTERIOR

## 2023-01-28 ASSESSMENT — PAIN DESCRIPTION - LOCATION: LOCATION: HEAD

## 2023-01-28 ASSESSMENT — PAIN SCALES - GENERAL
PAINLEVEL_OUTOF10: 0
PAINLEVEL_OUTOF10: 5

## 2023-01-28 ASSESSMENT — PAIN - FUNCTIONAL ASSESSMENT: PAIN_FUNCTIONAL_ASSESSMENT: ACTIVITIES ARE NOT PREVENTED

## 2023-01-28 ASSESSMENT — PAIN DESCRIPTION - ONSET: ONSET: ON-GOING

## 2023-01-28 ASSESSMENT — PAIN DESCRIPTION - PAIN TYPE: TYPE: ACUTE PAIN

## 2023-01-28 ASSESSMENT — PAIN DESCRIPTION - FREQUENCY: FREQUENCY: INTERMITTENT

## 2023-01-28 ASSESSMENT — PAIN DESCRIPTION - DESCRIPTORS: DESCRIPTORS: ACHING

## 2023-01-28 NOTE — PROGRESS NOTES
INTERNAL MEDICINE Progress Note  1/28/2023 10:36 AM    IM coverage for IMS/  Meghan Mckeon    Subjective:   Admit Date: 1/23/2023  PCP: Darlene Hooks MD  Interval History:     admitted for a fib/RVR  Echo with reduced EF 25%  [facility] today-non obst dx    Objective:   Vitals: BP (!) 142/88   Pulse 52   Temp 98 °F (36.7 °C) (Oral)   Resp 18   Ht 5' 2\" (1.575 m)   Wt 168 lb (76.2 kg)   SpO2 97%   BMI 30.73 kg/m²   General appearance: alert and cooperative with exam  HEENT: Head: atraumatic  Neck: no adenopathy, no carotid bruit, and no JVD  Lungs: clear to auscultation bilaterally  Heart: irregularly irregular rhythm and S1, S2 normal  Abdomen: soft, non-tender; bowel sounds normal; no masses,  no organomegaly  Extremities: no edema, redness or tenderness in the calves or thighs  Neurologic: Mental status: Alert, oriented, thought content appropriate      Medications:   Scheduled Meds:   aspirin  81 mg Oral Once    sodium chloride flush  5-40 mL IntraVENous 2 times per day    enoxaparin  1 mg/kg SubCUTAneous BID    amiodarone  200 mg Oral BID    [START ON 2/8/2023] amiodarone  200 mg Oral Daily    [Held by provider] apixaban  5 mg Oral BID    metoprolol tartrate  50 mg Oral BID    rosuvastatin  20 mg Oral Nightly    sertraline  25 mg Oral Daily    multivitamin  1 tablet Oral Daily    sodium chloride flush  5-40 mL IntraVENous 2 times per day     Continuous Infusions:   sodium chloride      sodium chloride         Lab Results:   CBC:   Recent Labs     01/27/23  0540   WBC 7.3   HGB 12.3        BMP:    Recent Labs     01/27/23  0540      K 3.7      CO2 26   BUN 19   CREATININE 1.1   GLUCOSE 96       Lipids:   Recent Labs     01/27/23  0540   CHOL 89*   HDL 31     INR:   Recent Labs     01/27/23  0540   INR 1.93*       Magnesium:    Lab Results   Component Value Date/Time    MG 1.8 01/23/2023 09:30 AM       HgBA1c:    Lab Results   Component Value Date/Time    LABA1C 5.7 01/27/2023 05:40 AM TSH:    Lab Results   Component Value Date/Time    TSH 7.310 01/23/2023 09:30 AM     ECHO Summary   Normal left ventricular size and severely reduced systolic function. There was severe global hypokinesis. Wall thickness was within normal limits. Ejection fraction was estimated at 25-30%. Left atrial size was severely dilated. Right atrial size was severely dilated. The right ventricular size was severely dilated with mildly reduced   systolic function. There was trace aortic regurgitation. There was moderate tricuspid regurgitation. Assuming RAP = 15 mmHg, the estimated RVSP = 38 mmHg. The pericardium was normal in appearance with a trace pericardial   effusion. IVC size is dilated with reduced respiratory phasic changes (CVP~10-15   mmHg). Signature   ----------------------------------------------------------------   Electronically signed by Edgardo Red MD (Interpreting   physician) on 01/26/2023 at 02:00 PM    Assessment and Plan:    Atrial Fibrillation with RVR on OAC and rate control    Nonobstructive CAD    Acute systolic CHF EF 95-40%   Hyperlipidemia    Depression    Obesity  hypothyroidism      Plan   BB, eliquis  Entresto  Lasix 20 mg daily  Start synthroid  Life vest at [place]. Cardiology f/up as OP.   F/up official [facility] report    Hang Erwin MD, MD

## 2023-01-28 NOTE — H&P
[facility]  Sedation/Analgesia History & Physical    Pt Name: Keke Lee  Account number: [id number]  MRN: [id number]  YOB: 1945  Provider Performing Procedure: Rivka Diez MD MD  Referring Provider: Alex Vences MD   Primary Care Physician: Jermaine Cardoso MD  Date: 1/28/2023    PRE-PROCEDURE    Code Status: FULL CODE  Brief History/Pre-Procedure Diagnosis:    New CDMP EF dropped to 25-30% from 60%  Consent: : I have discussed with the patient risks, benefits, and alternatives (and relevant risks, benefits, and side effects related to alternatives or not receiving care), and likelihood of the success.   The patient and/or representative appear to understand and agree to proceed.  The discussion encompasses risks, benefits, and side effects related to the alternatives and the risks related to not receiving the proposed care, treatment, and services.     The indication, risks and benefits of the procedure and possible therapeutic consequences and alternatives were discussed with the patient. The patient was given the opportunity to ask questions and to have them answered to his/her satisfaction. Risks of the procedure include but are not limited to the following: Bleeding, hematoma including retroperitoneal hemmorhage, infection, pain and discomfort, injury to the aorta and other blood vessels, rhythm disturbance, low blood pressure, myocardial infarction, stroke, kidney damage/failure, myocardial perforation, allergic reactions to sedatives/contrast material, loss of pulse/vascular compromise requiring surgery, aneurysm/pseudoaneurysm formation, possible loss of a limb/hand/leg, needing blood transfusion, requiring emergent open heart surgery or emergent coronary intervention, the need for intubation/respiratory support, the requirement for defibrillation/cardioversion, and death. Alternatives to and omission of the suggested procedure were discussed. The  patient had no further questions and wished to proceed; the consent form was signed. MEDICAL HISTORY  []ASHD/ANGINA/MI/CHF   []Hypertension  []Diabetes  []Hyperlipidemia  []Smoking  []Family Hx of ASHD  []Additional information:       has a past medical history of Adenomatous colon polyp, Coronary artery disease involving native coronary artery of native heart without angina pectoris, Fibrocystic breast disease, Hyperlipidemia, MVP (mitral valve prolapse), Osteopenia, PONV (postoperative nausea and vomiting), and Vitamin D deficiency. SURGICAL HISTORY   has a past surgical history that includes Total abdominal hysterectomy w/ bilateral salpingoophorectomy (1991); Colonoscopy (2011); Colonoscopy (2006); Tonsillectomy (1980); Colonoscopy (01/25/2017); and Hysterectomy (1991).   Additional information:       ALLERGIES   Allergies as of 01/23/2023    (No Known Allergies)     Additional information:       MEDICATIONS   Aspirin  [] 81 mg  [] 325 mg  [] None  Antiplatelet drug therapy use last 5 days  []No []Yes  Coumadin Use Last 5 Days []No []Yes  Other anticoagulant use last 5 days  []No []Yes    Current Facility-Administered Medications:     aspirin chewable tablet 81 mg, 81 mg, Oral, Once, Rivka Diez MD    sodium chloride flush 0.9 % injection 5-40 mL, 5-40 mL, IntraVENous, 2 times per day, FELICITAS Daigle - CNP, 10 mL at 01/27/23 2040    sodium chloride flush 0.9 % injection 5-40 mL, 5-40 mL, IntraVENous, PRN, FELICITAS Daigle - CNP    0.9 % sodium chloride infusion, , IntraVENous, PRN, FELICITAS Daigle - CNP    nitroGLYCERIN (NITROSTAT) SL tablet 0.4 mg, 0.4 mg, SubLINGual, Q5 Min PRN, FELICITAS Daigle - CNP    enoxaparin (LOVENOX) injection 80 mg, 1 mg/kg, SubCUTAneous, BID, FELICITAS Daigle - CNP, 80 mg at 01/27/23 2038    amiodarone (CORDARONE) tablet 200 mg, 200 mg, Oral, BID, Patricia Church PA-C, 200 mg at 01/27/23 2039    [START ON 2/8/2023] amiodarone (CORDARONE) tablet 200 mg, 200 mg, Oral, Daily, Marlene Cespedes PA-C    [Held by provider] apixaban (ELIQUIS) tablet 5 mg, 5 mg, Oral, BID, Blanka Villatoro PA-C, 5 mg at 01/26/23 2009    calcium carbonate (TUMS) chewable tablet 500 mg, 500 mg, Oral, BID PRN, Blanka Villatoro PA-C    metoprolol tartrate (LOPRESSOR) tablet 50 mg, 50 mg, Oral, BID, Blanka Villatoro PA-C, 50 mg at 01/27/23 2038    rosuvastatin (CRESTOR) tablet 20 mg, 20 mg, Oral, Nightly, Blanka Villatoro PA-C, 20 mg at 01/27/23 2038    sertraline (ZOLOFT) tablet 25 mg, 25 mg, Oral, Daily, Blanka Villatoro PA-C, 25 mg at 01/27/23 0953    multivitamin 1 tablet, 1 tablet, Oral, Daily, Blanka Villatoro PA-C, 1 tablet at 01/27/23 0953    sodium chloride flush 0.9 % injection 5-40 mL, 5-40 mL, IntraVENous, 2 times per day, Blanka Villatoro PA-C, 10 mL at 01/26/23 2010    sodium chloride flush 0.9 % injection 5-40 mL, 5-40 mL, IntraVENous, PRN, Blanka Villatoro PA-C    0.9 % sodium chloride infusion, , IntraVENous, PRN, Blanka Villatoro PA-C    ondansetron (ZOFRAN-ODT) disintegrating tablet 4 mg, 4 mg, Oral, Q8H PRN **OR** ondansetron (ZOFRAN) injection 4 mg, 4 mg, IntraVENous, Q6H PRN, Blanka Villatoro PA-C    polyethylene glycol (GLYCOLAX) packet 17 g, 17 g, Oral, Daily PRN, Blanka Villatoro PA-C    acetaminophen (TYLENOL) tablet 650 mg, 650 mg, Oral, Q6H PRN, 650 mg at 01/27/23 2037 **OR** acetaminophen (TYLENOL) suppository 650 mg, 650 mg, Rectal, Q6H PRN, Perry Barroso PA-C  Prior to Admission medications    Medication Sig Start Date End Date Taking?  Authorizing Provider   amiodarone (CORDARONE) 200 MG tablet TAKE 2 TABLETS BY MOUTH TWICE DAILY 1/23/23   Macdonald Lesches, APRN - CNP   amiodarone (PACERONE) 100 MG tablet TAKE 1 TABLET BY MOUTH TWICE DAILY 1/23/23   Macdonald Lesches, APRN - CNP   metoprolol tartrate (LOPRESSOR) 50 MG tablet Take 1 tablet by mouth 2 times daily 1/20/23   Kai Yee MD   amiodarone (PACERONE) 400 MG tablet Take 1 tablet by mouth 2 times daily 1/20/23   Brian Lofton MD   apixaban (ELIQUIS) 5 MG TABS tablet TAKE 1 TABLET TWICE A DAY 1/20/23   Brian Lofton MD   sertraline (ZOLOFT) 25 MG tablet Take 1 tablet by mouth daily 11/28/22   Obey Dillon MD   rosuvastatin (CRESTOR) 20 MG tablet TAKE 1 TABLET DAILY 11/10/22   Brian Lofton MD   Calcium Carbonate (CALCIUM 600 PO) Take by mouth    Historical Provider, MD   Multiple Vitamins-Minerals (THERAPEUTIC MULTIVITAMIN-MINERALS) tablet Take 1 tablet by mouth daily    Historical Provider, MD   betamethasone valerate (VALISONE) 0.1 % cream Apply topically 2 times daily. 6/10/22   FELICITAS Fraga - CNP   Cholecalciferol (VITAMIN D3) 5000 units CAPS Take 1 capsule by mouth Daily  Patient taking differently: Take 1 capsule by mouth See Admin Instructions 3 times a week 3/8/18   Obey Dillon MD     Additional information:       VITAL SIGNS   Vitals:    01/28/23 0435   BP: (!) 142/88   Pulse: 52   Resp: 18   Temp: 98 °F (36.7 °C)   SpO2: 97%       PHYSICAL:   General: No acute distress  HEENT:  Unremarkable for age  Neck: without increased JVD, carotid pulses 2+ bilaterally without bruits  Heart: RRR, S1 & S2 WNL. No murmurs    Lungs: Clear to auscultation    Abdomen: BS present, without HSM, masses, or tenderness    Extremities: without C,C,E.  Pulses 2+ bilaterally   Mental Status: Alert & Oriented        PLANNED PROCEDURE   [x]Cath  [x]PCI                []Pacemaker/AICD  []DIAMANTE             []Cardioversion []Peripheral angiography/PTA  []Other:      SEDATION  Planned agent:[x]Midazolam []Meperidine []Sublimaze []Morphine  []Diazepam  []Other:     ASA Classification:  []1 []2 [x]3 []4 []5   Class 1: A normal healthy patient  Class 2: Pt with mild to moderate systemic disease  Class 3: Severe systemic disease or disturbance  Class 4: Severe systemic disorders that are already life threatening.   Class 5: Moribund pt with little chances of survival, for more than 24 hours.  Mallampati I Airway Classification:   []1 []2 [x]3 []4     [x]Pre-procedure diagnostic studies complete and results available.   Comment:    [x]Previous sedation/anesthesia experiences assessed.   Comment:    [x]The patient is an appropriate candidate to undergo the planned procedure sedation and anesthesia. (Refer to nursing sedation/analgesia documentation record)  [x]Formulation and discussion of sedation/procedure plan, risks, and expectations with patient and/or responsible adult completed.  [x]Patient examined immediately prior to the procedure. (Refer to nursing sedation/analgesia documentation record)      Rivka Diez MD, FACC, [facility]    Electronically signed 1/28/2023 at 8:47 AM

## 2023-01-28 NOTE — BRIEF OP NOTE
[address] [address]  Sedation/Analgesia Post Sedation Record    Pt Name: James Serrano  Account number: [de-identified]  MRN: [id number]  YOB: 1945  Procedure Performed By: Mumtaz Thomson MD MD   Primary Care Physician: Natasha Foster MD  Date: 1/28/2023    POST-PROCEDURE    Physicians/Assistants: Mumtaz Thomson MD MD     Procedure Performed:Cath      Sedation/Anesthesia: Versed/ Fentanyl and 2% xylocaine local anesthesia. Estimated Blood Loss: < 50 ml. Specimens Removed: None         Disposition of Specimen: N/A        Complications: No Immediate Complications. Post-procedure Diagnosis/Findings:       Nonobstructive CAD      Recommendations:  Medical management      Above findings and plan of care were discussed with patient and her family, questions were answered, agreeable with plan.        Mumtaz Thomson MD, Acie Mays   Electronically signed 1/28/2023 at 11:57 AM  Interventional Cardiology

## 2023-01-29 VITALS
HEART RATE: 58 BPM | RESPIRATION RATE: 18 BRPM | WEIGHT: 168 LBS | TEMPERATURE: 98 F | DIASTOLIC BLOOD PRESSURE: 65 MMHG | BODY MASS INDEX: 30.91 KG/M2 | HEIGHT: 62 IN | OXYGEN SATURATION: 94 % | SYSTOLIC BLOOD PRESSURE: 130 MMHG

## 2023-01-29 PROBLEM — I50.21 ACUTE SYSTOLIC CONGESTIVE HEART FAILURE (HCC): Status: ACTIVE | Noted: 2023-01-29

## 2023-01-29 LAB
ANION GAP SERPL CALC-SCNC: 11 MEQ/L (ref 8–16)
BUN SERPL-MCNC: 12 MG/DL (ref 7–22)
CALCIUM SERPL-MCNC: 8.4 MG/DL (ref 8.5–10.5)
CHLORIDE SERPL-SCNC: 102 MEQ/L (ref 98–111)
CO2 SERPL-SCNC: 29 MEQ/L (ref 23–33)
CREAT SERPL-MCNC: 1 MG/DL (ref 0.4–1.2)
DEPRECATED RDW RBC AUTO: 44.8 FL (ref 35–45)
EKG ATRIAL RATE: 50 BPM
EKG P AXIS: 94 DEGREES
EKG P-R INTERVAL: 160 MS
EKG Q-T INTERVAL: 562 MS
EKG QRS DURATION: 84 MS
EKG QTC CALCULATION (BAZETT): 512 MS
EKG R AXIS: 64 DEGREES
EKG T AXIS: 130 DEGREES
EKG VENTRICULAR RATE: 50 BPM
ERYTHROCYTE [DISTWIDTH] IN BLOOD BY AUTOMATED COUNT: 14.8 % (ref 11.5–14.5)
GFR SERPL CREATININE-BSD FRML MDRD: 58 ML/MIN/1.73M2
GLUCOSE SERPL-MCNC: 86 MG/DL (ref 70–108)
HCT VFR BLD AUTO: 41.1 % (ref 37–47)
HGB BLD-MCNC: 13.1 GM/DL (ref 12–16)
MCH RBC QN AUTO: 26.6 PG (ref 26–33)
MCHC RBC AUTO-ENTMCNC: 31.9 GM/DL (ref 32.2–35.5)
MCV RBC AUTO: 83.5 FL (ref 81–99)
PLATELET # BLD AUTO: 282 THOU/MM3 (ref 130–400)
PMV BLD AUTO: 9.9 FL (ref 9.4–12.4)
POTASSIUM SERPL-SCNC: 3.2 MEQ/L (ref 3.5–5.2)
RBC # BLD AUTO: 4.92 MILL/MM3 (ref 4.2–5.4)
SODIUM SERPL-SCNC: 142 MEQ/L (ref 135–145)
WBC # BLD AUTO: 6.1 THOU/MM3 (ref 4.8–10.8)

## 2023-01-29 PROCEDURE — 99232 SBSQ HOSP IP/OBS MODERATE 35: CPT | Performed by: NURSE PRACTITIONER

## 2023-01-29 PROCEDURE — 36415 COLL VENOUS BLD VENIPUNCTURE: CPT

## 2023-01-29 PROCEDURE — 6370000000 HC RX 637 (ALT 250 FOR IP): Performed by: INTERNAL MEDICINE

## 2023-01-29 PROCEDURE — 85027 COMPLETE CBC AUTOMATED: CPT

## 2023-01-29 PROCEDURE — 6370000000 HC RX 637 (ALT 250 FOR IP)

## 2023-01-29 PROCEDURE — 80048 BASIC METABOLIC PNL TOTAL CA: CPT

## 2023-01-29 PROCEDURE — 93005 ELECTROCARDIOGRAM TRACING: CPT | Performed by: INTERNAL MEDICINE

## 2023-01-29 PROCEDURE — 6370000000 HC RX 637 (ALT 250 FOR IP): Performed by: PHYSICIAN ASSISTANT

## 2023-01-29 PROCEDURE — 93010 ELECTROCARDIOGRAM REPORT: CPT | Performed by: INTERNAL MEDICINE

## 2023-01-29 RX ORDER — ASPIRIN 81 MG/1
81 TABLET, CHEWABLE ORAL ONCE
Qty: 30 TABLET | Refills: 3 | Status: SHIPPED | OUTPATIENT
Start: 2023-01-29 | End: 2023-01-29 | Stop reason: SDUPTHER

## 2023-01-29 RX ORDER — AMIODARONE HYDROCHLORIDE 200 MG/1
200 TABLET ORAL 2 TIMES DAILY
Qty: 20 TABLET | Refills: 0 | Status: SHIPPED | OUTPATIENT
Start: 2023-01-29 | End: 2023-02-08

## 2023-01-29 RX ORDER — POTASSIUM CHLORIDE 750 MG/1
20 TABLET, EXTENDED RELEASE ORAL DAILY
Qty: 60 TABLET | Refills: 3 | Status: SHIPPED | OUTPATIENT
Start: 2023-01-29

## 2023-01-29 RX ORDER — LEVOTHYROXINE SODIUM 0.03 MG/1
25 TABLET ORAL DAILY
Qty: 30 TABLET | Refills: 3 | Status: SHIPPED | OUTPATIENT
Start: 2023-01-30

## 2023-01-29 RX ORDER — POTASSIUM CHLORIDE 20 MEQ/1
20 TABLET, EXTENDED RELEASE ORAL ONCE
Status: COMPLETED | OUTPATIENT
Start: 2023-01-29 | End: 2023-01-29

## 2023-01-29 RX ORDER — FUROSEMIDE 20 MG/1
20 TABLET ORAL DAILY
Qty: 60 TABLET | Refills: 3 | Status: SHIPPED | OUTPATIENT
Start: 2023-01-29 | End: 2023-01-30

## 2023-01-29 RX ORDER — AMIODARONE HYDROCHLORIDE 200 MG/1
200 TABLET ORAL DAILY
Qty: 30 TABLET | Refills: 3 | Status: SHIPPED | OUTPATIENT
Start: 2023-02-08

## 2023-01-29 RX ORDER — ASPIRIN 81 MG/1
81 TABLET, CHEWABLE ORAL DAILY
Qty: 30 TABLET | Refills: 3 | Status: SHIPPED | OUTPATIENT
Start: 2023-01-29

## 2023-01-29 RX ADMIN — POTASSIUM CHLORIDE 20 MEQ: 1500 TABLET, EXTENDED RELEASE ORAL at 12:46

## 2023-01-29 RX ADMIN — Medication 1 TABLET: at 08:38

## 2023-01-29 RX ADMIN — LEVOTHYROXINE SODIUM 25 MCG: 0.03 TABLET ORAL at 05:40

## 2023-01-29 RX ADMIN — FUROSEMIDE 20 MG: 20 TABLET ORAL at 08:38

## 2023-01-29 RX ADMIN — POTASSIUM CHLORIDE 10 MEQ: 1500 TABLET, EXTENDED RELEASE ORAL at 08:38

## 2023-01-29 RX ADMIN — SACUBITRIL AND VALSARTAN 1 TABLET: 24; 26 TABLET, FILM COATED ORAL at 08:38

## 2023-01-29 RX ADMIN — AMIODARONE HYDROCHLORIDE 200 MG: 200 TABLET ORAL at 08:38

## 2023-01-29 RX ADMIN — SERTRALINE 25 MG: 25 TABLET, FILM COATED ORAL at 08:38

## 2023-01-29 RX ADMIN — APIXABAN 5 MG: 5 TABLET, FILM COATED ORAL at 08:39

## 2023-01-29 ASSESSMENT — PAIN SCALES - GENERAL
PAINLEVEL_OUTOF10: 0

## 2023-01-29 NOTE — PROGRESS NOTES
Cardiology Progress Note      Patient:  Arturo Ashton  YOB: 1945  MRN: [id number]   Acct: [de-identified]  [facility] Date:  1/23/2023  Primary Cardiologist: Dr. Lizzy Kennedy   Seen by Dr. Kourtney Blunt     Per prior cardiology consult note-  CHIEF COMPLAINT: A fib with RVR, SOB        HPI: This is a pleasant 69 y.o. female presents with SOB that has been present for 1 month. Patient states that it is constant and is made worse with exertion. Patient was seen by Dr. Lizzy Kennedy on Friday and had scheduled DIAMANTE/DCCV for February 1st for A fib with RVR. Patient states she was unable to wait that long. Patient denies any chest pain, palpitations. Patient states that she has not previously had palpitations with her A fib, and her only symptom with A fib is SOB. Patient states that at her appointment on Friday, Dr. Lizzy Kennedy increased her Lopressor and increased her amiodarone. Patient states that this has had no affect on her SOB. Patient reports compliance with her medications. Patient denies any recent Echo after Echo in 2018. Patient denies any history of HTN.  Patient denies any previous MI or CVA    Subjective (Events in last 24 hours):   Pt up in chair   No chest pain or SOB   Pt in agreeance for cath     Discussed LV with her - she understands     Tele SR no ectopy   VSS      1/29/23  Pt in bed - no chest pain or SOB   Denies palpitations     Per nursing staff did have episode of SVT - lasted approx 5 min yesterday -- pt was asymptomatic - self converted to SR\SB hr 50's - none since     VSS  Tele SR since episode       Discussed LV with pt       RT radial cath site - no ecchymosis or hematoma - Pulses present - neurovascular check WNL    Objective:   /75   Pulse 53   Temp 98 °F (36.7 °C) (Oral)   Resp 20   Ht 5' 2\" (1.575 m)   Wt 168 lb (76.2 kg)   SpO2 94%   BMI 30.73 kg/m²        TELEMETRY: SR no ectopy     Physical Exam:  General Appearance: alert and oriented to person, place and time, in no acute distress  Cardiovascular: normal rate, regular rhythm, normal S1 and S2, no murmurs, rubs, clicks, or gallops, distal pulses intact,   Pulmonary/Chest: clear to auscultation bilaterally- few posterior wheezes, no rales or rhonchi, normal air movement, no respiratory distress  Abdomen: soft, non-tender, non-distended, normal bowel sounds, no masses Extremities: no cyanosis, clubbing or edema, pulses present   Musculoskeletal: normal range of motion, no joint swelling, deformity or tenderness  Neurological: alert, oriented, normal speech, no focal findings or movement disorder noted    Medications:    aspirin  81 mg Oral Once    sacubitril-valsartan  1 tablet Oral BID    furosemide  20 mg Oral Daily    potassium chloride  10 mEq Oral Daily with breakfast    levothyroxine  25 mcg Oral Daily    sodium chloride flush  5-40 mL IntraVENous 2 times per day    sodium chloride flush  5-40 mL IntraVENous 2 times per day    amiodarone  200 mg Oral BID    [START ON 2/8/2023] amiodarone  200 mg Oral Daily    apixaban  5 mg Oral BID    metoprolol tartrate  50 mg Oral BID    rosuvastatin  20 mg Oral Nightly    sertraline  25 mg Oral Daily    multivitamin  1 tablet Oral Daily    sodium chloride flush  5-40 mL IntraVENous 2 times per day      sodium chloride      sodium chloride      sodium chloride       sodium chloride flush, 5-40 mL, PRN  sodium chloride, , PRN  acetaminophen, 650 mg, Q4H PRN  sodium chloride flush, 5-40 mL, PRN  sodium chloride, , PRN  nitroGLYCERIN, 0.4 mg, Q5 Min PRN  calcium carbonate, 500 mg, BID PRN  sodium chloride flush, 5-40 mL, PRN  sodium chloride, , PRN  ondansetron, 4 mg, Q8H PRN   Or  ondansetron, 4 mg, Q6H PRN  polyethylene glycol, 17 g, Daily PRN      Diagnostics:    Echo:   Electronically signed by Mauro Lunsford MD (Interpreting   physician) on 01/26/2023 at 02:00 PM   ----------------------------------------------------------------      Findings      Mitral Valve   The mitral valve structure was normal with normal leaflet separation. DOPPLER: The transmitral velocity was within the normal range with no   evidence for mitral stenosis. There was no evidence of mitral   regurgitation. Aortic Valve   The aortic valve was trileaflet with normal thickness and cuspal   separation. DOPPLER: Transaortic velocity was within the normal range with   no evidence of aortic stenosis. There was trace aortic regurgitation. Tricuspid Valve   The tricuspid valve structure was normal with normal leaflet separation. DOPPLER: There was no evidence of tricuspid stenosis. There was moderate   tricuspid regurgitation. Assuming RAP = 15 mmHg, the estimated RVSP = 38   mmHg. Pulmonic Valve   The pulmonic valve leaflets were not well seen. DOPPLER: The transpulmonic   velocity was within the normal range with trace regurgitation. Left Atrium   Left atrial size was severely dilated. Left Ventricle   Normal left ventricular size and severely reduced systolic function. There was severe global hypokinesis. Wall thickness was within normal limits. Ejection fraction was estimated at 25-30%. Right Atrium   Right atrial size was severely dilated. Right Ventricle   The right ventricular size was severely dilated with mildly reduced   systolic function. Pericardial Effusion   The pericardium was normal in appearance with a trace pericardial   effusion. Pleural Effusion   No evidence of pleural effusion. Aorta / Great Vessels   IVC size is dilated with reduced respiratory phasic changes (CVP~10-15   mmHg). Stress: Conclusions      Summary   There were no significant perfusion abnormalities. Myocardial perfusion imaging was normal at rest and with stress. Left ventricular systolic function was mildly reduced. Recommendation   Clinical correlation is recommended. Aggressive risk factor management.       Signatures ----------------------------------------------------------------   Electronically signed by Kleber Bliss MD (Interpreting   Cardiologist) on 01/25/2023      Left Heart Cath:    Procedure Summary   LM-PATENT   LAD PATENT   D2 OSTIAL 50% STENOSIS   LCX-PATENT   RCA- MID 35 TO 40% DIFFUSE LESIONS   Normal LV systolic function. LVEF approximately 60% . EDP 14 mmhg   No MR   NO transaortic Gradient      Recommendations   Continue with aggressive risk factor modification and medical therapy. Estimated Blood Loss:5 ml. Complications:No complications. Signatures      ----------------------------------------------------------------   Electronically signed by Lyudmila Weber MD (Performing   Physician) on 05/09/2018       Lab Data:    Cardiac Enzymes:  No results for input(s): CKTOTAL, CKMB, CKMBINDEX, TROPONINI in the last 72 hours.     CBC:   Lab Results   Component Value Date/Time    WBC 6.1 01/29/2023 05:17 AM    RBC 4.92 01/29/2023 05:17 AM    HGB 13.1 01/29/2023 05:17 AM    HCT 41.1 01/29/2023 05:17 AM     01/29/2023 05:17 AM       CMP:    Lab Results   Component Value Date/Time     01/29/2023 05:17 AM    K 3.2 01/29/2023 05:17 AM    K 3.9 05/09/2018 09:15 AM     01/29/2023 05:17 AM    CO2 29 01/29/2023 05:17 AM    BUN 12 01/29/2023 05:17 AM    CREATININE 1.0 01/29/2023 05:17 AM    LABGLOM 58 01/29/2023 05:17 AM    GLUCOSE 86 01/29/2023 05:17 AM    CALCIUM 8.4 01/29/2023 05:17 AM       Hepatic Function Panel:    Lab Results   Component Value Date/Time    ALKPHOS 153 01/23/2023 09:30 AM    ALT 34 01/23/2023 09:30 AM    AST 27 01/23/2023 09:30 AM    PROT 5.9 01/23/2023 09:30 AM    BILITOT 0.8 01/23/2023 09:30 AM    BILIDIR <0.2 01/23/2023 09:30 AM    LABALBU 3.6 01/23/2023 09:30 AM       Magnesium:    Lab Results   Component Value Date/Time    MG 1.9 01/28/2023 02:53 PM       PT/INR:    Lab Results   Component Value Date/Time    INR 1.93 01/27/2023 05:40 AM       HgBA1c:    Lab Results Component Value Date/Time    LABA1C 5.7 01/27/2023 05:40 AM       FLP:    Lab Results   Component Value Date/Time    TRIG 70 01/27/2023 05:40 AM    HDL 31 01/27/2023 05:40 AM    LDLCALC 44 01/27/2023 05:40 AM    LDLDIRECT 168.41 12/10/2021 08:35 AM       TSH:    Lab Results   Component Value Date/Time    TSH 7.310 01/23/2023 09:30 AM         Assessment:    Sob -- resolved    Acute systolic chf [address] 3- resolved     PAFB RVR - SR / SB maintained with po amio   - on eliquis    PSVT (likely AFB RVR)    Sinus bradycardia     New NICDMP EF dropped to 25-30% from 60%      Sp cardiac cath 1/28/23- nonobstructive CAD       HLP   LD 44      Plan:  [id number] [address] for DC   Follow with CHF clinic and cardiology as directed         CHF guidelines:  BB: no - SB HR 40-50    on amio   ACE / ARB/ entresto: yes  Diuretics: yes  Aldactone: no - lower BP (just started entresto)  Farxiga: check with insurance  start jardiance   Veroquvo candidate:  no           Electronically signed by FELICITAS Clifford CNP on 1/29/2023 at 8:19 AM

## 2023-01-29 NOTE — DISCHARGE INSTRUCTIONS
Follow with CHF clinic 1-2 weeks   NICDMP EF 25%   Follow with Dr. Pastor Medina 3-5 weeks     Salt restriction 2 gm ( 2,000 mg) daily   Fluid restriction 2 L daily    Weigh yourself daily: Should you gain 2-3 pounds in 1 days time or 3-5 pounds in 2 days time-- call our office ([phone]) for further recommendations        Discharge Instructions for Radial Heart Catherization    1. Take it easy for 3-4 days. 2.  No driving for 2 days. 3.  No lifting of 5 lbs or more for 5 days with the affected arm. 4.  Can shower after 24 hours. 5.  Remove arm board after 24 hours. 6.  Apply a band aid to the insertion site daily for 5 days. Wash site daily with soap and water. 7.  No creams, ointments, or powders near the insertion site. 8.  No tub baths, swimming, hot tubs, or hand washing dishes for 1 week. 9.  Watch for signs of infection (redness, warmth, swelling, or pus drainage) or coolness of extremity and call physician if this occurs  10. If bleeding occurs from insertion site, apply pressure and call 911.      Watch for numbness/tingling - if this occurs go to ER ASAP  Apply ice 15min with hour break in between and alternate with heat compress for 15 min to reduce swelling for 3-5 days

## 2023-01-29 NOTE — DISCHARGE SUMMARY
Discharge Summary    Lizette Kraus  :  1945  MRN:  [id number]    Admit date:  2023  Discharge date:      Admitting Physician:  Shun Barrera MD    Discharge Diagnoses:     Atrial Fibrillation with RVR on OAC and rate control    Nonobstructive CAD    Acute systolic CHF EF 14-89%   Hyperlipidemia    Depression    Obesity  Hypothyroidism  Hypokalemia,      Patient Active Problem List   Diagnosis    Pure hypercholesterolemia    Adenomatous polyp of colon    Vitamin D deficiency    Osteopenia    Palpitations    Paroxysmal atrial fibrillation ([address]    History of cardiac catheterization    BPV (benign positional vertigo), right    Dyslipidemia    Coronary artery disease involving native coronary artery of native heart without angina pectoris    Postmenopausal state    SOB (shortness of breath) on exertion    S/P cardiac cath 2018 - nonobstructive lesion    Atrial fibrillation with rapid ventricular response (HCC)    Atrial fibrillation with RVR (HCC)    Longstanding persistent atrial fibrillation (HCC)    Acute systolic congestive heart failure ([address]       Admission Condition:  serious  Discharged Condition:  good    Hospital Course:   Patient presented with SOB. She was found to be in a fib with RVR. Further w/up revealed new systolic cardiomyopathy with reduced EF 25-30 %. She had LHC-non obstructive CAD. She was started on BB,amiodarone, eliquis, entresto, lasix. She remained stable. Home today with life vest. Cardiology follow upas OP.     Discharge Medications:         Medication List        START taking these medications      aspirin 81 MG chewable tablet  Take 1 tablet by mouth once for 1 dose     empagliflozin 10 MG tablet  Commonly known as: JARDIANCE  Take 1 tablet by mouth daily     furosemide 20 MG tablet  Commonly known as: LASIX  Take 1 tablet by mouth daily     levothyroxine 25 MCG tablet  Commonly known as: SYNTHROID  Take 1 tablet by mouth Daily  Start taking on:  2023     potassium chloride 10 MEQ extended release tablet  Commonly known as: KLOR-CON M  Take 2 tablets by mouth daily     sacubitril-valsartan 24-26 MG per tablet  Commonly known as: ENTRESTO  Take 1 tablet by mouth 2 times daily            CHANGE how you take these medications      * amiodarone 200 MG tablet  Commonly known as: CORDARONE  Take 1 tablet by mouth 2 times daily for 20 doses  What changed:   medication strength  how much to take     * amiodarone 200 MG tablet  Commonly known as: CORDARONE  Take 1 tablet by mouth daily  Start taking on: February 8, 2023  What changed: You were already taking a medication with the same name, and this prescription was added. Make sure you understand how and when to take each. * This list has 2 medication(s) that are the same as other medications prescribed for you. Read the directions carefully, and ask your doctor or other care provider to review them with you. CONTINUE taking these medications      apixaban 5 MG Tabs tablet  Commonly known as: Eliquis  TAKE 1 TABLET TWICE A DAY     betamethasone valerate 0.1 % cream  Commonly known as: VALISONE  Apply topically 2 times daily.      CALCIUM 600 PO     rosuvastatin 20 MG tablet  Commonly known as: CRESTOR  TAKE 1 TABLET DAILY     sertraline 25 MG tablet  Commonly known as: ZOLOFT  Take 1 tablet by mouth daily     therapeutic multivitamin-minerals tablet     vitamin D3 125 MCG (5000 UT) Caps  Take 1 capsule by mouth See Admin Instructions 3 times a week            STOP taking these medications      metoprolol tartrate 50 MG tablet  Commonly known as: Lopressor               Where to Get Your Medications        These medications were sent to [id number] - [place] [place] - [address] [place] [address],[address]  [address] [place] [address]      Phone: [phone]   amiodarone 200 MG tablet  amiodarone 200 MG tablet  aspirin 81 MG chewable tablet  empagliflozin 10 MG tablet  furosemide 20 MG tablet  levothyroxine 25 MCG tablet  potassium chloride 10 MEQ extended release tablet  sacubitril-valsartan 24-26 MG per tablet  vitamin D3 125 MCG (5000 UT) Caps         Consults:  cardiology    Significant Diagnostic Studies: labs: CBC:        Recent Labs     01/27/23  0540 01/29/23 0517   WBC 7.3 6.1   HGB 12.3 13.1    282         BMP:          Recent Labs     01/27/23  0540 01/28/23  1453 01/29/23 0517    142 142   K 3.7 4.0 3.2*    104 102   CO2 26 27 29   BUN 19 17 12   CREATININE 1.1 1.0 1.0   GLUCOSE 96 112* 86            Lipids:       Recent Labs     01/27/23 0540   CHOL 89*   HDL 31         INR:       Recent Labs     01/27/23 0540   INR 1.93*            Magnesium:          Lab Results   Component Value Date/Time     MG 1.9 01/28/2023 02:53 PM         HgBA1c:          Lab Results   Component Value Date/Time     LABA1C 5.7 01/27/2023 05:40 AM      TSH:          Lab Results   Component Value Date/Time     TSH 7.310 01/23/2023 09:30 AM      ECHO Summary   Normal left ventricular size and severely reduced systolic function. There was severe global hypokinesis. Wall thickness was within normal limits. Ejection fraction was estimated at 25-30%. Left atrial size was severely dilated. Right atrial size was severely dilated. The right ventricular size was severely dilated with mildly reduced   systolic function. There was trace aortic regurgitation. There was moderate tricuspid regurgitation. Assuming RAP = 15 mmHg, the estimated RVSP = 38 mmHg. The pericardium was normal in appearance with a trace pericardial   effusion. IVC size is dilated with reduced respiratory phasic changes (CVP~10-15   mmHg).       Signature   ----------------------------------------------------------------   Electronically signed by Hieu King MD (Interpreting   physician) on 01/26/2023 at 02:00 PM     Assessment and Plan:    Atrial Fibrillation with RVR on 4 [facility] and rate control    Nonobstructive CAD    Acute systolic CHF EF 08-77%   Hyperlipidemia    Depression    Obesity  Hypothyroidism  Hypokalemia, replaced with oral K            Treatments:     BB, eliquis, amiodarone  Entresto  Lasix 20 mg daily, oral K  Cont synthroid  Life vest at [place]. Cardiology f/up as OP. Stable for [place]. F/up cardiology clinic    Disposition:   home .     Signed:  Leon Sanabria MD  1/29/2023, 12:07 PM

## 2023-01-29 NOTE — PROGRESS NOTES
Heart attack teaching covered with patient and/or family or significant other:  Signs and symptoms of a heart attack. When to call 911 and the importance of calling 911. Personal risk factors and ways to lower their risk. 4.   Importance of quitting smoking if applicable. Heart attack booklet given to the patient and/or family or significant other. Reviewed: How to take Nitroglycerin. The importance of participating in Cardiac Rehab and hours of operation. Heart Healthy Diet. Risk factor modification. (Overweight, Obesity, Diabetes, Hypertension, Smoking, High Cholesterol, Stress)  Discharge instructions for Cath/Intervention procedure site if applicable. Discharge teaching gone over with patient and son. All questions answered.

## 2023-01-29 NOTE — PROGRESS NOTES
INTERNAL MEDICINE Progress Note  1/29/2023 12:02 PM    IM coverage for IMS/ Dr Luis Felipe Stockton    Subjective:   Admit Date: 1/23/2023  PCP: Ezio Heller MD  Interval History:      No new c/o  Echo with reduced EF 25%  LHC -non obst dx    Objective:   Vitals: /65   Pulse 58   Temp 98 °F (36.7 °C) (Oral)   Resp 18   Ht 5' 2\" (1.575 m)   Wt 168 lb (76.2 kg)   SpO2 94%   BMI 30.73 kg/m²   General appearance: alert and cooperative with exam  HEENT: Head: atraumatic  Neck: no adenopathy, no carotid bruit, and no JVD  Lungs: clear to auscultation bilaterally  Heart: irregularly irregular rhythm and S1, S2 normal  Abdomen: soft, non-tender; bowel sounds normal; no masses,  no organomegaly  Extremities: no edema, redness or tenderness in the calves or thighs  Neurologic: Mental status: Alert, oriented, thought content appropriate      Medications:   Scheduled Meds:   empagliflozin  10 mg Oral Daily    aspirin  81 mg Oral Once    sacubitril-valsartan  1 tablet Oral BID    furosemide  20 mg Oral Daily    potassium chloride  10 mEq Oral Daily with breakfast    levothyroxine  25 mcg Oral Daily    sodium chloride flush  5-40 mL IntraVENous 2 times per day    sodium chloride flush  5-40 mL IntraVENous 2 times per day    amiodarone  200 mg Oral BID    [START ON 2/8/2023] amiodarone  200 mg Oral Daily    apixaban  5 mg Oral BID    rosuvastatin  20 mg Oral Nightly    sertraline  25 mg Oral Daily    multivitamin  1 tablet Oral Daily    sodium chloride flush  5-40 mL IntraVENous 2 times per day     Continuous Infusions:   sodium chloride      sodium chloride      sodium chloride         Lab Results:   CBC:   Recent Labs     01/27/23  0540 01/29/23  0517   WBC 7.3 6.1   HGB 12.3 13.1    282       BMP:    Recent Labs     01/27/23  0540 01/28/23  1453 01/29/23  0517    142 142   K 3.7 4.0 3.2*    104 102   CO2 26 27 29   BUN 19 17 12   CREATININE 1.1 1.0 1.0   GLUCOSE 96 112* 86         Lipids:   Recent Labs     01/27/23  0540   CHOL 89*   HDL 31       INR:   Recent Labs     01/27/23  0540   INR 1.93*         Magnesium:    Lab Results   Component Value Date/Time    MG 1.9 01/28/2023 02:53 PM       HgBA1c:    Lab Results   Component Value Date/Time    LABA1C 5.7 01/27/2023 05:40 AM     TSH:    Lab Results   Component Value Date/Time    TSH 7.310 01/23/2023 09:30 AM     ECHO Summary   Normal left ventricular size and severely reduced systolic function.   There was severe global hypokinesis.   Wall thickness was within normal limits.   Ejection fraction was estimated at 25-30%.   Left atrial size was severely dilated.   Right atrial size was severely dilated.   The right ventricular size was severely dilated with mildly reduced   systolic function.   There was trace aortic regurgitation.   There was moderate tricuspid regurgitation.   Assuming RAP = 15 mmHg, the estimated RVSP = 38 mmHg.   The pericardium was normal in appearance with a trace pericardial   effusion.   IVC size is dilated with reduced respiratory phasic changes (CVP~10-15   mmHg).      Signature   ----------------------------------------------------------------   Electronically signed by Jerson Avilez MD (Interpreting   physician) on 01/26/2023 at 02:00 PM    Assessment and Plan:    Atrial Fibrillation with RVR on OAC and rate control    Nonobstructive CAD    Acute systolic CHF EF 25-30%   Hyperlipidemia    Depression    Obesity  Hypothyroidism  Hypokalemia, replaced with oral K      Plan   BB, eliquis  Entresto  Lasix 20 mg daily, oral K  Cont synthroid  Life vest at [place]. Cardiology f/up as OP.  Stable for [place]. F/up cardiology clinic    Ton Cadena MD, MD

## 2023-01-30 ENCOUNTER — CARE COORDINATION (OUTPATIENT)
Dept: FAMILY MEDICINE CLINIC | Age: 78
End: 2023-01-30

## 2023-01-30 RX ORDER — FUROSEMIDE 20 MG/1
20 TABLET ORAL DAILY
Qty: 90 TABLET | Refills: 0 | Status: SHIPPED | OUTPATIENT
Start: 2023-01-30

## 2023-01-30 NOTE — CARE COORDINATION
Care Transitions Initial Follow Up Call    Call within 2 business days of discharge: Yes     Patient: Juancarlos Boyd Patient : 1945 MRN: [id number]    Last Discharge  Street       Date Complaint Diagnosis Description Type Department Provider    23 Atrial Fibrillation; Shortness of Breath Longstanding persistent atrial fibrillation ([facility] [address] . .. ED to Hosp-Admission (Discharged) (ADMITTED) Edmundo Clark MD;  ... RARS: Readmission Risk Score: 9.8       Spoke with: Keke    Discharge department/facility:  [facility]    Non-face-to-face services provided:  Scheduled appointment with PCP-no,she will be in [facility] at her son's. Scheduled appointment with Specialist-yes  Obtained and reviewed discharge summary and/or continuity of care documents  Reviewed and followed up on pending diagnostic tests and treatments-yes  Communication with home health agencies or other community services the patient is currently using-n/a  Communication with specialists who will assume or re-assume care of the patient's system-specific problems-yes  Education of patient/family/caregiver/guardian to support self-management-yes  Assessment and support for treatment adherence and medication management-yes  Establishment or re-establishment of referrals-yes  Assistance in accessing community resources-n/a    Follow Up  Future Appointments   Date Time Provider Netta Champagne   2023  9:30 AM MD [facility]   2023  2:15 PM MD [facility]   2024 10:50 AM Romina Nava MD [facility] [address]   She is awaiting a call about getting the life vest. Then she will be at her son's so she did not make a follow up. She does have an appointment at the end of the month. She was able to reconcile her medications and I will follow up with her next week.     Charisse Ochoa RN

## 2023-02-03 ENCOUNTER — TELEPHONE (OUTPATIENT)
Dept: CARDIOLOGY CLINIC | Age: 78
End: 2023-02-03

## 2023-02-03 NOTE — TELEPHONE ENCOUNTER
Was able to download strips with Dilma Chaparro    AF RVR, pt knows if she feels poorly go to ED over the weekend. In the meantime, will share EGM's with Dr Lucy Arevalo   Pt states she was having AF RVR as inpt \"came and went\"    Is still taking amiodarone 200mg bid, plans are to eventually taper to 200mg daily.   No BB ~pt states was stopped when she was an inpt

## 2023-02-03 NOTE — TELEPHONE ENCOUNTER
Pt daughter lmovm that her moms life vest was going off, and they were told to send a download. Waiting on download to come. Reached out to [employer] will check in to it.
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
## 2023-02-06 ENCOUNTER — OFFICE VISIT (OUTPATIENT)
Dept: CARDIOLOGY CLINIC | Age: 78
End: 2023-02-06
Payer: MEDICARE

## 2023-02-06 ENCOUNTER — TELEPHONE (OUTPATIENT)
Dept: CARDIOLOGY CLINIC | Age: 78
End: 2023-02-06

## 2023-02-06 VITALS
SYSTOLIC BLOOD PRESSURE: 124 MMHG | DIASTOLIC BLOOD PRESSURE: 78 MMHG | HEART RATE: 81 BPM | WEIGHT: 146 LBS | BODY MASS INDEX: 26.7 KG/M2 | OXYGEN SATURATION: 95 %

## 2023-02-06 DIAGNOSIS — I48.0 PAROXYSMAL ATRIAL FIBRILLATION (HCC): ICD-10-CM

## 2023-02-06 DIAGNOSIS — I42.8 NONISCHEMIC CARDIOMYOPATHY (HCC): ICD-10-CM

## 2023-02-06 DIAGNOSIS — I50.22 CHF NYHA CLASS II, CHRONIC, SYSTOLIC (HCC): Primary | ICD-10-CM

## 2023-02-06 LAB
ANION GAP SERPL CALC-SCNC: 13 MEQ/L (ref 8–16)
BUN SERPL-MCNC: 17 MG/DL (ref 7–22)
CALCIUM SERPL-MCNC: 10.2 MG/DL (ref 8.5–10.5)
CHLORIDE SERPL-SCNC: 102 MEQ/L (ref 98–111)
CO2 SERPL-SCNC: 24 MEQ/L (ref 23–33)
CREAT SERPL-MCNC: 1 MG/DL (ref 0.4–1.2)
GFR SERPL CREATININE-BSD FRML MDRD: 58 ML/MIN/1.73M2
GLUCOSE SERPL-MCNC: 85 MG/DL (ref 70–108)
MAGNESIUM SERPL-MCNC: 2.4 MG/DL (ref 1.6–2.4)
NT-PROBNP SERPL IA-MCNC: 701.7 PG/ML (ref 0–449)
POTASSIUM SERPL-SCNC: 4.3 MEQ/L (ref 3.5–5.2)
SODIUM SERPL-SCNC: 139 MEQ/L (ref 135–145)

## 2023-02-06 PROCEDURE — G8484 FLU IMMUNIZE NO ADMIN: HCPCS | Performed by: NURSE PRACTITIONER

## 2023-02-06 PROCEDURE — 1036F TOBACCO NON-USER: CPT | Performed by: NURSE PRACTITIONER

## 2023-02-06 PROCEDURE — G8427 DOCREV CUR MEDS BY ELIG CLIN: HCPCS | Performed by: NURSE PRACTITIONER

## 2023-02-06 PROCEDURE — G8399 PT W/DXA RESULTS DOCUMENT: HCPCS | Performed by: NURSE PRACTITIONER

## 2023-02-06 PROCEDURE — 99215 OFFICE O/P EST HI 40 MIN: CPT | Performed by: NURSE PRACTITIONER

## 2023-02-06 PROCEDURE — 1123F ACP DISCUSS/DSCN MKR DOCD: CPT | Performed by: NURSE PRACTITIONER

## 2023-02-06 PROCEDURE — 36415 COLL VENOUS BLD VENIPUNCTURE: CPT | Performed by: NURSE PRACTITIONER

## 2023-02-06 PROCEDURE — 1090F PRES/ABSN URINE INCON ASSESS: CPT | Performed by: NURSE PRACTITIONER

## 2023-02-06 PROCEDURE — G8417 CALC BMI ABV UP PARAM F/U: HCPCS | Performed by: NURSE PRACTITIONER

## 2023-02-06 PROCEDURE — 1111F DSCHRG MED/CURRENT MED MERGE: CPT | Performed by: NURSE PRACTITIONER

## 2023-02-06 ASSESSMENT — ENCOUNTER SYMPTOMS
SHORTNESS OF BREATH: 0
ABDOMINAL DISTENTION: 0
COUGH: 0

## 2023-02-06 NOTE — TELEPHONE ENCOUNTER
Labs reviewed - look good   Everything WNL  BNP much improved. Will keep everything same for now. If starts to feel dry - dehydrated let us know will back off on Lasix.

## 2023-02-06 NOTE — PROGRESS NOTES
Venipuncture obtained from [facility]. Patient tolerated procedure without complications or complaints.

## 2023-02-06 NOTE — PATIENT INSTRUCTIONS
You may receive a survey regarding the care you received during your visit. Your input is valuable to us. We encourage you to complete and return your survey. We hope you will choose us in the future for your healthcare needs.     Continue:  Continue current medications  Daily weights and record  Fluid restriction of 2 Liters per day  Limit sodium in diet to around 0803-0544 mg/day  Monitor BP  Activity as tolerated     Call the [facility] for any of the following symptoms: [phone]  Weight gain of 2-3 pounds in 1 day or 5 pounds in 1 week  Increased shortness of breath  Shortness of breath while laying down  Cough  Chest pain  Swelling in feet, ankles or legs  Tenderness or bloating in the abdomen  Fatigue   Decreased appetite or nausea   Confusion

## 2023-02-06 NOTE — PROGRESS NOTES
[facility] Failure [facility]       Visit Date: 2/6/2023  Cardiologist:  Dr. Selin Samson  Primary Care Physician: Dr. Ab Payne MD  Referred by: hospital    Rubio Bowman is a 69 y.o. female who presents today for:  Chief Complaint   Patient presents with    Congestive Heart Failure     New Patient          HPI:   Rubio Bowman is a 69 y.o. female who presents to the office for a NEW patient visit in the heart failure clinic. Accompanied by no one  Lives home alone    TYPE HF: HFrEF (25-30%), RVSP 38 mmHg, LAE, CLARI, mild RV dysfunction  Valves:  mod TR   Cause: NICM  Device: lifevest  HX: HLD, Afib (Eliquis)    Dry Wt:  ? Hospitalization:  1/2023 - SOB. Afib RVR. New CMP, EF 25-30%. Cath - nonobstructive CAD. No BB at d/c d/t sinus susy, 40-50    Recent issues w/ Lifevest alarm (3 days ago) - strips obtained showing Afib RVR  Today: DOWN 20# since discharge, 8 days ago. Denies palpitations, no issues since Friday. Feels relatively back to baseline. Has had more on/off fatigue/SOB since having Bronchitis last fall. Janeth been trying to adjust meds - started on Amio but ended up in hospital shortly after.       Patient has:  Chest Pain: no  SOB: better  Orthopnea/PND: no  JULIA: denies symptoms   Edema: no  Fatigue: no  Abdominal bloating: no  Cough: no  Appetite: good  Home weight: stable - down 20#   Home blood pressure: stable    Past Medical History:   Diagnosis Date    Acute systolic congestive heart failure ([address] 1/29/2023    Adenomatous colon polyp 2006    Coronary artery disease involving native coronary artery of native heart without angina pectoris 5/6/2020    Fibrocystic breast disease 1990    Hyperlipidemia 1990    MVP (mitral valve prolapse) 1997    Osteopenia 02/2018    PONV (postoperative nausea and vomiting)     Vitamin D deficiency 03/2018     Past Surgical History:   Procedure Laterality Date    COLONOSCOPY  2011    [facility]    COLONOSCOPY  2006    [facility]    COLONOSCOPY 2017    Dr. Alton Cedillo (CERVIX STATUS UNKNOWN)      SHAMA AND BSO (CERVIX REMOVED)      TONSILLECTOMY       Family History   Problem Relation Age of Onset    Stroke Mother     Heart Disease Mother     Cancer Sister     Breast Cancer Maternal Aunt 64     Social History     Tobacco Use    Smoking status: Former     Packs/day: 0.50     Years: 4.00     Pack years: 2.00     Types: Cigarettes     Quit date:      Years since quittin.1    Smokeless tobacco: Never   Substance Use Topics    Alcohol use: Yes     Comment: occsional 2-3 times a month     Current Outpatient Medications   Medication Sig Dispense Refill    metoprolol tartrate (LOPRESSOR) 25 MG tablet Take 1 tablet by mouth 2 times daily 60 tablet 0    furosemide (LASIX) 20 MG tablet TAKE 1 TABLET BY MOUTH DAILY 90 tablet 0    amiodarone (CORDARONE) 200 MG tablet Take 1 tablet by mouth 2 times daily for 20 doses 20 tablet 0    [START ON 2023] amiodarone (CORDARONE) 200 MG tablet Take 1 tablet by mouth daily 30 tablet 3    empagliflozin (JARDIANCE) 10 MG tablet Take 1 tablet by mouth daily 30 tablet 3    sacubitril-valsartan (ENTRESTO) 24-26 MG per tablet Take 1 tablet by mouth 2 times daily 60 tablet 3    Cholecalciferol (VITAMIN D3) 125 MCG (5000 UT) CAPS Take 1 capsule by mouth See Admin Instructions 3 times a week 30 capsule 1    levothyroxine (SYNTHROID) 25 MCG tablet Take 1 tablet by mouth Daily 30 tablet 3    potassium chloride (KLOR-CON M) 10 MEQ extended release tablet Take 2 tablets by mouth daily 60 tablet 3    aspirin 81 MG chewable tablet Take 1 tablet by mouth daily 30 tablet 3    apixaban (ELIQUIS) 5 MG TABS tablet TAKE 1 TABLET TWICE A DAY 60 tablet 0    sertraline (ZOLOFT) 25 MG tablet Take 1 tablet by mouth daily 90 tablet 3    rosuvastatin (CRESTOR) 20 MG tablet TAKE 1 TABLET DAILY 90 tablet 3    Calcium Carbonate (CALCIUM 600 PO) Take by mouth      Multiple Vitamins-Minerals (THERAPEUTIC MULTIVITAMIN-MINERALS) tablet Take 1 tablet by mouth daily      betamethasone valerate (VALISONE) 0.1 % cream Apply topically 2 times daily. 15 g 0     No current facility-administered medications for this visit. No Known Allergies    SUBJECTIVE:   Review of Systems   Constitutional:  Negative for activity change, appetite change and fatigue. Respiratory:  Negative for cough and shortness of breath. Cardiovascular:  Negative for chest pain, palpitations and leg swelling. Gastrointestinal:  Negative for abdominal distention. Neurological:  Negative for weakness, light-headedness and headaches. Hematological:  Negative for adenopathy. Psychiatric/Behavioral:  Negative for sleep disturbance. OBJECTIVE:   Today's Vitals:  /78   Pulse 81   Wt 146 lb (66.2 kg)   SpO2 95%   BMI 26.70 kg/m²     Physical Exam  Vitals reviewed. Constitutional:       General: She is not in acute distress. Appearance: Normal appearance. She is well-developed. She is not diaphoretic. HENT:      Head: Normocephalic and atraumatic. Eyes:      Conjunctiva/sclera: Conjunctivae normal.   Neck:      Comments: No JVD  Cardiovascular:      Rate and Rhythm: Normal rate and regular rhythm. Heart sounds: Normal heart sounds. No murmur heard. Comments: Lifevest    Pulmonary:      Effort: Pulmonary effort is normal. No respiratory distress. Breath sounds: Normal breath sounds. No wheezing or rales. Abdominal:      General: Bowel sounds are normal. There is no distension. Palpations: Abdomen is soft. Tenderness: There is no abdominal tenderness. Musculoskeletal:         General: Normal range of motion. Cervical back: Normal range of motion and neck supple. Right lower leg: No edema. Left lower leg: No edema. Skin:     General: Skin is warm and dry. Capillary Refill: Capillary refill takes less than 2 seconds.    Neurological:      Mental Status: She is alert and oriented to person, place, and time. Coordination: Coordination normal.   Psychiatric:         Behavior: Behavior normal.     Wt Readings from Last 3 Encounters:   02/06/23 146 lb (66.2 kg)   01/23/23 168 lb (76.2 kg)   01/20/23 168 lb (76.2 kg)     BP Readings from Last 3 Encounters:   02/06/23 124/78   01/29/23 130/65   01/20/23 134/86     Pulse Readings from Last 3 Encounters:   02/06/23 81   01/29/23 58   01/20/23 (!) 137     Body mass index is 26.7 kg/m². ECHO:    Summary   Normal left ventricular size and severely reduced systolic function. There was severe global hypokinesis. Wall thickness was within normal limits. Ejection fraction was estimated at 25-30%. Left atrial size was severely dilated. Right atrial size was severely dilated. The right ventricular size was severely dilated with mildly reduced   systolic function. There was trace aortic regurgitation. There was moderate tricuspid regurgitation. Assuming RAP = 15 mmHg, the estimated RVSP = 38 mmHg. The pericardium was normal in appearance with a trace pericardial   effusion. IVC size is dilated with reduced respiratory phasic changes (CVP~10-15   mmHg). Signature      ----------------------------------------------------------------   Electronically signed by Renetta Lopez MD (Interpreting   physician) on 01/26/2023 at 02:00 PM    2018 - EF 50%      CATH  HEMODYNAMICS:  LVEDP 28 mmHg. No significant pressure gradient across the aortic valve  upon pullback. CORONARY ANGIOGRAM:  1. Right coronary artery, dominant vessel, proximal segment is patent,  mid segment has 10 to 20% stenosis, distal segment is patent, gives rise  to PLB and PDA, both are tortuous but patent without significant  stenotic lesions. 2.  Left main coronary artery, patent, gives rise to left circumflex and  left anterior descending artery.   3.  Left circumflex artery, proximal segment is patent, OM1 is tortuous  but patent with mild diffuse disease, distal LCX has mild diffuse  disease. 4.  Left anterior descending artery, proximal segment is patent, mid  segment has mild diffuse disease, D1 is patent, D2 is patent, distal LAD  is tortuous with mild diffuse disease. IMPRESSION:  Nonobstructive coronary artery disease. RECOMMENDATIONS:  Medical management. Lillian Osei MD     D: 01/28/2023 11:59:41           Results reviewed:  BNP: No results found for: BNP  CBC:   Lab Results   Component Value Date/Time    WBC 6.1 01/29/2023 05:17 AM    RBC 4.92 01/29/2023 05:17 AM    HGB 13.1 01/29/2023 05:17 AM    HCT 41.1 01/29/2023 05:17 AM     01/29/2023 05:17 AM     CMP:    Lab Results   Component Value Date/Time     01/29/2023 05:17 AM    K 3.2 01/29/2023 05:17 AM    K 3.9 05/09/2018 09:15 AM     01/29/2023 05:17 AM    CO2 29 01/29/2023 05:17 AM    BUN 12 01/29/2023 05:17 AM    CREATININE 1.0 01/29/2023 05:17 AM    LABGLOM 58 01/29/2023 05:17 AM    GLUCOSE 86 01/29/2023 05:17 AM    CALCIUM 8.4 01/29/2023 05:17 AM     Hepatic Function Panel:    Lab Results   Component Value Date/Time    ALKPHOS 153 01/23/2023 09:30 AM    ALT 34 01/23/2023 09:30 AM    AST 27 01/23/2023 09:30 AM    PROT 5.9 01/23/2023 09:30 AM    BILITOT 0.8 01/23/2023 09:30 AM    BILIDIR <0.2 01/23/2023 09:30 AM    LABALBU 3.6 01/23/2023 09:30 AM     Magnesium:    Lab Results   Component Value Date/Time    MG 1.9 01/28/2023 02:53 PM     PT/INR:    Lab Results   Component Value Date/Time    INR 1.93 01/27/2023 05:40 AM     Lipids:    Lab Results   Component Value Date/Time    TRIG 70 01/27/2023 05:40 AM    HDL 31 01/27/2023 05:40 AM    LDLCALC 44 01/27/2023 05:40 AM    LDLDIRECT 168.41 12/10/2021 08:35 AM       ASSESSMENT AND PLAN:      Diagnosis Orders   1. CHF NYHA class II, chronic, systolic (HCC)  Basic Metabolic Panel    Magnesium    Brain Natriuretic Peptide    Echo 2D w doppler w color complete      2.  Nonischemic cardiomyopathy (HCC)        3. Paroxysmal atrial fibrillation (HCC)          Continue:  GDMT:   ACE/ARB/ARNI - Entresto 24/26 BID   BB - Lopressor stopped d/t SB - resume 25 BID   SGLT2 -  Jardiance 10/day  AA - no  Diuretic - Lasix 20/day, Kcl 20/day  Vasodilator - no  Other - Amio, Eliquis    HFrEF (25-30%), NYHA II  NICM - ?? tachy induced  Afib - RVR issues recently. Discussed w/     Stable, appears Euvolemic  20# wt loss in less 2 weeks   Lab reviewed - stable  Repeat blood work today - BMP, BNP, Mg  BP/HR stable   Continue diet/fluid adherence  Continue daily wts. Discussed w/ Dr Pedro Rai - ok w/ ECHo in 6 wks, ok start low dose Metoprolol. Will check Lifevest later this week. ECHO in 6 weeks   F/U w/ Cardiology/Janeth this Wednesday   F/U in clinic in 6 weeks    Tolerating above noted HF meds, no ill side effects noted. Will continue to monitor kidney function and electrolytes. Will optimize as tolerated. Pt is compliant w/ medications. Total visit time of 40 minutes has been spent with patient on education of symptoms, management, medication, and plan of care; as well as review of chart: labs, ECHO, radiology reports, etc.   I personally spent more then 50% of the appt time face to face with the patient. Daily weights  Fluid restriction of 2 Liters per day  Limit sodium in diet to around 3199-0482 mg/day  Monitor BP  Activity as tolerated     Patient was instructed to call the [address] for any changes in symptoms as noted in AVS.      No follow-ups on file. or sooner if needed     Patient given educational materials - see patient instructions. We discussed the importance of weighing oneself and recording daily. We also discussed the importance of a low sodium diet, higher sodium foods to avoid and better low sodium food options. Patient verbalizes understanding of plan of care using teach back method, and is agreeable to the treatment plan.        Electronically signed by FELICITAS Sellers CNP on 2/6/2023 at 2:10 PM

## 2023-02-06 NOTE — TELEPHONE ENCOUNTER
Recurrence of atr fib with RVR  Going in and out of atr fib in hospital isnus susy with atr fib rvr in hospital  Need office eval asap

## 2023-02-07 ENCOUNTER — CARE COORDINATION (OUTPATIENT)
Dept: FAMILY MEDICINE CLINIC | Age: 78
End: 2023-02-07

## 2023-02-07 NOTE — CARE COORDINATION
Cortney Newburg is feeling good. She had an episode with her life vest last Friday and when she went to the clinic yesterday they told her she was in afib. She has been fine since then and she found the [facility] Failure [facility] to be very informative. She has no home needs or questions and I will follow up with her next week.

## 2023-02-09 ENCOUNTER — OFFICE VISIT (OUTPATIENT)
Dept: CARDIOLOGY CLINIC | Age: 78
End: 2023-02-09
Payer: MEDICARE

## 2023-02-09 VITALS
DIASTOLIC BLOOD PRESSURE: 73 MMHG | HEART RATE: 62 BPM | SYSTOLIC BLOOD PRESSURE: 128 MMHG | WEIGHT: 145.6 LBS | BODY MASS INDEX: 26.79 KG/M2 | HEIGHT: 62 IN

## 2023-02-09 DIAGNOSIS — I48.0 PAROXYSMAL ATRIAL FIBRILLATION (HCC): ICD-10-CM

## 2023-02-09 DIAGNOSIS — Z98.890 S/P CARDIAC CATH: ICD-10-CM

## 2023-02-09 DIAGNOSIS — I50.42 CHRONIC COMBINED SYSTOLIC AND DIASTOLIC CONGESTIVE HEART FAILURE (HCC): ICD-10-CM

## 2023-02-09 DIAGNOSIS — I42.8 NONISCHEMIC CARDIOMYOPATHY (HCC): Primary | ICD-10-CM

## 2023-02-09 DIAGNOSIS — E78.00 PURE HYPERCHOLESTEROLEMIA: ICD-10-CM

## 2023-02-09 PROBLEM — I25.10 CORONARY ARTERY DISEASE INVOLVING NATIVE CORONARY ARTERY OF NATIVE HEART WITHOUT ANGINA PECTORIS: Status: RESOLVED | Noted: 2020-05-06 | Resolved: 2023-02-09

## 2023-02-09 PROBLEM — I48.11 LONGSTANDING PERSISTENT ATRIAL FIBRILLATION (HCC): Status: RESOLVED | Noted: 2023-01-24 | Resolved: 2023-02-09

## 2023-02-09 PROCEDURE — 1123F ACP DISCUSS/DSCN MKR DOCD: CPT | Performed by: INTERNAL MEDICINE

## 2023-02-09 PROCEDURE — G8417 CALC BMI ABV UP PARAM F/U: HCPCS | Performed by: INTERNAL MEDICINE

## 2023-02-09 PROCEDURE — 1111F DSCHRG MED/CURRENT MED MERGE: CPT | Performed by: INTERNAL MEDICINE

## 2023-02-09 PROCEDURE — G8484 FLU IMMUNIZE NO ADMIN: HCPCS | Performed by: INTERNAL MEDICINE

## 2023-02-09 PROCEDURE — 99215 OFFICE O/P EST HI 40 MIN: CPT | Performed by: INTERNAL MEDICINE

## 2023-02-09 PROCEDURE — 1036F TOBACCO NON-USER: CPT | Performed by: INTERNAL MEDICINE

## 2023-02-09 PROCEDURE — G8427 DOCREV CUR MEDS BY ELIG CLIN: HCPCS | Performed by: INTERNAL MEDICINE

## 2023-02-09 PROCEDURE — G8399 PT W/DXA RESULTS DOCUMENT: HCPCS | Performed by: INTERNAL MEDICINE

## 2023-02-09 PROCEDURE — 1090F PRES/ABSN URINE INCON ASSESS: CPT | Performed by: INTERNAL MEDICINE

## 2023-02-09 RX ORDER — METOPROLOL SUCCINATE 25 MG/1
25 TABLET, EXTENDED RELEASE ORAL 2 TIMES DAILY
Qty: 180 TABLET | Refills: 2 | Status: SHIPPED | OUTPATIENT
Start: 2023-02-09

## 2023-02-09 NOTE — PROGRESS NOTES
Chief Complaint   Patient presents with    Follow-up    Atrial Fibrillation    Coronary Artery Disease     Originally Patient is here due to abnormal monitor strips. Seen by dr. Benny Ferrari for palpitation and event show some abnormal Rhythm and hence came to see me here -Dr. Benny Ferrari out today  Dr. Donnie Gonzales office called would like patient seen for abnormal monitor strips, notified Dr. Sherrie Pegn out this week would like another physician to address. Appt made for 3-27-18, patient declined she will come Thursday      Post hospital Follow up due to going in and out of A-fib. Went to hospital for worsening of sob and atr fib with RVR 1/23/2023  Went in to NSR on her own before Cardioversion while on amiodarone  Noted to have new low EF 25 to 30%- probably related to [address] Interrogation  2/3/2023  11 am went in to atr fib with  to 170 bpm for 87 sec and resolved  Recurrence of atr fib with RVR  Going in and out of atr fib in hospital isnus susy with atr fib rvr in hospital    EKG done 1-.     Post d/c feel better    Cough better    Sob on exertion - better    Denied cp, palpitation, dizziness or edema    Never had syncope      Patient Active Problem List   Diagnosis    Pure hypercholesterolemia    Adenomatous polyp of colon    Vitamin D deficiency    Osteopenia    Palpitations    Paroxysmal atrial fibrillation (HCC)    History of cardiac catheterization    BPV (benign positional vertigo), right    Dyslipidemia    Postmenopausal state    SOB (shortness of breath) on exertion    S/P cardiac cath 2018 - nonobstructive lesion    Atrial fibrillation with rapid ventricular response (HCC)    Atrial fibrillation with RVR (HCC)    Acute systolic congestive heart failure (HCC)    Nonischemic cardiomyopathy ([facility] [address]    Chronic combined systolic and diastolic congestive heart failure [facility])       Past Surgical History:   Procedure Laterality Date    COLONOSCOPY  2011    [facility]    COLONOSCOPY  2006    [place] COLONOSCOPY  2017    Dr. Quinn Fails (CERVIX STATUS UNKNOWN)      SHAMA AND BSO (CERVIX REMOVED)      TONSILLECTOMY         No Known Allergies     Family History   Problem Relation Age of Onset    Stroke Mother     Heart Disease Mother     Cancer Sister     Breast Cancer Maternal Aunt 64        Social History     Socioeconomic History    Marital status:       Spouse name: Not on file    Number of children: Not on file    Years of education: Not on file    Highest education level: Not on file   Occupational History    Not on file   Tobacco Use    Smoking status: Former     Packs/day: 0.50     Years: 4.00     Pack years: 2.00     Types: Cigarettes     Quit date: 26     Years since quittin.1    Smokeless tobacco: Never   Vaping Use    Vaping Use: Never used   Substance and Sexual Activity    Alcohol use: Yes     Comment: occsional 2-3 times a month    Drug use: No    Sexual activity: Not on file   Other Topics Concern    Not on file   Social History Narrative    Not on file     Social Determinants of Health     Financial Resource Strain: Low Risk     Difficulty of Paying Living Expenses: Not hard at all   Food Insecurity: No Food Insecurity    Worried About Running Out of Food in the Last Year: Never true    Ran Out of Food in the Last Year: Never true   Transportation Needs: Not on file   Physical Activity: Unknown    Days of Exercise per Week: 0 days    Minutes of Exercise per Session: Not on file   Stress: Not on file   Social Connections: Not on file   Intimate Partner Violence: Not on file   Housing Stability: Not on file       Current Outpatient Medications   Medication Sig Dispense Refill    metoprolol succinate (TOPROL XL) 25 MG extended release tablet Take 1 tablet by mouth 2 times daily 180 tablet 2    furosemide (LASIX) 20 MG tablet TAKE 1 TABLET BY MOUTH DAILY 90 tablet 0    amiodarone (CORDARONE) 200 MG tablet Take 1 tablet by mouth daily 30 tablet 3    empagliflozin (JARDIANCE) 10 MG tablet Take 1 tablet by mouth daily 30 tablet 3    sacubitril-valsartan (ENTRESTO) 24-26 MG per tablet Take 1 tablet by mouth 2 times daily 60 tablet 3    Cholecalciferol (VITAMIN D3) 125 MCG (5000 UT) CAPS Take 1 capsule by mouth See Admin Instructions 3 times a week 30 capsule 1    levothyroxine (SYNTHROID) 25 MCG tablet Take 1 tablet by mouth Daily 30 tablet 3    potassium chloride (KLOR-CON M) 10 MEQ extended release tablet Take 2 tablets by mouth daily 60 tablet 3    aspirin 81 MG chewable tablet Take 1 tablet by mouth daily 30 tablet 3    apixaban (ELIQUIS) 5 MG TABS tablet TAKE 1 TABLET TWICE A DAY 60 tablet 0    sertraline (ZOLOFT) 25 MG tablet Take 1 tablet by mouth daily 90 tablet 3    rosuvastatin (CRESTOR) 20 MG tablet TAKE 1 TABLET DAILY 90 tablet 3    Calcium Carbonate (CALCIUM 600 PO) Take by mouth      Multiple Vitamins-Minerals (THERAPEUTIC MULTIVITAMIN-MINERALS) tablet Take 1 tablet by mouth daily      betamethasone valerate (VALISONE) 0.1 % cream Apply topically 2 times daily. 15 g 0     No current facility-administered medications for this visit. Review of Systems -     General ROS: negative  Psychological ROS: negative  Hematological and Lymphatic ROS: No history of blood clots or bleeding disorder. Respiratory ROS: no cough, shortness of breath, or wheezing  Cardiovascular ROS: no chest pain or dyspnea on exertion  Gastrointestinal ROS: negative  Genito-Urinary ROS: no dysuria, trouble voiding, or hematuria  Musculoskeletal ROS: negative  Neurological ROS: no TIA or stroke symptoms  Dermatological ROS: negative      Blood pressure 128/73, pulse 62, height 5' 2\" (1.575 m), weight 145 lb 9.6 oz (66 kg), not currently breastfeeding.         Physical Examination:    General appearance - alert, well appearing, and in no distress  Mental status - alert, oriented to person, place, and time  Neck - supple, no significant adenopathy, no JVD, or carotid bruits  Chest - clear to auscultation, no wheezes, rales or rhonchi, symmetric air entry  Heart - normal rate, regular rhythm, normal S1, S2, no murmurs, rubs, clicks or gallops  Abdomen - soft, nontender, nondistended, no masses or organomegaly  Neurological - alert, oriented, normal speech, no focal findings or movement disorder noted  Musculoskeletal - no joint tenderness, deformity or swelling  Extremities - peripheral pulses normal, no pedal edema, no clubbing or cyanosis  Skin - normal coloration and turgor, no rashes, no suspicious skin lesions noted    Lab  No results for input(s): CKTOTAL, CKMB, CKMBINDEX, TROPONINI in the last 72 hours.   CBC:   Lab Results   Component Value Date/Time    WBC 6.1 01/29/2023 05:17 AM    RBC 4.92 01/29/2023 05:17 AM    HGB 13.1 01/29/2023 05:17 AM    HCT 41.1 01/29/2023 05:17 AM    MCV 83.5 01/29/2023 05:17 AM    MCH 26.6 01/29/2023 05:17 AM    MCHC 31.9 01/29/2023 05:17 AM    RDW 12.8 05/09/2018 09:14 AM     01/29/2023 05:17 AM    MPV 9.9 01/29/2023 05:17 AM     BMP:    Lab Results   Component Value Date/Time     02/06/2023 01:38 PM    K 4.3 02/06/2023 01:38 PM    K 3.9 05/09/2018 09:15 AM     02/06/2023 01:38 PM    CO2 24 02/06/2023 01:38 PM    BUN 17 02/06/2023 01:38 PM    LABALBU 3.6 01/23/2023 09:30 AM    CREATININE 1.0 02/06/2023 01:38 PM    CALCIUM 10.2 02/06/2023 01:38 PM    LABGLOM 58 02/06/2023 01:38 PM    GLUCOSE 85 02/06/2023 01:38 PM     Hepatic Function Panel:    Lab Results   Component Value Date/Time    ALKPHOS 153 01/23/2023 09:30 AM    ALT 34 01/23/2023 09:30 AM    AST 27 01/23/2023 09:30 AM    PROT 5.9 01/23/2023 09:30 AM    BILITOT 0.8 01/23/2023 09:30 AM    BILIDIR <0.2 01/23/2023 09:30 AM    LABALBU 3.6 01/23/2023 09:30 AM     Magnesium:    Lab Results   Component Value Date/Time    MG 2.4 02/06/2023 01:38 PM     Warfarin PT/INR:  No components found for: PTPATWAR, PTINRWAR  HgBA1c:    Lab Results   Component Value Date/Time    LABA1C 5.7 01/27/2023 05:40 AM FLP:    Lab Results   Component Value Date/Time    TRIG 70 01/27/2023 05:40 AM    HDL 31 01/27/2023 05:40 AM    LDLCALC 44 01/27/2023 05:40 AM    LDLDIRECT 168.41 12/10/2021 08:35 AM     TSH:    Lab Results   Component Value Date/Time    TSH 7.310 01/23/2023 09:30 AM       Event Monitor reviewed  Revealed two episodes of atr fib with  to 150's on 03/22  For 2.5 strip and 03/23/2018 and 1 strip  And pat did not have  Any symptom while having it      Conclusions      Summary   Nonspecific ST-T wave changes. No ischemic EKG changes. There is mild attenuation artifact noted in the anterior wall seems to be   related to breast artifact. There was a small to moderate sized, mildly severe, partially reversible   myocardial perfusion defect of the anterior wall. There was mild degree of ischemia in the anterior wall. Recommendation   Clinical correlation is recommended due to poor image quality. Signatures      ----------------------------------------------------------------   Electronically signed by Blanca Rubin MD (Interpreting   Cardiologist) on 04/18/2018 at 20:15        Cath   Conclusions    Procedure Summary   LM-PATENT   LAD PATENT   D2 OSTIAL 50% STENOSIS   LCX-PATENT   RCA- MID 35 TO 40% DIFFUSE LESIONS   Normal LV systolic function. LVEF approximately 60% . EDP 14 mmhg   No MR   NO transaortic Gradient      Recommendations   Continue with aggressive risk factor modification and medical therapy. Estimated Blood Loss:5 ml. Complications:No complications. Signatures    ----------------------------------------------------------------   Electronically signed by Blanca Rubin MD (Performing   Physician) on 05/09/2018 at 12:10   ----------------------------------------------------------------     Conclusions      Summary   Ejection fraction is visually estimated at 50%.    Overall left ventricular function is normal.      Signature ----------------------------------------------------------------   Electronically signed by Kenneth Mckeon MD (Interpreting   physician) on 03/07/2018 at 04:58 PM   ----------------------------------------------------------------        Conclusions      Summary   Normal left ventricular size and severely reduced systolic function. There was severe global hypokinesis. Wall thickness was within normal limits. Ejection fraction was estimated at 25-30%. Left atrial size was severely dilated. Right atrial size was severely dilated. The right ventricular size was severely dilated with mildly reduced   systolic function. There was trace aortic regurgitation. There was moderate tricuspid regurgitation. Assuming RAP = 15 mmHg, the estimated RVSP = 38 mmHg. The pericardium was normal in appearance with a trace pericardial   effusion. IVC size is dilated with reduced respiratory phasic changes (CVP~10-15   mmHg). Signature      ----------------------------------------------------------------   Electronically signed by Renetta Lopez MD (Interpreting   physician) on 01/26/2023 at 02:00 PM        EKG NSR , no acute abn 5/6/2020    Ekg 5/10/21  Sinus  Rhythm   Low voltage -possible pulmonary disease. ABNORMAL     Ekg 11/7/22  Sinus  Rhythm  -With rate variation   cv = 16. Low voltage in precordial leads.    -  Nonspecific T-abnormality. ABNORMAL       Ekg 1/20/23    Sinus  Rhythm  -With rate variation   cv = 16. Low voltage in precordial leads.    -  Nonspecific T-abnormality. ABNORMAL     Ekg 1/20/23  Atrial FIB with  bpm  No acute abn      Assessment       Diagnosis Orders   1. Nonischemic cardiomyopathy [facility])  Monique Lea MD, Electrophysiology, [place]      2. Chronic combined systolic and diastolic congestive heart failure [facility])  Monique Lea MD, Electrophysiology, [place]      3.  Paroxysmal atrial fibrillation [facility])  Monique Lea MD, Electrophysiology, [place]on 4. Pure hypercholesterolemia  Monique Lea MD, Electrophysiology, [place]      5. S/P cardiac cath 2018 - nonobstructive lesion  Monique Lea MD, Electrophysiology, [place] [facility]          Recent hospital assess 1/23/23    Sob -- resolved    Acute systolic chf [address] 3- resolved      PAFB RVR - SR / SB maintained with po amio   - on eliquis     PSVT (likely AFB RVR)     Sinus bradycardia      New NICDMP EF dropped to 25-30% from 60%       Sp cardiac cath 1/28/23- nonobstructive CAD         HLP   LD 44     Plan     The current meds and labs reviewed    Continue the current treatment and with constant vigilance to changes in symptoms and also any potential side effects. Return for care or seek medical attention immediately if symptoms got worse and/or develop new symptoms.]    PAF  Now NSR  hX OF PAF- noted on event monitor 2018  Cont  apixaban 5 mg po bid  Risk of OA has been informed including but not limited ICH  EPS -Dr. Carson Walker recommend med RX for now  Cont amiodarone  To EP for possible atr fib ablation    Cardiomyopathy: improving, no CHF symptoms, no change in clinical condition. Will need periodic echocardiograms depending on symptoms. Possible tachy mediated  Limited echo in 4 weeks  Cont lifevest  Cont entresto  Cont toprol xl 25 po bid  Stop lopressor  NO Travel or flying at this time due to recurrent atr fib RVR and new CMP and Lifevest  Letter writtern to cancel the trip for the nest 3 to 4 months and till further evaluation and stablization        Congestive heart failure: no evidence of fluid overload today, no recent hospitalization for CHF  Cont lasix 20 mg po qd  Cont kcl 20 meq po qd  BMP WNL    Hx of BPV  More in night when in bed and turns to Right  Vestibular rehabilation    Moderate Nonobstructive CAD  On med Rx    Patient Seen, Chart, Consults notes, Labs, Radiology studies reviewed. Hyperlipidemia: on statins, followed periodically.  Patient need periodic lipid and liver profile. Cont crestor    Discussed use, benefit, and side effects of prescribed medications. All patient questions answered. Pt voiced understanding. Instructed to continue current medications, diet and exercise. Continue risk factor modification and medical management. Patient agreed with treatment plan. Follow up as directed.     Under F/u chf clinic     I spent 40 minutes involved in face-to-face discussion of medical issues, prognosis, record review  and plan with the patient today and more than 50% of the time was spent on counseling and coordination of care    RTC  2 months      Bharat Montiel, [facility]

## 2023-02-15 ENCOUNTER — CARE COORDINATION (OUTPATIENT)
Dept: FAMILY MEDICINE CLINIC | Age: 78
End: 2023-02-15

## 2023-02-15 RX ORDER — METOPROLOL SUCCINATE 25 MG/1
25 TABLET, EXTENDED RELEASE ORAL 2 TIMES DAILY
Qty: 14 TABLET | Refills: 0 | Status: SHIPPED | OUTPATIENT
Start: 2023-02-15 | End: 2023-02-18 | Stop reason: SDUPTHER

## 2023-02-15 NOTE — TELEPHONE ENCOUNTER
Patient is calling in regarding the metoprolol succinate that was prescribed to her 2/9/2023 to [facility]. She contacted [facility] and it is going to be another approx 7 days until she gets that in the mail. Can she get a 7 day prescription sent to [facility] to hold her over? Please advise.

## 2023-02-15 NOTE — CARE COORDINATION
Angella Fagan is feeling better. She also mentioned that her life vest has not gone off. I told her that is good news. She said she does not need anything at this time. She will call if she does. I will follow up with her next week.

## 2023-02-16 DIAGNOSIS — E78.00 PURE HYPERCHOLESTEROLEMIA: ICD-10-CM

## 2023-02-18 RX ORDER — METOPROLOL SUCCINATE 25 MG/1
25 TABLET, EXTENDED RELEASE ORAL 2 TIMES DAILY
Qty: 180 TABLET | Refills: 0 | Status: SHIPPED | OUTPATIENT
Start: 2023-02-18

## 2023-02-18 RX ORDER — FUROSEMIDE 20 MG/1
20 TABLET ORAL DAILY
Qty: 90 TABLET | Refills: 0 | Status: SHIPPED | OUTPATIENT
Start: 2023-02-18

## 2023-02-18 RX ORDER — AMIODARONE HYDROCHLORIDE 200 MG/1
200 TABLET ORAL DAILY
Qty: 90 TABLET | Refills: 0 | Status: SHIPPED | OUTPATIENT
Start: 2023-02-18

## 2023-02-18 RX ORDER — POTASSIUM CHLORIDE 750 MG/1
20 TABLET, EXTENDED RELEASE ORAL DAILY
Qty: 90 TABLET | Refills: 0 | Status: SHIPPED | OUTPATIENT
Start: 2023-02-18

## 2023-02-18 RX ORDER — ROSUVASTATIN CALCIUM 20 MG/1
TABLET, COATED ORAL
Qty: 90 TABLET | Refills: 0 | Status: SHIPPED | OUTPATIENT
Start: 2023-02-18

## 2023-02-20 NOTE — TELEPHONE ENCOUNTER
Jong Victoria called requesting a refill on the following medications:  Requested Prescriptions     Pending Prescriptions Disp Refills    sacubitril-valsartan (ENTRESTO) 24-26 MG per tablet 90 tablet 0     Sig: Take 1 tablet by mouth 2 times daily    furosemide (LASIX) 20 MG tablet 90 tablet 0     Sig: Take 1 tablet by mouth daily    potassium chloride (KLOR-CON M) 10 MEQ extended release tablet 90 tablet 0     Sig: Take 2 tablets by mouth daily     Pharmacy verified:[facility]  . pv      Date of last visit:   Date of next visit (if applicable): 3/27/2716

## 2023-02-20 NOTE — TELEPHONE ENCOUNTER
Patient says she has run out of these medications and [facility] told her to call in and ask for 30 tablets to be sent to [facility] because her prescriptions at [facility] is in process of being refilled? Please contact patient for additional information per patient

## 2023-02-21 ENCOUNTER — CARE COORDINATION (OUTPATIENT)
Dept: FAMILY MEDICINE CLINIC | Age: 78
End: 2023-02-21

## 2023-02-21 RX ORDER — FUROSEMIDE 20 MG/1
20 TABLET ORAL DAILY
Qty: 30 TABLET | Refills: 0 | Status: SHIPPED | OUTPATIENT
Start: 2023-02-21

## 2023-02-21 RX ORDER — POTASSIUM CHLORIDE 750 MG/1
20 TABLET, EXTENDED RELEASE ORAL DAILY
Qty: 60 TABLET | Refills: 0 | Status: SHIPPED | OUTPATIENT
Start: 2023-02-21

## 2023-02-21 RX ORDER — ASPIRIN 81 MG/1
81 TABLET, CHEWABLE ORAL DAILY
Qty: 90 TABLET | Refills: 0 | Status: SHIPPED | OUTPATIENT
Start: 2023-02-21

## 2023-02-21 NOTE — CARE COORDINATION
Augustin Bautista is feeling good. She is still wearing the life vest and it has gone off a few times but she is glad to have it. She has another Echo in March. She has no additional home needs and I will follow up with her next week.

## 2023-02-27 NOTE — TELEPHONE ENCOUNTER
Patient needs their aspirin 81 mg [facility] They would like a 90 day supply of this medication.

## 2023-02-28 RX ORDER — ASPIRIN 81 MG/1
81 TABLET, CHEWABLE ORAL DAILY
Qty: 90 TABLET | Refills: 0 | Status: SHIPPED | OUTPATIENT
Start: 2023-02-28

## 2023-03-03 ENCOUNTER — CARE COORDINATION (OUTPATIENT)
Dept: FAMILY MEDICINE CLINIC | Age: 78
End: 2023-03-03

## 2023-03-03 NOTE — CARE COORDINATION
Bertomei Britt is feeling well. Her life vest remains on and it will alarm occasionally but it is only because of the position of the vest. She did ask me about the thyroid medicine that they started her on in the hospital.I told her she could send up a refill request and the Dr Benny Barraza want her to have another level before he refills it since it was started in the hospital, or he may refill it. I told her it was started in the hospital as her thyroid level was off a little and that will help it to work better. I told her it is not unusual for our thyroids to not work as well as we age. She thanked me for calling and I told her to call if she needs anything or has any more questions.

## 2023-03-06 DIAGNOSIS — E03.9 HYPOTHYROIDISM, UNSPECIFIED TYPE: Primary | ICD-10-CM

## 2023-03-06 RX ORDER — ASPIRIN 81 MG/1
81 TABLET, CHEWABLE ORAL DAILY
Qty: 90 TABLET | Refills: 1 | Status: SHIPPED | OUTPATIENT
Start: 2023-03-06

## 2023-03-15 RX ORDER — METOPROLOL SUCCINATE 25 MG/1
25 TABLET, EXTENDED RELEASE ORAL 2 TIMES DAILY
Qty: 180 TABLET | Refills: 2 | Status: SHIPPED | OUTPATIENT
Start: 2023-03-15

## 2023-03-15 RX ORDER — AMIODARONE HYDROCHLORIDE 200 MG/1
200 TABLET ORAL DAILY
Qty: 90 TABLET | Refills: 2 | Status: SHIPPED | OUTPATIENT
Start: 2023-03-15

## 2023-03-20 ENCOUNTER — HOSPITAL ENCOUNTER (OUTPATIENT)
Dept: NON INVASIVE DIAGNOSTICS | Age: 78
Discharge: HOME | End: 2023-03-20
Payer: MEDICARE

## 2023-03-20 DIAGNOSIS — I50.22 CHF NYHA CLASS II, CHRONIC, SYSTOLIC (HCC): ICD-10-CM

## 2023-03-20 LAB
LV EF: 58 %
LVEF MODALITY: NORMAL

## 2023-03-20 PROCEDURE — 93307 TTE W/O DOPPLER COMPLETE: CPT

## 2023-03-20 NOTE — PROGRESS NOTES
Echo changed to limited per Dr. Aly Marshall note to recheck EF- could not get ahold of ordering provider. Explained to patient.

## 2023-03-21 ENCOUNTER — OFFICE VISIT (OUTPATIENT)
Dept: CARDIOLOGY CLINIC | Age: 78
End: 2023-03-21
Payer: MEDICARE

## 2023-03-21 ENCOUNTER — TELEPHONE (OUTPATIENT)
Dept: CARDIOLOGY CLINIC | Age: 78
End: 2023-03-21

## 2023-03-21 ENCOUNTER — HOSPITAL ENCOUNTER (OUTPATIENT)
Age: 78
Discharge: HOME | End: 2023-03-21
Payer: MEDICARE

## 2023-03-21 VITALS
BODY MASS INDEX: 27 KG/M2 | DIASTOLIC BLOOD PRESSURE: 60 MMHG | HEART RATE: 51 BPM | SYSTOLIC BLOOD PRESSURE: 108 MMHG | WEIGHT: 147.6 LBS | OXYGEN SATURATION: 97 %

## 2023-03-21 DIAGNOSIS — I50.22 CHF NYHA CLASS II, CHRONIC, SYSTOLIC (HCC): Primary | ICD-10-CM

## 2023-03-21 DIAGNOSIS — E03.9 HYPOTHYROIDISM, UNSPECIFIED TYPE: ICD-10-CM

## 2023-03-21 DIAGNOSIS — I42.8 NONISCHEMIC CARDIOMYOPATHY (HCC): ICD-10-CM

## 2023-03-21 DIAGNOSIS — I48.0 PAROXYSMAL ATRIAL FIBRILLATION (HCC): ICD-10-CM

## 2023-03-21 DIAGNOSIS — I50.22 CHF NYHA CLASS II, CHRONIC, SYSTOLIC (HCC): ICD-10-CM

## 2023-03-21 LAB
ALBUMIN SERPL BCG-MCNC: 4.4 G/DL (ref 3.5–5.1)
ALP SERPL-CCNC: 71 U/L (ref 38–126)
ALT SERPL W/O P-5'-P-CCNC: 18 U/L (ref 11–66)
ANION GAP SERPL CALC-SCNC: 9 MEQ/L (ref 8–16)
AST SERPL-CCNC: 19 U/L (ref 5–40)
BILIRUB SERPL-MCNC: 0.5 MG/DL (ref 0.3–1.2)
BUN SERPL-MCNC: 16 MG/DL (ref 7–22)
CALCIUM SERPL-MCNC: 10 MG/DL (ref 8.5–10.5)
CHLORIDE SERPL-SCNC: 102 MEQ/L (ref 98–111)
CO2 SERPL-SCNC: 29 MEQ/L (ref 23–33)
CREAT SERPL-MCNC: 1.3 MG/DL (ref 0.4–1.2)
GFR SERPL CREATININE-BSD FRML MDRD: 43 ML/MIN/1.73M2
GLUCOSE SERPL-MCNC: 89 MG/DL (ref 70–108)
POTASSIUM SERPL-SCNC: 5 MEQ/L (ref 3.5–5.2)
PROT SERPL-MCNC: 6.9 G/DL (ref 6.1–8)
SODIUM SERPL-SCNC: 140 MEQ/L (ref 135–145)
TSH SERPL DL<=0.005 MIU/L-ACNC: 2.69 UIU/ML (ref 0.4–4.2)

## 2023-03-21 PROCEDURE — G8399 PT W/DXA RESULTS DOCUMENT: HCPCS | Performed by: NURSE PRACTITIONER

## 2023-03-21 PROCEDURE — 1090F PRES/ABSN URINE INCON ASSESS: CPT | Performed by: NURSE PRACTITIONER

## 2023-03-21 PROCEDURE — 80053 COMPREHEN METABOLIC PANEL: CPT

## 2023-03-21 PROCEDURE — 1036F TOBACCO NON-USER: CPT | Performed by: NURSE PRACTITIONER

## 2023-03-21 PROCEDURE — G8427 DOCREV CUR MEDS BY ELIG CLIN: HCPCS | Performed by: NURSE PRACTITIONER

## 2023-03-21 PROCEDURE — G8484 FLU IMMUNIZE NO ADMIN: HCPCS | Performed by: NURSE PRACTITIONER

## 2023-03-21 PROCEDURE — 1123F ACP DISCUSS/DSCN MKR DOCD: CPT | Performed by: NURSE PRACTITIONER

## 2023-03-21 PROCEDURE — 36415 COLL VENOUS BLD VENIPUNCTURE: CPT

## 2023-03-21 PROCEDURE — 99214 OFFICE O/P EST MOD 30 MIN: CPT | Performed by: NURSE PRACTITIONER

## 2023-03-21 PROCEDURE — G8417 CALC BMI ABV UP PARAM F/U: HCPCS | Performed by: NURSE PRACTITIONER

## 2023-03-21 PROCEDURE — 84443 ASSAY THYROID STIM HORMONE: CPT

## 2023-03-21 RX ORDER — POTASSIUM CHLORIDE 750 MG/1
TABLET, EXTENDED RELEASE ORAL
Qty: 45 TABLET | Refills: 3 | Status: SHIPPED | OUTPATIENT
Start: 2023-03-21

## 2023-03-21 RX ORDER — FUROSEMIDE 20 MG/1
TABLET ORAL
Qty: 45 TABLET | Refills: 3 | Status: SHIPPED | OUTPATIENT
Start: 2023-03-21

## 2023-03-21 ASSESSMENT — ENCOUNTER SYMPTOMS
SHORTNESS OF BREATH: 0
COUGH: 0
ABDOMINAL DISTENTION: 0

## 2023-03-21 NOTE — PROGRESS NOTES
Endocrine: Negative for cold intolerance, heat intolerance, polydipsia. Genitourinary: Negative for dysuria, enuresis, flank pain and hematuria. Musculoskeletal: Negative for arthralgias, joint swelling and neck pain. Neurological: Negative for numbness and headaches. Psychiatric/Behavioral: Negative for agitation, confusion, decreased concentration and dysphoric mood. Past Medical History[de-identified]  Past Medical History:   Diagnosis Date    Acute systolic congestive heart failure ([facility] [address] 2023    Adenomatous colon polyp     Coronary artery disease involving native coronary artery of native heart without angina pectoris 2020    Fibrocystic breast disease     Hyperlipidemia     Hypothyroidism 2023    MVP (mitral valve prolapse)     Osteopenia 2018    PONV (postoperative nausea and vomiting)     Vitamin D deficiency 2018       Past Surgical History:  Past Surgical History:   Procedure Laterality Date    COLONOSCOPY      [facility]    COLONOSCOPY      [facility]    COLONOSCOPY  2017    Dr. Sandra Gurrola (CERVIX STATUS UNKNOWN)      SHAMA AND BSO (CERVIX REMOVED)      TONSILLECTOMY         Family History:  Family History   Problem Relation Age of Onset    Stroke Mother     Heart Disease Mother     Cancer Sister     Breast Cancer Maternal Aunt 64        Social History:  Social History     Socioeconomic History    Marital status:     Tobacco Use    Smoking status: Former     Packs/day: 0.50     Years: 4.00     Pack years: 2.00     Types: Cigarettes     Quit date:      Years since quittin.2    Smokeless tobacco: Never   Vaping Use    Vaping Use: Never used   Substance and Sexual Activity    Alcohol use: Yes     Comment: occsional 2-3 times a month    Drug use: No     Social Determinants of Health     Financial Resource Strain: Low Risk     Difficulty of Paying Living Expenses: Not hard at all   Food Insecurity: No Food Insecurity    Worried

## 2023-03-21 NOTE — TELEPHONE ENCOUNTER
Labs reviewed - K+ 5.0, creat 1.3  Decrease Kcl 10 MWF  Decrease Lasix to MWF  Repeat BMP prior to appt in July - slip mailed

## 2023-03-21 NOTE — PROGRESS NOTES
Neurological:      Mental Status: She is alert and oriented to person, place, and time. Coordination: Coordination normal.   Psychiatric:         Behavior: Behavior normal.     Wt Readings from Last 3 Encounters:   03/21/23 147 lb 9.6 oz (67 kg)   02/09/23 145 lb 9.6 oz (66 kg)   02/06/23 146 lb (66.2 kg)     BP Readings from Last 3 Encounters:   03/21/23 108/60   02/09/23 128/73   02/06/23 124/78     Pulse Readings from Last 3 Encounters:   03/21/23 51   02/09/23 62   02/06/23 81     Body mass index is 27 kg/m². ECHO  Summary   Normal left ventricle size and systolic function. Ejection fraction was   estimated at 55 to 60 %. There were no regional left ventricular wall   motion abnormalities and wall thickness was within normal limits. The left atrium is Mildly dilated. Mildly enlarged right atrium size. Signature      ----------------------------------------------------------------   Electronically signed by Bayron Callahan MD (Interpreting   physician) on 03/20/2023 at 07:42 PM   ----------------------------------------------------------------      ECHO:    Summary   Normal left ventricular size and severely reduced systolic function. There was severe global hypokinesis. Wall thickness was within normal limits. Ejection fraction was estimated at 25-30%. Left atrial size was severely dilated. Right atrial size was severely dilated. The right ventricular size was severely dilated with mildly reduced   systolic function. There was trace aortic regurgitation. There was moderate tricuspid regurgitation. Assuming RAP = 15 mmHg, the estimated RVSP = 38 mmHg. The pericardium was normal in appearance with a trace pericardial   effusion. IVC size is dilated with reduced respiratory phasic changes (CVP~10-15   mmHg).       Signature      ----------------------------------------------------------------   Electronically signed by Faheem Ross MD ([facility]   physician) on 01/26/2023

## 2023-03-22 ENCOUNTER — OFFICE VISIT (OUTPATIENT)
Dept: CARDIOLOGY CLINIC | Age: 78
End: 2023-03-22
Payer: MEDICARE

## 2023-03-22 VITALS
HEIGHT: 62 IN | HEART RATE: 50 BPM | DIASTOLIC BLOOD PRESSURE: 62 MMHG | OXYGEN SATURATION: 98 % | WEIGHT: 148.2 LBS | BODY MASS INDEX: 27.27 KG/M2 | SYSTOLIC BLOOD PRESSURE: 105 MMHG

## 2023-03-22 DIAGNOSIS — I48.0 PAROXYSMAL ATRIAL FIBRILLATION (HCC): Primary | ICD-10-CM

## 2023-03-22 PROCEDURE — 1123F ACP DISCUSS/DSCN MKR DOCD: CPT | Performed by: INTERNAL MEDICINE

## 2023-03-22 PROCEDURE — 99204 OFFICE O/P NEW MOD 45 MIN: CPT | Performed by: INTERNAL MEDICINE

## 2023-03-22 PROCEDURE — 1090F PRES/ABSN URINE INCON ASSESS: CPT | Performed by: INTERNAL MEDICINE

## 2023-03-22 PROCEDURE — G8399 PT W/DXA RESULTS DOCUMENT: HCPCS | Performed by: INTERNAL MEDICINE

## 2023-03-22 PROCEDURE — G8417 CALC BMI ABV UP PARAM F/U: HCPCS | Performed by: INTERNAL MEDICINE

## 2023-03-22 PROCEDURE — 93000 ELECTROCARDIOGRAM COMPLETE: CPT | Performed by: INTERNAL MEDICINE

## 2023-03-22 PROCEDURE — G8427 DOCREV CUR MEDS BY ELIG CLIN: HCPCS | Performed by: INTERNAL MEDICINE

## 2023-03-22 PROCEDURE — G8484 FLU IMMUNIZE NO ADMIN: HCPCS | Performed by: INTERNAL MEDICINE

## 2023-03-22 PROCEDURE — 1036F TOBACCO NON-USER: CPT | Performed by: INTERNAL MEDICINE

## 2023-03-24 ENCOUNTER — TELEPHONE (OUTPATIENT)
Dept: FAMILY MEDICINE CLINIC | Age: 78
End: 2023-03-24

## 2023-03-24 NOTE — TELEPHONE ENCOUNTER
----- Message from Carlos Car MD sent at 3/23/2023  7:55 PM EDT -----  Let her know the TSH was fine. Where would she want the RF to go?

## 2023-03-25 DIAGNOSIS — E03.9 HYPOTHYROIDISM, UNSPECIFIED TYPE: Primary | ICD-10-CM

## 2023-03-25 RX ORDER — LEVOTHYROXINE SODIUM 0.03 MG/1
25 TABLET ORAL DAILY
Qty: 90 TABLET | Refills: 3 | Status: SHIPPED | OUTPATIENT
Start: 2023-03-25

## 2023-04-10 PROBLEM — I50.32 CHRONIC DIASTOLIC CONGESTIVE HEART FAILURE (HCC): Status: ACTIVE | Noted: 2023-04-10

## 2023-04-10 PROBLEM — R00.2 PALPITATIONS: Status: RESOLVED | Noted: 2018-03-29 | Resolved: 2023-04-10

## 2023-04-10 PROBLEM — R00.1 SINUS BRADYCARDIA: Status: ACTIVE | Noted: 2023-04-10

## 2023-06-06 RX ORDER — EMPAGLIFLOZIN 10 MG/1
TABLET, FILM COATED ORAL
Qty: 90 TABLET | Refills: 0 | Status: SHIPPED | OUTPATIENT
Start: 2023-06-06

## 2023-07-05 ENCOUNTER — HOSPITAL ENCOUNTER (OUTPATIENT)
Age: 78
Discharge: HOME | End: 2023-07-05
Payer: MEDICARE

## 2023-07-05 DIAGNOSIS — R00.1 SINUS BRADYCARDIA: ICD-10-CM

## 2023-07-05 DIAGNOSIS — I42.8 NONISCHEMIC CARDIOMYOPATHY (HCC): ICD-10-CM

## 2023-07-05 DIAGNOSIS — I48.0 PAROXYSMAL ATRIAL FIBRILLATION (HCC): ICD-10-CM

## 2023-07-05 DIAGNOSIS — I50.22 CHF NYHA CLASS II, CHRONIC, SYSTOLIC (HCC): ICD-10-CM

## 2023-07-05 DIAGNOSIS — E78.00 PURE HYPERCHOLESTEROLEMIA: ICD-10-CM

## 2023-07-05 DIAGNOSIS — Z98.890 S/P CARDIAC CATH: ICD-10-CM

## 2023-07-05 DIAGNOSIS — I50.32 CHRONIC DIASTOLIC CONGESTIVE HEART FAILURE (HCC): ICD-10-CM

## 2023-07-05 LAB
ALBUMIN SERPL BCG-MCNC: 4.6 G/DL (ref 3.5–5.1)
ALP SERPL-CCNC: 62 U/L (ref 38–126)
ALT SERPL W/O P-5'-P-CCNC: 14 U/L (ref 11–66)
ANION GAP SERPL CALC-SCNC: 16 MEQ/L (ref 8–16)
AST SERPL-CCNC: 16 U/L (ref 5–40)
BILIRUB CONJ SERPL-MCNC: < 0.2 MG/DL (ref 0–0.3)
BILIRUB SERPL-MCNC: 0.5 MG/DL (ref 0.3–1.2)
BUN SERPL-MCNC: 20 MG/DL (ref 7–22)
CALCIUM SERPL-MCNC: 9.7 MG/DL (ref 8.5–10.5)
CHLORIDE SERPL-SCNC: 103 MEQ/L (ref 98–111)
CHOLEST SERPL-MCNC: 196 MG/DL (ref 100–199)
CO2 SERPL-SCNC: 24 MEQ/L (ref 23–33)
CREAT SERPL-MCNC: 1.3 MG/DL (ref 0.4–1.2)
GFR SERPL CREATININE-BSD FRML MDRD: 42 ML/MIN/1.73M2
GLUCOSE SERPL-MCNC: 90 MG/DL (ref 70–108)
HDLC SERPL-MCNC: 81 MG/DL
LDLC SERPL CALC-MCNC: 97 MG/DL
MAGNESIUM SERPL-MCNC: 2.6 MG/DL (ref 1.6–2.4)
POTASSIUM SERPL-SCNC: 4.7 MEQ/L (ref 3.5–5.2)
PROT SERPL-MCNC: 7.3 G/DL (ref 6.1–8)
SODIUM SERPL-SCNC: 143 MEQ/L (ref 135–145)
TRIGL SERPL-MCNC: 89 MG/DL (ref 0–199)
TSH SERPL DL<=0.005 MIU/L-ACNC: 3.01 UIU/ML (ref 0.4–4.2)

## 2023-07-05 PROCEDURE — 82248 BILIRUBIN DIRECT: CPT

## 2023-07-05 PROCEDURE — 84443 ASSAY THYROID STIM HORMONE: CPT

## 2023-07-05 PROCEDURE — 36415 COLL VENOUS BLD VENIPUNCTURE: CPT

## 2023-07-05 PROCEDURE — 80061 LIPID PANEL: CPT

## 2023-07-05 PROCEDURE — 80053 COMPREHEN METABOLIC PANEL: CPT

## 2023-07-05 PROCEDURE — 83735 ASSAY OF MAGNESIUM: CPT

## 2023-07-05 RX ORDER — AMIODARONE HYDROCHLORIDE 200 MG/1
TABLET ORAL
Qty: 90 TABLET | Refills: 0 | Status: SHIPPED | OUTPATIENT
Start: 2023-07-05

## 2023-07-09 ASSESSMENT — ENCOUNTER SYMPTOMS
SHORTNESS OF BREATH: 0
ABDOMINAL DISTENTION: 0
COUGH: 0

## 2023-07-10 ENCOUNTER — OFFICE VISIT (OUTPATIENT)
Dept: CARDIOLOGY CLINIC | Age: 78
End: 2023-07-10
Payer: MEDICARE

## 2023-07-10 VITALS
HEIGHT: 62 IN | DIASTOLIC BLOOD PRESSURE: 70 MMHG | OXYGEN SATURATION: 96 % | HEART RATE: 53 BPM | BODY MASS INDEX: 27.9 KG/M2 | SYSTOLIC BLOOD PRESSURE: 120 MMHG | WEIGHT: 151.6 LBS

## 2023-07-10 DIAGNOSIS — I50.22 CHF NYHA CLASS II, CHRONIC, SYSTOLIC (HCC): Primary | ICD-10-CM

## 2023-07-10 DIAGNOSIS — I42.8 NONISCHEMIC CARDIOMYOPATHY (HCC): ICD-10-CM

## 2023-07-10 DIAGNOSIS — I95.9 HYPOTENSION, UNSPECIFIED HYPOTENSION TYPE: ICD-10-CM

## 2023-07-10 DIAGNOSIS — R00.1 SINUS BRADYCARDIA: ICD-10-CM

## 2023-07-10 DIAGNOSIS — R42 DIZZINESS: ICD-10-CM

## 2023-07-10 DIAGNOSIS — I48.0 PAROXYSMAL ATRIAL FIBRILLATION (HCC): ICD-10-CM

## 2023-07-10 PROCEDURE — G8417 CALC BMI ABV UP PARAM F/U: HCPCS | Performed by: NURSE PRACTITIONER

## 2023-07-10 PROCEDURE — 1090F PRES/ABSN URINE INCON ASSESS: CPT | Performed by: NURSE PRACTITIONER

## 2023-07-10 PROCEDURE — 1123F ACP DISCUSS/DSCN MKR DOCD: CPT | Performed by: NURSE PRACTITIONER

## 2023-07-10 PROCEDURE — G8427 DOCREV CUR MEDS BY ELIG CLIN: HCPCS | Performed by: NURSE PRACTITIONER

## 2023-07-10 PROCEDURE — 1036F TOBACCO NON-USER: CPT | Performed by: NURSE PRACTITIONER

## 2023-07-10 PROCEDURE — G8399 PT W/DXA RESULTS DOCUMENT: HCPCS | Performed by: NURSE PRACTITIONER

## 2023-07-10 PROCEDURE — 99214 OFFICE O/P EST MOD 30 MIN: CPT | Performed by: NURSE PRACTITIONER

## 2023-07-10 RX ORDER — FUROSEMIDE 20 MG/1
20 TABLET ORAL DAILY PRN
Qty: 30 TABLET | Refills: 3 | Status: SHIPPED | OUTPATIENT
Start: 2023-07-10

## 2023-07-10 RX ORDER — POTASSIUM CHLORIDE 750 MG/1
10 TABLET, EXTENDED RELEASE ORAL DAILY PRN
Qty: 30 TABLET | Refills: 3 | Status: SHIPPED | OUTPATIENT
Start: 2023-07-10

## 2023-07-10 NOTE — PATIENT INSTRUCTIONS
You may receive a survey regarding the care you received during your visit. Your input is valuable to us. We encourage you to complete and return your survey. We hope you will choose us in the future for your healthcare needs. Your nurses today were Parvin and [employer]. Office hours:   Mon-Thurs 8-4:30  Friday 8-12  Phone: [phone]    Continue:  Continue current medications  Daily weights and record  Fluid restriction of 2 Liters per day  Limit sodium in diet to around 8001-1871 mg/day  Monitor BP  Activity as tolerated     Call the [address] for any of the following symptoms:   Weight gain of 2-3 pounds in 1 day or 5 pounds in 1 week  Increased shortness of breath  Shortness of breath while laying down  Cough  Chest pain  Swelling in feet, ankles or legs  Bloating in abdomen  Fatigue      Decrease Entresto to HALF tab twice daily    Decrease Lasix to as needed for weight gain or swelling    Call/message w/ update on blood pressure and heart rate in 1-2 weeks.

## 2023-08-07 DIAGNOSIS — E78.00 PURE HYPERCHOLESTEROLEMIA: ICD-10-CM

## 2023-08-07 RX ORDER — ROSUVASTATIN CALCIUM 20 MG/1
TABLET, COATED ORAL
Qty: 90 TABLET | Refills: 1 | Status: SHIPPED | OUTPATIENT
Start: 2023-08-07

## 2023-08-31 DIAGNOSIS — F41.9 ANXIETY DISORDER, UNSPECIFIED TYPE: ICD-10-CM

## 2023-08-31 RX ORDER — SERTRALINE HYDROCHLORIDE 25 MG/1
25 TABLET, FILM COATED ORAL DAILY
Qty: 90 TABLET | Refills: 3 | Status: SHIPPED | OUTPATIENT
Start: 2023-08-31

## 2023-08-31 NOTE — TELEPHONE ENCOUNTER
Date of last visit:  1/3/2023  Date of next visit:  1/9/2024    Requested Prescriptions     Pending Prescriptions Disp Refills    sertraline (ZOLOFT) 25 MG tablet 90 tablet 3     Sig: Take 1 tablet by mouth daily

## 2023-09-05 RX ORDER — EMPAGLIFLOZIN 10 MG/1
TABLET, FILM COATED ORAL
Qty: 90 TABLET | Refills: 3 | OUTPATIENT
Start: 2023-09-05

## 2023-09-05 RX ORDER — ASPIRIN 81 MG
TABLET,CHEWABLE ORAL
Qty: 90 TABLET | Refills: 0 | Status: SHIPPED | OUTPATIENT
Start: 2023-09-05

## 2023-09-06 NOTE — TELEPHONE ENCOUNTER
Mars Mahoney called requesting a refill on the following medications:  Requested Prescriptions     Pending Prescriptions Disp Refills    empagliflozin (JARDIANCE) 10 MG tablet 90 tablet 0     Sig: Take 1 tablet by mouth daily     Pharmacy verified:[facility]   . sami      Date of last visit:   Date of next visit (if applicable): 9/94/1341

## 2023-09-12 NOTE — PATIENT INSTRUCTIONS
You may receive a survey regarding the care you received during your visit. Your input is valuable to us. We encourage you to complete and return your survey. We hope you will choose us in the future for your healthcare needs. Thank you for choosing [address] [place] [address]! Your Medical Assistant Today:  Cristóbal Dueñas      Your RN Today:  Marlene Covarrubias  Your Provider for Today: Dr. Marshal Canchola  Ph. [phone] opt 1         Change from Metoprolol to Carvedilol 6.25 mg twice daily. Change Amiodarone to 100 mg daily.

## 2023-09-13 ENCOUNTER — OFFICE VISIT (OUTPATIENT)
Dept: CARDIOLOGY CLINIC | Age: 78
End: 2023-09-13
Payer: MEDICARE

## 2023-09-13 VITALS
HEART RATE: 50 BPM | OXYGEN SATURATION: 96 % | WEIGHT: 145.6 LBS | SYSTOLIC BLOOD PRESSURE: 131 MMHG | HEIGHT: 62 IN | BODY MASS INDEX: 26.79 KG/M2 | DIASTOLIC BLOOD PRESSURE: 71 MMHG

## 2023-09-13 DIAGNOSIS — R00.1 SINUS BRADYCARDIA: ICD-10-CM

## 2023-09-13 DIAGNOSIS — I48.0 PAROXYSMAL ATRIAL FIBRILLATION (HCC): Primary | ICD-10-CM

## 2023-09-13 PROCEDURE — G8427 DOCREV CUR MEDS BY ELIG CLIN: HCPCS | Performed by: INTERNAL MEDICINE

## 2023-09-13 PROCEDURE — 1090F PRES/ABSN URINE INCON ASSESS: CPT | Performed by: INTERNAL MEDICINE

## 2023-09-13 PROCEDURE — 1036F TOBACCO NON-USER: CPT | Performed by: INTERNAL MEDICINE

## 2023-09-13 PROCEDURE — 99214 OFFICE O/P EST MOD 30 MIN: CPT | Performed by: INTERNAL MEDICINE

## 2023-09-13 PROCEDURE — G8399 PT W/DXA RESULTS DOCUMENT: HCPCS | Performed by: INTERNAL MEDICINE

## 2023-09-13 PROCEDURE — 1123F ACP DISCUSS/DSCN MKR DOCD: CPT | Performed by: INTERNAL MEDICINE

## 2023-09-13 PROCEDURE — 93000 ELECTROCARDIOGRAM COMPLETE: CPT | Performed by: INTERNAL MEDICINE

## 2023-09-13 PROCEDURE — G8417 CALC BMI ABV UP PARAM F/U: HCPCS | Performed by: INTERNAL MEDICINE

## 2023-09-13 RX ORDER — AMIODARONE HYDROCHLORIDE 100 MG/1
100 TABLET ORAL DAILY
Qty: 90 TABLET | Refills: 1 | Status: SHIPPED | OUTPATIENT
Start: 2023-09-13

## 2023-09-13 RX ORDER — CARVEDILOL 6.25 MG/1
6.25 TABLET ORAL 2 TIMES DAILY
Qty: 180 TABLET | Refills: 1 | Status: SHIPPED | OUTPATIENT
Start: 2023-09-13

## 2023-09-13 NOTE — PROGRESS NOTES
[facility] ()  [address] [address]  Dept: [phone]  Cardiac Electrophysiology: Follow up Note  Patient's demographics:  Date:   9/13/2023  Patient name:              Jorgito Quispe  YOB: 1945  Sex:    female   MRN:   [id number]    Primary Care Physician:  Gomez Daniels MD    Cardiologist:  Stacie Camejo MD    Reason for Follow up: Tachycardia-bradycardia syndrome. Clinical Summary:  77/F was admitted to [facility] in 1/2023 with new onset atrial fibrillation and left ventricular systolic dysfunction. She underwent cardioversion with restoration of EF from 25 to 50%. Here for a follow-up visit. Complains of fatigue and shortness of breath with physical activity. Occasional dizziness. No syncope, lower extremity swelling, orthopnea or palpitations. Medcial Hx: PAF dx 1/2023 spontaneous conversion on amiodarone and apixaban , sinus bradycardia, NICM (TIC) dx 1/2023 resolved (LVEF 25-30% => 55%), mild TR, HTN, HPL and PONV. Review of systems:  Constitutional: Negative for chills and fever  HENT: Negative for congestion, sinus pressure, sneezing and sore throat. Eyes: Negative for pain, discharge, redness and itching. Respiratory: Negative for apnea, cough  Gastrointestinal: Negative for blood in stool, constipation, diarrhea   Endocrine: Negative for cold intolerance, heat intolerance, polydipsia. Genitourinary: Negative for dysuria, enuresis, flank pain and hematuria. Musculoskeletal: Negative for arthralgias, joint swelling and neck pain. Neurological: Negative for numbness and headaches. Psychiatric/Behavioral: Negative for agitation, confusion, decreased concentration and dysphoric mood.       Past Medical History[de-identified]  Past Medical History:   Diagnosis Date    Acute systolic congestive heart failure ([address]) 01/29/2023    Adenomatous colon polyp 2006    Atrial fibrillation ([address])

## 2023-11-02 ENCOUNTER — HOSPITAL ENCOUNTER (OUTPATIENT)
Age: 78
Discharge: HOME | End: 2023-11-02
Payer: MEDICARE

## 2023-11-02 DIAGNOSIS — I50.22 CHF NYHA CLASS II, CHRONIC, SYSTOLIC (HCC): ICD-10-CM

## 2023-11-02 LAB
ANION GAP SERPL CALC-SCNC: 16 MEQ/L (ref 8–16)
BUN SERPL-MCNC: 19 MG/DL (ref 7–22)
CALCIUM SERPL-MCNC: 10.1 MG/DL (ref 8.5–10.5)
CHLORIDE SERPL-SCNC: 100 MEQ/L (ref 98–111)
CO2 SERPL-SCNC: 23 MEQ/L (ref 23–33)
CREAT SERPL-MCNC: 1.3 MG/DL (ref 0.4–1.2)
GFR SERPL CREATININE-BSD FRML MDRD: 42 ML/MIN/1.73M2
GLUCOSE SERPL-MCNC: 92 MG/DL (ref 70–108)
MAGNESIUM SERPL-MCNC: 2.3 MG/DL (ref 1.6–2.4)
POTASSIUM SERPL-SCNC: 4.8 MEQ/L (ref 3.5–5.2)
SODIUM SERPL-SCNC: 139 MEQ/L (ref 135–145)

## 2023-11-02 PROCEDURE — 83735 ASSAY OF MAGNESIUM: CPT

## 2023-11-02 PROCEDURE — 36415 COLL VENOUS BLD VENIPUNCTURE: CPT

## 2023-11-02 PROCEDURE — 80048 BASIC METABOLIC PNL TOTAL CA: CPT

## 2023-11-06 ENCOUNTER — TELEPHONE (OUTPATIENT)
Dept: CARDIOLOGY CLINIC | Age: 78
End: 2023-11-06

## 2023-11-06 ENCOUNTER — OFFICE VISIT (OUTPATIENT)
Dept: CARDIOLOGY CLINIC | Age: 78
End: 2023-11-06
Payer: MEDICARE

## 2023-11-06 ENCOUNTER — HOSPITAL ENCOUNTER (OUTPATIENT)
Age: 78
Discharge: HOME | End: 2023-11-06
Payer: MEDICARE

## 2023-11-06 ENCOUNTER — TELEPHONE (OUTPATIENT)
Dept: FAMILY MEDICINE CLINIC | Age: 78
End: 2023-11-06

## 2023-11-06 VITALS
SYSTOLIC BLOOD PRESSURE: 91 MMHG | HEART RATE: 57 BPM | HEIGHT: 62 IN | WEIGHT: 156.2 LBS | DIASTOLIC BLOOD PRESSURE: 65 MMHG | BODY MASS INDEX: 28.74 KG/M2

## 2023-11-06 DIAGNOSIS — E03.9 HYPOTHYROIDISM, UNSPECIFIED TYPE: ICD-10-CM

## 2023-11-06 DIAGNOSIS — R00.1 SINUS BRADYCARDIA: ICD-10-CM

## 2023-11-06 DIAGNOSIS — I50.32 CHRONIC DIASTOLIC CONGESTIVE HEART FAILURE (HCC): Primary | ICD-10-CM

## 2023-11-06 DIAGNOSIS — E55.9 VITAMIN D DEFICIENCY: ICD-10-CM

## 2023-11-06 DIAGNOSIS — I48.0 PAROXYSMAL ATRIAL FIBRILLATION (HCC): ICD-10-CM

## 2023-11-06 DIAGNOSIS — E55.9 VITAMIN D DEFICIENCY: Primary | ICD-10-CM

## 2023-11-06 DIAGNOSIS — E78.00 PURE HYPERCHOLESTEROLEMIA: ICD-10-CM

## 2023-11-06 DIAGNOSIS — Z98.890 S/P CARDIAC CATH: ICD-10-CM

## 2023-11-06 DIAGNOSIS — I42.8 NONISCHEMIC CARDIOMYOPATHY (HCC): ICD-10-CM

## 2023-11-06 PROBLEM — I50.21 ACUTE SYSTOLIC CONGESTIVE HEART FAILURE (HCC): Status: RESOLVED | Noted: 2023-01-29 | Resolved: 2023-11-06

## 2023-11-06 PROBLEM — I50.42 CHRONIC COMBINED SYSTOLIC AND DIASTOLIC CONGESTIVE HEART FAILURE (HCC): Status: RESOLVED | Noted: 2023-02-09 | Resolved: 2023-11-06

## 2023-11-06 LAB
25(OH)D3 SERPL-MCNC: 45 NG/ML (ref 30–100)
TSH SERPL DL<=0.005 MIU/L-ACNC: 1.91 UIU/ML (ref 0.4–4.2)

## 2023-11-06 PROCEDURE — 82306 VITAMIN D 25 HYDROXY: CPT

## 2023-11-06 PROCEDURE — 99214 OFFICE O/P EST MOD 30 MIN: CPT | Performed by: INTERNAL MEDICINE

## 2023-11-06 PROCEDURE — 1090F PRES/ABSN URINE INCON ASSESS: CPT | Performed by: INTERNAL MEDICINE

## 2023-11-06 PROCEDURE — 1036F TOBACCO NON-USER: CPT | Performed by: INTERNAL MEDICINE

## 2023-11-06 PROCEDURE — G8427 DOCREV CUR MEDS BY ELIG CLIN: HCPCS | Performed by: INTERNAL MEDICINE

## 2023-11-06 PROCEDURE — 1123F ACP DISCUSS/DSCN MKR DOCD: CPT | Performed by: INTERNAL MEDICINE

## 2023-11-06 PROCEDURE — 84443 ASSAY THYROID STIM HORMONE: CPT

## 2023-11-06 PROCEDURE — G8399 PT W/DXA RESULTS DOCUMENT: HCPCS | Performed by: INTERNAL MEDICINE

## 2023-11-06 PROCEDURE — 36415 COLL VENOUS BLD VENIPUNCTURE: CPT

## 2023-11-06 PROCEDURE — G8417 CALC BMI ABV UP PARAM F/U: HCPCS | Performed by: INTERNAL MEDICINE

## 2023-11-06 PROCEDURE — G8484 FLU IMMUNIZE NO ADMIN: HCPCS | Performed by: INTERNAL MEDICINE

## 2023-11-06 NOTE — TELEPHONE ENCOUNTER
I will order the thyroid lab and the vit D lab. I don't see that any others are needed now or before the February appointment.

## 2023-11-06 NOTE — TELEPHONE ENCOUNTER
Pt called stating dr would order a lab for thyroid along with a hydroxy so she doesn't have to go to another dr. She wants to know when it will be ordered and when she should go have it done ?  She also would like to know if there is any other blood work dr might need done before her appt Feb. Please call pt once addressed

## 2023-11-06 NOTE — PROGRESS NOTES
disorder noted  Musculoskeletal - no joint tenderness, deformity or swelling  Extremities - peripheral pulses normal, no pedal edema, no clubbing or cyanosis  Skin - normal coloration and turgor, no rashes, no suspicious skin lesions noted    Lab  No results for input(s): \"CKTOTAL\", \"CKMB\", \"CKMBINDEX\", \"TROPONINI\" in the last 72 hours.   CBC:   Lab Results   Component Value Date/Time    WBC 6.1 01/29/2023 05:17 AM    RBC 4.92 01/29/2023 05:17 AM    HGB 13.1 01/29/2023 05:17 AM    HCT 41.1 01/29/2023 05:17 AM    MCV 83.5 01/29/2023 05:17 AM    MCH 26.6 01/29/2023 05:17 AM    MCHC 31.9 01/29/2023 05:17 AM    RDW 12.8 05/09/2018 09:14 AM     01/29/2023 05:17 AM    MPV 9.9 01/29/2023 05:17 AM     BMP:    Lab Results   Component Value Date/Time     11/02/2023 11:36 AM    K 4.8 11/02/2023 11:36 AM    K 3.9 05/09/2018 09:15 AM     11/02/2023 11:36 AM    CO2 23 11/02/2023 11:36 AM    BUN 19 11/02/2023 11:36 AM    LABALBU 4.6 07/05/2023 09:30 AM    CREATININE 1.3 11/02/2023 11:36 AM    CALCIUM 10.1 11/02/2023 11:36 AM    LABGLOM 42 11/02/2023 11:36 AM    GLUCOSE 92 11/02/2023 11:36 AM     Hepatic Function Panel:    Lab Results   Component Value Date/Time    ALKPHOS 62 07/05/2023 09:30 AM    ALT 14 07/05/2023 09:30 AM    AST 16 07/05/2023 09:30 AM    PROT 7.3 07/05/2023 09:30 AM    BILITOT 0.5 07/05/2023 09:30 AM    BILIDIR <0.2 07/05/2023 09:30 AM    LABALBU 4.6 07/05/2023 09:30 AM     Magnesium:    Lab Results   Component Value Date/Time    MG 2.3 11/02/2023 11:36 AM     Warfarin PT/INR:  No components found for: \"PTPATWAR\", \"PTINRWAR\"  HgBA1c:    Lab Results   Component Value Date/Time    LABA1C 5.7 01/27/2023 05:40 AM     FLP:    Lab Results   Component Value Date/Time    TRIG 89 07/05/2023 09:30 AM    HDL 81 07/05/2023 09:30 AM    LDLCALC 97 07/05/2023 09:30 AM    LDLDIRECT 168.41 12/10/2021 08:35 AM     TSH:    Lab Results   Component Value Date/Time    TSH 1.910 11/06/2023 01:36 PM       Event

## 2023-11-16 ENCOUNTER — TELEPHONE (OUTPATIENT)
Dept: FAMILY MEDICINE CLINIC | Age: 78
End: 2023-11-16

## 2023-11-16 DIAGNOSIS — E55.9 VITAMIN D DEFICIENCY: Primary | ICD-10-CM

## 2023-11-16 NOTE — TELEPHONE ENCOUNTER
----- Message from Linh Siegel MD sent at 11/15/2023  8:53 PM EST -----  Let her know the thyroid was fine and the vit D should be better. Is she taking  5000 units of the vit D everyday? Taking it with a meal for better absorption?

## 2023-11-16 NOTE — TELEPHONE ENCOUNTER
Keke informed by Phone  patient takes 5000 units vitamin d3 3 times a week per her cardiologist.  Patient doesn't take with food but will start. Cameron Harkins

## 2023-12-04 RX ORDER — ASPIRIN 81 MG/1
81 TABLET, CHEWABLE ORAL DAILY
Qty: 90 TABLET | Refills: 1 | Status: SHIPPED | OUTPATIENT
Start: 2023-12-04

## 2024-02-05 DIAGNOSIS — E78.00 PURE HYPERCHOLESTEROLEMIA: ICD-10-CM

## 2024-02-05 RX ORDER — ROSUVASTATIN CALCIUM 20 MG/1
TABLET, COATED ORAL
Qty: 90 TABLET | Refills: 1 | Status: SHIPPED | OUTPATIENT
Start: 2024-02-05

## 2024-02-19 ENCOUNTER — HOSPITAL ENCOUNTER (OUTPATIENT)
Age: 79
Discharge: HOME | End: 2024-02-21
Attending: INTERNAL MEDICINE
Payer: MEDICARE

## 2024-02-19 DIAGNOSIS — I48.0 PAF (PAROXYSMAL ATRIAL FIBRILLATION) (HCC): ICD-10-CM

## 2024-02-19 PROCEDURE — 93270 REMOTE 30 DAY ECG REV/REPORT: CPT

## 2024-02-19 RX ORDER — CARVEDILOL 6.25 MG/1
6.25 TABLET ORAL 2 TIMES DAILY
Qty: 180 TABLET | Refills: 1 | Status: SHIPPED | OUTPATIENT
Start: 2024-02-19

## 2024-02-21 RX ORDER — AMIODARONE HYDROCHLORIDE 100 MG/1
100 TABLET ORAL DAILY
Qty: 90 TABLET | Refills: 0 | Status: SHIPPED | OUTPATIENT
Start: 2024-02-21

## 2024-03-04 SDOH — HEALTH STABILITY: PHYSICAL HEALTH: ON AVERAGE, HOW MANY DAYS PER WEEK DO YOU ENGAGE IN MODERATE TO STRENUOUS EXERCISE (LIKE A BRISK WALK)?: 5 DAYS

## 2024-03-04 SDOH — HEALTH STABILITY: PHYSICAL HEALTH: ON AVERAGE, HOW MANY MINUTES DO YOU ENGAGE IN EXERCISE AT THIS LEVEL?: 30 MIN

## 2024-03-04 ASSESSMENT — LIFESTYLE VARIABLES
HOW MANY STANDARD DRINKS CONTAINING ALCOHOL DO YOU HAVE ON A TYPICAL DAY: 1
HOW OFTEN DO YOU HAVE A DRINK CONTAINING ALCOHOL: 2
HOW OFTEN DO YOU HAVE A DRINK CONTAINING ALCOHOL: MONTHLY OR LESS
HOW OFTEN DO YOU HAVE SIX OR MORE DRINKS ON ONE OCCASION: 1
HOW MANY STANDARD DRINKS CONTAINING ALCOHOL DO YOU HAVE ON A TYPICAL DAY: 1 OR 2

## 2024-03-04 ASSESSMENT — PATIENT HEALTH QUESTIONNAIRE - PHQ9
SUM OF ALL RESPONSES TO PHQ9 QUESTIONS 1 & 2: 0
SUM OF ALL RESPONSES TO PHQ QUESTIONS 1-9: 0
2. FEELING DOWN, DEPRESSED OR HOPELESS: 0
SUM OF ALL RESPONSES TO PHQ QUESTIONS 1-9: 0
1. LITTLE INTEREST OR PLEASURE IN DOING THINGS: 0

## 2024-03-07 ENCOUNTER — OFFICE VISIT (OUTPATIENT)
Dept: FAMILY MEDICINE CLINIC | Age: 79
End: 2024-03-07

## 2024-03-07 VITALS
HEIGHT: 62 IN | RESPIRATION RATE: 16 BRPM | BODY MASS INDEX: 29.44 KG/M2 | WEIGHT: 160 LBS | HEART RATE: 52 BPM | DIASTOLIC BLOOD PRESSURE: 72 MMHG | SYSTOLIC BLOOD PRESSURE: 118 MMHG

## 2024-03-07 DIAGNOSIS — Z00.00 MEDICARE ANNUAL WELLNESS VISIT, SUBSEQUENT: Primary | ICD-10-CM

## 2024-03-07 DIAGNOSIS — F41.9 ANXIETY DISORDER, UNSPECIFIED TYPE: ICD-10-CM

## 2024-03-07 DIAGNOSIS — H61.21 RIGHT EAR IMPACTED CERUMEN: ICD-10-CM

## 2024-03-07 RX ORDER — SERTRALINE HYDROCHLORIDE 25 MG/1
25 TABLET, FILM COATED ORAL DAILY
Qty: 90 TABLET | Refills: 3 | Status: SHIPPED | OUTPATIENT
Start: 2024-03-07

## 2024-03-07 SDOH — ECONOMIC STABILITY: HOUSING INSECURITY
IN THE LAST 12 MONTHS, WAS THERE A TIME WHEN YOU DID NOT HAVE A STEADY PLACE TO SLEEP OR SLEPT IN A SHELTER (INCLUDING NOW)?: NO

## 2024-03-07 SDOH — ECONOMIC STABILITY: INCOME INSECURITY
HOW HARD IS IT FOR YOU TO PAY FOR THE VERY BASICS LIKE FOOD, HOUSING, MEDICAL CARE, AND HEATING?: PATIENT UNABLE TO ANSWER

## 2024-03-07 SDOH — ECONOMIC STABILITY: FOOD INSECURITY: WITHIN THE PAST 12 MONTHS, YOU WORRIED THAT YOUR FOOD WOULD RUN OUT BEFORE YOU GOT MONEY TO BUY MORE.: NEVER TRUE

## 2024-03-07 SDOH — ECONOMIC STABILITY: FOOD INSECURITY: WITHIN THE PAST 12 MONTHS, THE FOOD YOU BOUGHT JUST DIDN'T LAST AND YOU DIDN'T HAVE MONEY TO GET MORE.: NEVER TRUE

## 2024-03-07 NOTE — PATIENT INSTRUCTIONS
Bake, broil, and steam foods instead of frying them.     Eat a variety of fruit and vegetables every day. Dark green, deep orange, red, or yellow fruits and vegetables are especially good for you. Examples include spinach, carrots, peaches, and berries.     Foods high in fiber can reduce your cholesterol and provide important vitamins and minerals. High-fiber foods include whole-grain cereals and breads, oatmeal, beans, brown rice, citrus fruits, and apples.     Eat lean proteins. Heart-healthy proteins include seafood, lean meats and poultry, eggs, beans, peas, nuts, seeds, and soy products.     Limit drinks and foods with added sugar. These include candy, desserts, and soda pop.   Heart-healthy lifestyle    If your doctor recommends it, get more exercise. For many people, walking is a good choice. Or you may want to swim, bike, or do other activities. Bit by bit, increase the time you're active every day. Try for at least 30 minutes on most days of the week.     Try to quit or cut back on using tobacco and other nicotine products. This includes smoking and vaping. If you need help quitting, talk to your doctor about stop-smoking programs and medicines. These can increase your chances of quitting for good. Quitting is one of the most important things you can do to protect your heart. It is never too late to quit. Try to avoid secondhand smoke too.     Stay at a weight that's healthy for you. Talk to your doctor if you need help losing weight.     Try to get 7 to 9 hours of sleep each night.     Limit alcohol to 2 drinks a day for men and 1 drink a day for women. Too much alcohol can cause health problems.     Manage other health problems such as diabetes, high blood pressure, and high cholesterol. If you think you may have a problem with alcohol or drug use, talk to your doctor.   Medicines    Take your medicines exactly as prescribed. Call your doctor if you think you are having a problem with your medicine.

## 2024-03-07 NOTE — PROGRESS NOTES
Medicare Annual Wellness Visit    Keke Lee is here for Medicare AWV    Assessment & Plan   Anxiety disorder, unspecified type  The following orders have not been finalized:  -     sertraline (ZOLOFT) 25 MG tablet    Recommendations for Preventive Services Due: see orders and patient instructions/AVS.  Recommended screening schedule for the next 5-10 years is provided to the patient in written form: see Patient Instructions/AVS.     No follow-ups on file.     Subjective   The following acute and/or chronic problems were also addressed today:  There has been a scalely area on the left side of the nose. It has not changed over the past few years  She wants her ears checked  We discussed how she had a colonoscopy in 2017 and has not rescheduled. It told her of the precancerous polyps she had before but doesn't want to    Patient's complete Health Risk Assessment and screening values have been reviewed and are found in Flowsheets. The following problems were reviewed today and where indicated follow up appointments were made and/or referrals ordered.    Positive Risk Factor Screenings with Interventions:                Activity, Diet, and Weight:  On average, how many days per week do you engage in moderate to strenuous exercise (like a brisk walk)?: 5 days  On average, how many minutes do you engage in exercise at this level?: 30 min    Do you eat balanced/healthy meals regularly?: (!) No    Body mass index is 29.26 kg/m².      Do you eat balanced/healthy meals regularly Interventions:  We discussed the attempt to.         Hearing Screen:  Do you or your family notice any trouble with your hearing that hasn't been managed with hearing aids?: (!) Yes    Interventions:  She wants the ears checked    Vision Screen:  Do you have difficulty driving, watching TV, or doing any of your daily activities because of your eyesight?: No  Have you had an eye exam within the past year?: (!) No  No results

## 2024-03-12 ENCOUNTER — OFFICE VISIT (OUTPATIENT)
Dept: CARDIOLOGY CLINIC | Age: 79
End: 2024-03-12
Payer: MEDICARE

## 2024-03-12 VITALS
DIASTOLIC BLOOD PRESSURE: 68 MMHG | WEIGHT: 159.8 LBS | RESPIRATION RATE: 18 BRPM | BODY MASS INDEX: 29.4 KG/M2 | OXYGEN SATURATION: 96 % | SYSTOLIC BLOOD PRESSURE: 102 MMHG | HEIGHT: 62 IN | HEART RATE: 53 BPM

## 2024-03-12 DIAGNOSIS — I50.22 CHF NYHA CLASS II, CHRONIC, SYSTOLIC (HCC): Primary | ICD-10-CM

## 2024-03-12 DIAGNOSIS — I42.8 NONISCHEMIC CARDIOMYOPATHY (HCC): ICD-10-CM

## 2024-03-12 DIAGNOSIS — I48.0 PAROXYSMAL ATRIAL FIBRILLATION (HCC): ICD-10-CM

## 2024-03-12 PROCEDURE — G8484 FLU IMMUNIZE NO ADMIN: HCPCS | Performed by: NURSE PRACTITIONER

## 2024-03-12 PROCEDURE — 99214 OFFICE O/P EST MOD 30 MIN: CPT | Performed by: NURSE PRACTITIONER

## 2024-03-12 PROCEDURE — 1123F ACP DISCUSS/DSCN MKR DOCD: CPT | Performed by: NURSE PRACTITIONER

## 2024-03-12 PROCEDURE — 1090F PRES/ABSN URINE INCON ASSESS: CPT | Performed by: NURSE PRACTITIONER

## 2024-03-12 PROCEDURE — G8417 CALC BMI ABV UP PARAM F/U: HCPCS | Performed by: NURSE PRACTITIONER

## 2024-03-12 PROCEDURE — G8427 DOCREV CUR MEDS BY ELIG CLIN: HCPCS | Performed by: NURSE PRACTITIONER

## 2024-03-12 PROCEDURE — G8399 PT W/DXA RESULTS DOCUMENT: HCPCS | Performed by: NURSE PRACTITIONER

## 2024-03-12 PROCEDURE — 1036F TOBACCO NON-USER: CPT | Performed by: NURSE PRACTITIONER

## 2024-03-12 ASSESSMENT — ENCOUNTER SYMPTOMS
ABDOMINAL DISTENTION: 0
SHORTNESS OF BREATH: 0
COUGH: 0

## 2024-03-12 NOTE — PATIENT INSTRUCTIONS
You may receive a survey regarding the care you received during your visit.  Your input is valuable to us.  We encourage you to complete and return your survey.  We hope you will choose us in the future for your healthcare needs.    Thank you for choosing [facility]    Your Medical Assistant Today:  Tita DE JESUS      Your RN Today:  Soledad DE JESUS   Your Provider for Today: Dr. Simon  Ph. [phone]            Decreasing coreg to  3.125 mg BID    Keep a log of BP and Heart rates.    Call office if you continue to have fatigue.

## 2024-03-12 NOTE — PROGRESS NOTES
Heart Failure Clinic       Visit Date: 3/12/2024  Cardiologist:  Dr. Fowler  Primary Care Physician: Dr. Cardoso, Jermaine PIERCE MD  Referred by: hospital    Keke Lee is a 78 y.o. female who presents today for:  Chief Complaint   Patient presents with    Congestive Heart Failure         HPI:   Keke Lee is a 78 y.o. female who presents to the office for a follow up patient visit in the heart failure clinic.  Accompanied by no one  Lives home alone    TYPE HF: HFrEF (25-30%), RVSP 38 mmHg, LAE, CLARI, mild RV dysfunction - IMPROVED 55-60% (3/2023)  Valves:  mod TR   Cause: NICM - tachy induced  Device: no  HX: HLD, Afib (Eliquis)    Dry Wt:  ?    Hospitalization:  1/2023 - SOB.  Afib RVR.  New CMP, EF 25-30%.  Cath - nonobstructive CAD.  No BB at d/c d/t sinus susy, 40-50    2/2023 - 146#.  DOWN 20# since discharge, 8 days ago.  Denies palpitations, no issues since Friday.  Feels relatively back to baseline.  Has had more on/off fatigue/SOB since having Bronchitis last fall.    Janeth been trying to adjust meds - started on Amio but ended up in hospital shortly after.      3/2023 - F/U ECHO - EF improved 55-60%  147#. Feeling good - no concerns voiced.    No fluid on exam.   Not feel limited in activity.   BPs 110-120s - not check HR - denies palpitations.     7/2023:  151#  Feeling good but does note few dizzy spells - seemingly w/ positional changes.  HR ranges 45-55  BP 80-90s  No fluid on exam.  Denies palpitations/CP      TODAY 3/2024 - 159#  Feeling good - no fluid issues.  BPs still runs little low - 90-110s  HR 50s  Rare dizziness, no falls  Managing home tasks - does well.       Past Medical History:   Diagnosis Date    Acute systolic congestive heart failure (HCC) 01/29/2023    Adenomatous colon polyp 2006    Atrial fibrillation (HCC)     Coronary artery disease involving native coronary artery of native heart without angina pectoris 05/06/2020    Fibrocystic breast disease 1990

## 2024-03-12 NOTE — PATIENT INSTRUCTIONS
You may receive a survey regarding the care you received during your visit.  Your input is valuable to us.  We encourage you to complete and return your survey.  We hope you will choose us in the future for your healthcare needs.    Your nurses today were Nessa.  Office hours:   Mon-Thurs 8-4:30  Friday 8-12  Phone: [phone]    Continue:  Continue current medications  Daily weights and record  Fluid restriction of 2 Liters per day  Limit sodium in diet to around 8813-6634 mg/day  Monitor BP  Activity as tolerated     Call the Heart Failure Clinic for any of the following symptoms:   Weight gain of 2-3 pounds in 1 day or 5 pounds in 1 week  Increased shortness of breath  Shortness of breath while laying down  Cough  Chest pain  Swelling in feet, ankles or legs  Bloating in abdomen  Fatigue

## 2024-03-13 ENCOUNTER — TELEPHONE (OUTPATIENT)
Dept: CARDIOLOGY CLINIC | Age: 79
End: 2024-03-13

## 2024-03-13 ENCOUNTER — OFFICE VISIT (OUTPATIENT)
Dept: CARDIOLOGY CLINIC | Age: 79
End: 2024-03-13
Payer: MEDICARE

## 2024-03-13 VITALS
SYSTOLIC BLOOD PRESSURE: 107 MMHG | WEIGHT: 159.8 LBS | OXYGEN SATURATION: 97 % | HEART RATE: 48 BPM | BODY MASS INDEX: 29.4 KG/M2 | HEIGHT: 62 IN | DIASTOLIC BLOOD PRESSURE: 71 MMHG

## 2024-03-13 DIAGNOSIS — I48.0 PAROXYSMAL ATRIAL FIBRILLATION (HCC): Primary | ICD-10-CM

## 2024-03-13 PROCEDURE — G8427 DOCREV CUR MEDS BY ELIG CLIN: HCPCS | Performed by: INTERNAL MEDICINE

## 2024-03-13 PROCEDURE — 1123F ACP DISCUSS/DSCN MKR DOCD: CPT | Performed by: INTERNAL MEDICINE

## 2024-03-13 PROCEDURE — 99214 OFFICE O/P EST MOD 30 MIN: CPT | Performed by: INTERNAL MEDICINE

## 2024-03-13 PROCEDURE — 1036F TOBACCO NON-USER: CPT | Performed by: INTERNAL MEDICINE

## 2024-03-13 PROCEDURE — G8484 FLU IMMUNIZE NO ADMIN: HCPCS | Performed by: INTERNAL MEDICINE

## 2024-03-13 PROCEDURE — G8399 PT W/DXA RESULTS DOCUMENT: HCPCS | Performed by: INTERNAL MEDICINE

## 2024-03-13 PROCEDURE — 93000 ELECTROCARDIOGRAM COMPLETE: CPT | Performed by: INTERNAL MEDICINE

## 2024-03-13 PROCEDURE — G8417 CALC BMI ABV UP PARAM F/U: HCPCS | Performed by: INTERNAL MEDICINE

## 2024-03-13 PROCEDURE — 1090F PRES/ABSN URINE INCON ASSESS: CPT | Performed by: INTERNAL MEDICINE

## 2024-03-13 RX ORDER — CARVEDILOL 6.25 MG/1
3.12 TABLET ORAL 2 TIMES DAILY
Qty: 180 TABLET | Refills: 1
Start: 2024-03-13

## 2024-03-13 NOTE — PROGRESS NOTES
[facility]   Heart Center (EP)  [address] [address]  Dept: [phone]  Cardiac Electrophysiology: Follow up Note  Patient's demographics:  Date:   3/13/2024  Patient name:              Keke Lee  YOB: 1945  Sex:    female   MRN:   [id number]    Primary Care Physician:  Jermaine Cardoso MD    Cardiologist:  Valentina Fowler MD    Reason for Follow up:  Tachycardia-bradycardia syndrome.    Clinical Summary:  77/F was admitted to [facility] in 1/2023 with new onset atrial fibrillation and left ventricular systolic dysfunction.  She underwent cardioversion with normalization of EF 25 => 50%.  Follow-up Holter monitor from 2/19/2024 showed sinus bradycardia, average heart rate 54 bpm.  No atrial fibrillation.    Complains of fatigue and intermittent lightheadedness.  No syncope, chest pain, shortness of breath or palpitations. Medcial Hx: PAF dx 1/2023 => DCCV on amiodarone and apixaban , sinus bradycardia, NICM (TIC) dx 1/2023 resolved (LVEF 25-30% => 55%), mild TR, HTN, HPL and PONV.     Review of systems:  Constitutional: Negative for chills and fever  HENT: Negative for congestion, sinus pressure, sneezing and sore throat.    Eyes: Negative for pain, discharge, redness and itching.   Respiratory: Negative for apnea, cough  Gastrointestinal: Negative for blood in stool, constipation, diarrhea   Endocrine: Negative for cold intolerance, heat intolerance, polydipsia.  Genitourinary: Negative for dysuria, enuresis, flank pain and hematuria.   Musculoskeletal: Negative for arthralgias, joint swelling and neck pain.   Neurological: Negative for numbness and headaches.   Psychiatric/Behavioral: Negative for agitation, confusion, decreased concentration and dysphoric mood.      Past Medical History::  Past Medical History:   Diagnosis Date    Acute systolic congestive heart failure (HCC) 01/29/2023    Adenomatous colon polyp 2006

## 2024-03-14 PROBLEM — I48.91 A-FIB (HCC): Status: ACTIVE | Noted: 2024-03-13

## 2024-03-15 NOTE — TELEPHONE ENCOUNTER
Impression:  Paroxysmal atrial fibrillation.  [id number] score 4 (age, sex, HTN).  On amiodarone and apixaban.  Symptomatic sinus bradycardia, Likely medication related.  Tachycardia induced cardiomyopathy, resolved.  LVEF 55%.  HTN, obesity and mild TR.  Fatigue and lightheadedness.      Assessment And Recommendations:  The patient continues to have symptomatic sinus bradycardia despite decreasing the dose of the amiodarone to 100 mg and switching metoprolol to carvedilol.  Will decrease it carvedilol to 3.125 mg daily.  If she continues to be symptomatic will benefit with catheter ablation and discontinuation of the amiodarone.       Luis Alberto Simon MD, MRCP, FACC, FHRS on 3/13/2024 at 9:59 AM     Patient would like to move forward with the ablation.       
OK  
Procedure: Afib ablation  Date: 04.02.2024  Arrival Time: 11am  Meds to Hold: Eliquis the evening prior,  Dr. Simon -  please see medications-  do you agree?      Instructions verbalized to the patient.  Instructions emailed to the patient.       
Pt would like the earlier spot for an ablation if still open.  Please call her on monday.   
Schedule  
Examination

## 2024-03-18 ENCOUNTER — HOSPITAL ENCOUNTER (OUTPATIENT)
Age: 79
Discharge: HOME | End: 2024-03-20
Payer: MEDICARE

## 2024-03-18 ENCOUNTER — HOSPITAL ENCOUNTER (OUTPATIENT)
Dept: WOMENS IMAGING | Age: 79
Discharge: HOME | End: 2024-03-18
Payer: MEDICARE

## 2024-03-18 ENCOUNTER — TELEPHONE (OUTPATIENT)
Dept: ADMINISTRATIVE | Age: 79
End: 2024-03-18

## 2024-03-18 DIAGNOSIS — Z12.31 VISIT FOR SCREENING MAMMOGRAM: ICD-10-CM

## 2024-03-18 DIAGNOSIS — I50.22 CHF NYHA CLASS II, CHRONIC, SYSTOLIC (HCC): ICD-10-CM

## 2024-03-18 LAB
ECHO AV CUSP MM: 1.6 CM
ECHO AV PEAK GRADIENT: 6 MMHG
ECHO AV PEAK VELOCITY: 1.3 M/S
ECHO AV VELOCITY RATIO: 0.69
ECHO LA AREA 2C: 13.1 CM2
ECHO LA AREA 4C: 14.2 CM2
ECHO LA DIAMETER: 3.4 CM
ECHO LA MAJOR AXIS: 5.3 CM
ECHO LA MINOR AXIS: 5 CM
ECHO LA VOL BP: 30 ML (ref 22–52)
ECHO LA VOL MOD A2C: 28 ML (ref 22–52)
ECHO LA VOL MOD A4C: 30 ML (ref 22–52)
ECHO LV E' LATERAL VELOCITY: 6 CM/S
ECHO LV E' SEPTAL VELOCITY: 6 CM/S
ECHO LV FRACTIONAL SHORTENING: 33 % (ref 28–44)
ECHO LV INTERNAL DIMENSION DIASTOLIC: 4.5 CM (ref 3.9–5.3)
ECHO LV INTERNAL DIMENSION SYSTOLIC: 3 CM
ECHO LV IVSD: 0.7 CM (ref 0.6–0.9)
ECHO LV MASS 2D: 113.6 G (ref 67–162)
ECHO LV POSTERIOR WALL DIASTOLIC: 0.9 CM (ref 0.6–0.9)
ECHO LV RELATIVE WALL THICKNESS RATIO: 0.4
ECHO LVOT PEAK GRADIENT: 3 MMHG
ECHO LVOT PEAK VELOCITY: 0.9 M/S
ECHO MV A VELOCITY: 0.53 M/S
ECHO MV E DECELERATION TIME (DT): 180 MS
ECHO MV E VELOCITY: 0.69 M/S
ECHO MV E/A RATIO: 1.3
ECHO MV E/E' LATERAL: 11.5
ECHO MV E/E' RATIO (AVERAGED): 11.5
ECHO MV REGURGITANT PEAK GRADIENT: 61 MMHG
ECHO MV REGURGITANT PEAK VELOCITY: 3.9 M/S
ECHO PULMONARY ARTERY END DIASTOLIC PRESSURE: 5 MMHG
ECHO PV MAX VELOCITY: 0.6 M/S
ECHO PV PEAK GRADIENT: 1 MMHG
ECHO PV REGURGITANT MAX VELOCITY: 1.1 M/S
ECHO RV INTERNAL DIMENSION: 2.9 CM
ECHO TV E WAVE: 0.5 M/S
ECHO TV REGURGITANT MAX VELOCITY: 2.8 M/S
ECHO TV REGURGITANT PEAK GRADIENT: 31 MMHG

## 2024-03-18 PROCEDURE — 93306 TTE W/DOPPLER COMPLETE: CPT | Performed by: INTERNAL MEDICINE

## 2024-03-18 PROCEDURE — 93306 TTE W/DOPPLER COMPLETE: CPT

## 2024-03-18 PROCEDURE — 77063 BREAST TOMOSYNTHESIS BI: CPT

## 2024-03-18 NOTE — TELEPHONE ENCOUNTER
Patient states she would like to change time for ablation. Please return call to discuss.     Thank you

## 2024-03-25 ENCOUNTER — HOSPITAL ENCOUNTER (OUTPATIENT)
Age: 79
Discharge: HOME | End: 2024-03-25

## 2024-04-01 DIAGNOSIS — E03.9 HYPOTHYROIDISM, UNSPECIFIED TYPE: ICD-10-CM

## 2024-04-01 RX ORDER — LEVOTHYROXINE SODIUM 0.03 MG/1
25 TABLET ORAL DAILY
Qty: 90 TABLET | Refills: 3 | Status: SHIPPED | OUTPATIENT
Start: 2024-04-01

## 2024-04-01 NOTE — TELEPHONE ENCOUNTER
Date of last visit:  3/7/2024  Date of next visit:  Visit date not found    Requested Prescriptions     Pending Prescriptions Disp Refills    levothyroxine (SYNTHROID) 25 MCG tablet [Pharmacy Med Name: L-THYROXINE (SYNTHROID) TABS 25MCG] 90 tablet 3     Sig: TAKE 1 TABLET DAILY

## 2024-04-12 ENCOUNTER — PREP FOR PROCEDURE (OUTPATIENT)
Dept: CARDIOLOGY | Age: 79
End: 2024-04-12

## 2024-04-12 RX ORDER — SODIUM CHLORIDE 9 MG/ML
INJECTION, SOLUTION INTRAVENOUS PRN
Status: CANCELLED | OUTPATIENT
Start: 2024-04-12

## 2024-04-12 RX ORDER — SODIUM CHLORIDE 0.9 % (FLUSH) 0.9 %
5-40 SYRINGE (ML) INJECTION EVERY 12 HOURS SCHEDULED
Status: CANCELLED | OUTPATIENT
Start: 2024-04-12

## 2024-04-12 RX ORDER — SODIUM CHLORIDE 0.9 % (FLUSH) 0.9 %
5-40 SYRINGE (ML) INJECTION PRN
Status: CANCELLED | OUTPATIENT
Start: 2024-04-12

## 2024-04-15 ENCOUNTER — HOSPITAL ENCOUNTER (OUTPATIENT)
Age: 79
Setting detail: OUTPATIENT SURGERY
Discharge: HOME HEALTH | End: 2024-04-15
Attending: INTERNAL MEDICINE | Admitting: INTERNAL MEDICINE
Payer: MEDICARE

## 2024-04-15 ENCOUNTER — ANESTHESIA (OUTPATIENT)
Age: 79
End: 2024-04-15
Payer: MEDICARE

## 2024-04-15 ENCOUNTER — ANESTHESIA EVENT (OUTPATIENT)
Age: 79
End: 2024-04-15
Payer: MEDICARE

## 2024-04-15 VITALS
RESPIRATION RATE: 23 BRPM | HEIGHT: 62 IN | HEART RATE: 83 BPM | SYSTOLIC BLOOD PRESSURE: 108 MMHG | TEMPERATURE: 97.5 F | WEIGHT: 157.85 LBS | DIASTOLIC BLOOD PRESSURE: 79 MMHG | OXYGEN SATURATION: 95 % | BODY MASS INDEX: 29.05 KG/M2

## 2024-04-15 DIAGNOSIS — I48.91 A-FIB (HCC): ICD-10-CM

## 2024-04-15 LAB
ABO: NORMAL
ACTIVATED CLOTTING TIME: 152 SECONDS (ref 1–150)
ACTIVATED CLOTTING TIME: 385 SECONDS (ref 1–150)
ACTIVATED CLOTTING TIME: 417 SECONDS (ref 1–150)
ANION GAP SERPL CALC-SCNC: 11 MEQ/L (ref 8–16)
ANTIBODY SCREEN: NORMAL
APTT PPP: 29.8 SECONDS (ref 22–38)
BUN SERPL-MCNC: 20 MG/DL (ref 7–22)
CALCIUM SERPL-MCNC: 8.8 MG/DL (ref 8.5–10.5)
CHLORIDE SERPL-SCNC: 106 MEQ/L (ref 98–111)
CO2 SERPL-SCNC: 26 MEQ/L (ref 23–33)
CREAT SERPL-MCNC: 1.3 MG/DL (ref 0.4–1.2)
DEPRECATED RDW RBC AUTO: 42.9 FL (ref 35–45)
ECHO BSA: 1.77 M2
ERYTHROCYTE [DISTWIDTH] IN BLOOD BY AUTOMATED COUNT: 12.7 % (ref 11.5–14.5)
GFR SERPL CREATININE-BSD FRML MDRD: 42 ML/MIN/1.73M2
GLUCOSE SERPL-MCNC: 98 MG/DL (ref 70–108)
HCT VFR BLD AUTO: 42.4 % (ref 37–47)
HGB BLD-MCNC: 13.9 GM/DL (ref 12–16)
INR PPP: 0.96 (ref 0.85–1.13)
MCH RBC QN AUTO: 30.4 PG (ref 26–33)
MCHC RBC AUTO-ENTMCNC: 32.8 GM/DL (ref 32.2–35.5)
MCV RBC AUTO: 92.8 FL (ref 81–99)
PLATELET # BLD AUTO: 200 THOU/MM3 (ref 130–400)
PMV BLD AUTO: 9.9 FL (ref 9.4–12.4)
POTASSIUM SERPL-SCNC: 4.1 MEQ/L (ref 3.5–5.2)
RBC # BLD AUTO: 4.57 MILL/MM3 (ref 4.2–5.4)
RH FACTOR: NORMAL
SODIUM SERPL-SCNC: 143 MEQ/L (ref 135–145)
WBC # BLD AUTO: 6.6 THOU/MM3 (ref 4.8–10.8)

## 2024-04-15 PROCEDURE — 7100000011 HC PHASE II RECOVERY - ADDTL 15 MIN: Performed by: INTERNAL MEDICINE

## 2024-04-15 PROCEDURE — 86900 BLOOD TYPING SEROLOGIC ABO: CPT

## 2024-04-15 PROCEDURE — 92960 CARDIOVERSION ELECTRIC EXT: CPT | Performed by: INTERNAL MEDICINE

## 2024-04-15 PROCEDURE — 93656 COMPRE EP EVAL ABLTJ ATR FIB: CPT | Performed by: INTERNAL MEDICINE

## 2024-04-15 PROCEDURE — 85730 THROMBOPLASTIN TIME PARTIAL: CPT

## 2024-04-15 PROCEDURE — 6360000002 HC RX W HCPCS: Performed by: NURSE ANESTHETIST, CERTIFIED REGISTERED

## 2024-04-15 PROCEDURE — 93657 TX L/R ATRIAL FIB ADDL: CPT | Performed by: INTERNAL MEDICINE

## 2024-04-15 PROCEDURE — 93005 ELECTROCARDIOGRAM TRACING: CPT | Performed by: STUDENT IN AN ORGANIZED HEALTH CARE EDUCATION/TRAINING PROGRAM

## 2024-04-15 PROCEDURE — 7100000001 HC PACU RECOVERY - ADDTL 15 MIN: Performed by: INTERNAL MEDICINE

## 2024-04-15 PROCEDURE — 2580000003 HC RX 258: Performed by: STUDENT IN AN ORGANIZED HEALTH CARE EDUCATION/TRAINING PROGRAM

## 2024-04-15 PROCEDURE — C1760 CLOSURE DEV, VASC: HCPCS | Performed by: INTERNAL MEDICINE

## 2024-04-15 PROCEDURE — 93655 ICAR CATH ABLTJ DSCRT ARRHYT: CPT | Performed by: INTERNAL MEDICINE

## 2024-04-15 PROCEDURE — 86850 RBC ANTIBODY SCREEN: CPT

## 2024-04-15 PROCEDURE — 93010 ELECTROCARDIOGRAM REPORT: CPT | Performed by: INTERNAL MEDICINE

## 2024-04-15 PROCEDURE — C1766 INTRO/SHEATH,STRBLE,NON-PEEL: HCPCS | Performed by: INTERNAL MEDICINE

## 2024-04-15 PROCEDURE — 85347 COAGULATION TIME ACTIVATED: CPT

## 2024-04-15 PROCEDURE — C1759 CATH, INTRA ECHOCARDIOGRAPHY: HCPCS | Performed by: INTERNAL MEDICINE

## 2024-04-15 PROCEDURE — 7100000000 HC PACU RECOVERY - FIRST 15 MIN: Performed by: INTERNAL MEDICINE

## 2024-04-15 PROCEDURE — 80048 BASIC METABOLIC PNL TOTAL CA: CPT

## 2024-04-15 PROCEDURE — 2709999900 HC NON-CHARGEABLE SUPPLY: Performed by: INTERNAL MEDICINE

## 2024-04-15 PROCEDURE — 93005 ELECTROCARDIOGRAM TRACING: CPT | Performed by: INTERNAL MEDICINE

## 2024-04-15 PROCEDURE — 6370000000 HC RX 637 (ALT 250 FOR IP): Performed by: NURSE ANESTHETIST, CERTIFIED REGISTERED

## 2024-04-15 PROCEDURE — 86901 BLOOD TYPING SEROLOGIC RH(D): CPT

## 2024-04-15 PROCEDURE — C1894 INTRO/SHEATH, NON-LASER: HCPCS | Performed by: INTERNAL MEDICINE

## 2024-04-15 PROCEDURE — 85610 PROTHROMBIN TIME: CPT

## 2024-04-15 PROCEDURE — 85027 COMPLETE CBC AUTOMATED: CPT

## 2024-04-15 PROCEDURE — 3700000001 HC ADD 15 MINUTES (ANESTHESIA): Performed by: INTERNAL MEDICINE

## 2024-04-15 PROCEDURE — 2500000003 HC RX 250 WO HCPCS: Performed by: INTERNAL MEDICINE

## 2024-04-15 PROCEDURE — C1730 CATH, EP, 19 OR FEW ELECT: HCPCS | Performed by: INTERNAL MEDICINE

## 2024-04-15 PROCEDURE — 7100000010 HC PHASE II RECOVERY - FIRST 15 MIN: Performed by: INTERNAL MEDICINE

## 2024-04-15 PROCEDURE — C1732 CATH, EP, DIAG/ABL, 3D/VECT: HCPCS | Performed by: INTERNAL MEDICINE

## 2024-04-15 PROCEDURE — 2720000010 HC SURG SUPPLY STERILE: Performed by: INTERNAL MEDICINE

## 2024-04-15 PROCEDURE — 3700000000 HC ANESTHESIA ATTENDED CARE: Performed by: INTERNAL MEDICINE

## 2024-04-15 PROCEDURE — 36415 COLL VENOUS BLD VENIPUNCTURE: CPT

## 2024-04-15 PROCEDURE — G0269 OCCLUSIVE DEVICE IN VEIN ART: HCPCS | Performed by: INTERNAL MEDICINE

## 2024-04-15 PROCEDURE — 2500000003 HC RX 250 WO HCPCS: Performed by: NURSE ANESTHETIST, CERTIFIED REGISTERED

## 2024-04-15 RX ORDER — EPHEDRINE SULFATE/0.9% NACL/PF 25 MG/5 ML
SYRINGE (ML) INTRAVENOUS PRN
Status: DISCONTINUED | OUTPATIENT
Start: 2024-04-15 | End: 2024-04-15 | Stop reason: SDUPTHER

## 2024-04-15 RX ORDER — PANTOPRAZOLE SODIUM 40 MG/1
40 TABLET, DELAYED RELEASE ORAL
Qty: 30 TABLET | Refills: 0 | Status: SHIPPED | OUTPATIENT
Start: 2024-04-15

## 2024-04-15 RX ORDER — SODIUM CHLORIDE 0.9 % (FLUSH) 0.9 %
5-40 SYRINGE (ML) INJECTION EVERY 12 HOURS SCHEDULED
Status: DISCONTINUED | OUTPATIENT
Start: 2024-04-15 | End: 2024-04-15 | Stop reason: HOSPADM

## 2024-04-15 RX ORDER — HEPARIN SODIUM 1000 [USP'U]/ML
INJECTION, SOLUTION INTRAVENOUS; SUBCUTANEOUS PRN
Status: DISCONTINUED | OUTPATIENT
Start: 2024-04-15 | End: 2024-04-15 | Stop reason: SDUPTHER

## 2024-04-15 RX ORDER — SODIUM CHLORIDE 0.9 % (FLUSH) 0.9 %
5-40 SYRINGE (ML) INJECTION PRN
Status: DISCONTINUED | OUTPATIENT
Start: 2024-04-15 | End: 2024-04-15 | Stop reason: HOSPADM

## 2024-04-15 RX ORDER — DEXAMETHASONE SODIUM PHOSPHATE 10 MG/ML
INJECTION, EMULSION INTRAMUSCULAR; INTRAVENOUS PRN
Status: DISCONTINUED | OUTPATIENT
Start: 2024-04-15 | End: 2024-04-15 | Stop reason: SDUPTHER

## 2024-04-15 RX ORDER — SODIUM CHLORIDE 9 MG/ML
INJECTION, SOLUTION INTRAVENOUS PRN
Status: DISCONTINUED | OUTPATIENT
Start: 2024-04-15 | End: 2024-04-15 | Stop reason: HOSPADM

## 2024-04-15 RX ORDER — MINERAL OIL AND WHITE PETROLATUM 150; 830 MG/G; MG/G
OINTMENT OPHTHALMIC PRN
Status: DISCONTINUED | OUTPATIENT
Start: 2024-04-15 | End: 2024-04-15 | Stop reason: SDUPTHER

## 2024-04-15 RX ORDER — PROPOFOL 10 MG/ML
INJECTION, EMULSION INTRAVENOUS PRN
Status: DISCONTINUED | OUTPATIENT
Start: 2024-04-15 | End: 2024-04-15 | Stop reason: SDUPTHER

## 2024-04-15 RX ORDER — PROTAMINE SULFATE 10 MG/ML
INJECTION, SOLUTION INTRAVENOUS PRN
Status: DISCONTINUED | OUTPATIENT
Start: 2024-04-15 | End: 2024-04-15 | Stop reason: SDUPTHER

## 2024-04-15 RX ORDER — ONDANSETRON 2 MG/ML
INJECTION INTRAMUSCULAR; INTRAVENOUS PRN
Status: DISCONTINUED | OUTPATIENT
Start: 2024-04-15 | End: 2024-04-15 | Stop reason: SDUPTHER

## 2024-04-15 RX ORDER — FENTANYL CITRATE 50 UG/ML
INJECTION, SOLUTION INTRAMUSCULAR; INTRAVENOUS PRN
Status: DISCONTINUED | OUTPATIENT
Start: 2024-04-15 | End: 2024-04-15 | Stop reason: SDUPTHER

## 2024-04-15 RX ORDER — ROCURONIUM BROMIDE 10 MG/ML
INJECTION, SOLUTION INTRAVENOUS PRN
Status: DISCONTINUED | OUTPATIENT
Start: 2024-04-15 | End: 2024-04-15 | Stop reason: SDUPTHER

## 2024-04-15 RX ORDER — LIDOCAINE HYDROCHLORIDE 20 MG/ML
INJECTION, SOLUTION INTRAVENOUS PRN
Status: DISCONTINUED | OUTPATIENT
Start: 2024-04-15 | End: 2024-04-15 | Stop reason: SDUPTHER

## 2024-04-15 RX ORDER — FUROSEMIDE 10 MG/ML
INJECTION INTRAMUSCULAR; INTRAVENOUS PRN
Status: DISCONTINUED | OUTPATIENT
Start: 2024-04-15 | End: 2024-04-15 | Stop reason: SDUPTHER

## 2024-04-15 RX ADMIN — EPHEDRINE SULFATE 25 MG: 5 INJECTION INTRAVENOUS at 07:34

## 2024-04-15 RX ADMIN — HEPARIN SODIUM 5000 UNITS: 1000 INJECTION INTRAVENOUS; SUBCUTANEOUS at 08:10

## 2024-04-15 RX ADMIN — SUGAMMADEX 200 MG: 100 INJECTION, SOLUTION INTRAVENOUS at 09:46

## 2024-04-15 RX ADMIN — EPHEDRINE SULFATE 25 MG: 5 INJECTION INTRAVENOUS at 07:28

## 2024-04-15 RX ADMIN — ONDANSETRON 4 MG: 2 INJECTION INTRAMUSCULAR; INTRAVENOUS at 07:31

## 2024-04-15 RX ADMIN — PROPOFOL 50 MG: 10 INJECTION, EMULSION INTRAVENOUS at 07:57

## 2024-04-15 RX ADMIN — HEPARIN SODIUM 10000 UNITS: 1000 INJECTION INTRAVENOUS; SUBCUTANEOUS at 08:17

## 2024-04-15 RX ADMIN — FUROSEMIDE 20 MG: 10 INJECTION, SOLUTION INTRAMUSCULAR; INTRAVENOUS at 09:50

## 2024-04-15 RX ADMIN — ROCURONIUM BROMIDE 50 MG: 10 INJECTION INTRAVENOUS at 07:23

## 2024-04-15 RX ADMIN — MINERAL OIL AND WHITE PETROLATUM 2 APPLICATOR: 150; 830 OINTMENT OPHTHALMIC at 07:31

## 2024-04-15 RX ADMIN — EPHEDRINE SULFATE 20 MG: 5 INJECTION INTRAVENOUS at 07:57

## 2024-04-15 RX ADMIN — LIDOCAINE HYDROCHLORIDE 100 MG: 20 INJECTION, SOLUTION INTRAVENOUS at 07:22

## 2024-04-15 RX ADMIN — FENTANYL CITRATE 100 MCG: 50 INJECTION, SOLUTION INTRAMUSCULAR; INTRAVENOUS at 07:21

## 2024-04-15 RX ADMIN — PROPOFOL 150 MG: 10 INJECTION, EMULSION INTRAVENOUS at 07:23

## 2024-04-15 RX ADMIN — PROTAMINE SULFATE 60 MG: 10 INJECTION, SOLUTION INTRAVENOUS at 09:34

## 2024-04-15 RX ADMIN — DEXAMETHASONE SODIUM PHOSPHATE 8 MG: 10 INJECTION, EMULSION INTRAMUSCULAR; INTRAVENOUS at 07:31

## 2024-04-15 ASSESSMENT — ENCOUNTER SYMPTOMS: SHORTNESS OF BREATH: 1

## 2024-04-15 NOTE — H&P
studies complete and results available.   Comment:    [x]Previous sedation/anesthesia experiences assessed.   Comment:    [x]The patient is an appropriate candidate to undergo the planned procedure sedation and anesthesia. (Refer to nursing sedation/analgesia documentation record)  [x]Formulation and discussion of sedation/procedure plan, risks, and expectations with patient and/or responsible adult completed.  [x]Patient examined immediately prior to the procedure. (Refer to nursing sedation/analgesia documentation record)        Luis Alberto Simon MD MD FAC, RS  Electronically signed 4/15/2024 at 6:44 AM

## 2024-04-15 NOTE — PROCEDURES
[facility]  HEART CENTER (EP)  [address] [address]  Dept: [phone]  CARDIAC ELECTROPHYSIOLOGY: PROCEDURE NOTE  Patients Demographics:  Date:   4/15/2024  Patient name:              Keke Lee  YOB: 1945  Sex:    female   MRN:   [id number]    Cardiac Electrophysiologist:   Luis Alberto Simon MD, MRCP, FACC, RS    Primary Care Provider:     Jermaine Cardoso MD     Cardiologist:  Valentina Fowler MD    Procedure Planned:  Atrial fibrillation catheter ablation.     Indication/s for the Procedure:  Drug refractory symptomatic paroxysmal atrial fibrillation.     Brief Clinical Summary:  78/F with symptomatic paroxysmal atrial fibrillation refractory to amiodarone and sinus bradycardia, precluding optimization of AAD therapy electively presents for arrhythmia catheter ablation and EP study. Held Apixaban last night.  Medcial Hx: PAF dx 1/2023 => DCCV on amiodarone and apixaban , sinus bradycardia, NICM (TIC) dx 1/2023 resolved (LVEF 25-30% => 55%), mild TR, HTN, HPL and PONV.      Procedure/s Performed:  Left and right pulmonary vein isolation in pairs.  Empiric Cavo-tricuspid isthmus ablation.   Electric cardioversion for atrial fibrillation.  Electrophysiology consult.     Post operative diagnosis:  Atrial fibrillation.  Non-sustained atrial flutter.      Description of the Procedure:  The patient was brought to electrophysiology laboratory in a fasting and non-sedated state.  General anesthesia was administered by anesthesia Service. She was prepped and draped in the usual sterile fashion. Lidocaine, 2% was injected into femoral regions and right side of the neck for local anesthesia. Vascular access was obtained under ultrasound guidance and hemostatic short sheaths placed over the guidewires (9-St Helenian and 7-St Helenian in the right femoral vein, 11-St Helenian in left femoral vein, and 8-St Helenian in the right internal jugular vein).  A 3D geometry of right

## 2024-04-15 NOTE — DISCHARGE INSTRUCTIONS
Activity:     1.  Do your normal activities except:             No heavy lifting more than 10 pounds for 3 days.              No strenuous activity for 7 days.              No driving for 24 hours.        2. You may shower in the morning, but no tub baths for 3 days.      3.  Ask your doctor when you can return to work.     4.  Remove the bandages from the puncture sites the next day if they have not already fallen off.     5. You may cover the site with a regular bandage for another day to keep the site clean and dry.     Diet:      You may resume your previous diet.     Care of Access site (groin and neck)     1. Examine the puncture site daily until it is well-healed.      2. Some bruising is expected and will slowly disappear.      3. Minor pain/soreness is also normal.       Medications:      Follow the schedule of medicines given  by your doctor.     Things to report to your doctor     1.  Fever, swelling, bleeding.     2.  Redness at the puncture site.      3.  Warm or hot sensation at puncture sites.      4.  Purulence or drainage of fluid from the puncture site.      5.  Numbness, tingling or coldness in the affected leg.     Appointments:  Call your doctor at the following number , [phone] to set up an appointment for one month after.    Call your doctor immediately:      1. If you have a fever, drainage from your puncture site, persistent chest discomfort , or uncontrolled heart rate.     2.If you cannot contact your doctor, go to the nearest emergency room.          If bleeding or swelling occurs to neck or groin sites, apply pressure, call 911 or the doctor and maintain pressure over site.              SEDATION/ANALGESIA INFORMATION/HOME GOING ADVICE    SEDATION / ANALGESIA INFORMATION / HOME GOING ADVICE  You have received the sedation/analgesia medication during your visit     Sedation/analgesia is used during short medical procedures under controlled supervision. The medication will produce a

## 2024-04-15 NOTE — PROGRESS NOTES
0957 Pt arrives to PACU, alert and oriented, resp easy and unlabored on room air. VSS. Cath sites, clean dry and soft.     1010 Cath sites continue to remain clean dry and soft. Pt rests in bed and denies pain. Resp easy and unlabored on room air. VSS.    1025 Report called to Eliana MOHR. Pt voiding via external catheter.     1027 Patient meets criteria for discharge from PACU at this time.     1035 Transported back to  in stable condition.     1050 Bedside update given to Eliana MOHR

## 2024-04-15 NOTE — PLAN OF CARE
Problem: Chronic Conditions and Co-morbidities  Goal: Patient's chronic conditions and co-morbidity symptoms are monitored and maintained or improved  Outcome: Completed  Flowsheets (Taken 4/15/2024 0541)  Care Plan - Patient's Chronic Conditions and Co-Morbidity Symptoms are Monitored and Maintained or Improved:   Monitor and assess patient's chronic conditions and comorbid symptoms for stability, deterioration, or improvement   Collaborate with multidisciplinary team to address chronic and comorbid conditions and prevent exacerbation or deterioration   Update acute care plan with appropriate goals if chronic or comorbid symptoms are exacerbated and prevent overall improvement and discharge     Problem: Discharge Planning  Goal: Discharge to home or other facility with appropriate resources  Outcome: Completed  Flowsheets (Taken 4/15/2024 0541)  Discharge to home or other facility with appropriate resources:   Identify barriers to discharge with patient and caregiver   Arrange for needed discharge resources and transportation as appropriate   Identify discharge learning needs (meds, wound care, etc)   Arrange for interpreters to assist at discharge as needed

## 2024-04-15 NOTE — BRIEF OP NOTE
Brief Postoperative Note    Date:   4/15/2024  Patient name: Keke Lee  YOB: 1945  Sex: female   MRN:   [id number]    PCP: Jermaine Cardoso MD     Procedure:AF ablation and CTI ablation.     Pre-Op Diagnosis: Atrial fibrillation.     Post-Op Diagnosis: AF and AFL.     Surgeon: Luis Alberto Simon MD, MRCP, FACC, FHRS    Assistant: Radha.     Anesthesia/sedation: General.     Estimated Blood Loss (mL): less than 50     Complications: None    Recommendations:  See orders in Epic.  Bed rest for 2 hours.  Watch access site/s for bleeding or swelling.  Hold pressure if bleeding or swelling.  Ambulate 2 hour after suture/sheath removal.  Remove Roberts's catheter (if in place) once patient ambulates.  Stop IV fluids once patient starts eating.  Resume home medications as tonight.   Follow up in 4 weeks in EP clinic.           Electronically signed by Luis Alberto Simon MD, FACC, FHRS on 4/15/2024 at 9:57 AM

## 2024-04-15 NOTE — PROGRESS NOTES
Patient admitted to 2E 14  Ambulatory for Atrial fibrillation ablation.  Patient NPO. Patient accompanied by daughter , Elda.  Vital signs obtained.   Assessment and data collection intiated.   Oriented to room.  Policies and procedures for 2E explained.   All questions answered with no further questions at this time.   Fall precautions reviewed with patient.     0506 Care plan reviewed with patient and daughter.  Patient and daughter verbalize understanding of the plan of care and contribute to goal setting.       0713 to cath lab per bed, stable condition.     1045 care taken over from cath lab, site with dressing to rt and left groin and rt side of neck dry and intact. Patient reinstructed not to bend groin, not to cross legs, not to lift head off of pillow, if laughs or coughs to hold site for reinforcement. Voices understanding , taking water without difficulty.       1200 up in room, tolerated activity well.     1255 Discharge instructions given.     1400 Discharged per wheelchair, stable condition.

## 2024-04-15 NOTE — FLOWSHEET NOTE
04/15/24 1520   AVS Reviewed   AVS & discharge instructions reviewed with patient and/or representative? Yes   Reviewed instructions with Patient;Other (name and relationship in comment)  (Elda, daughter)   Level of Understanding Questions answered;Verbalized understanding

## 2024-04-15 NOTE — ANESTHESIA PRE PROCEDURE
chloride flush 0.9 % injection 5-40 mL  5-40 mL IntraVENous PRN Mortimer, Connor, PA-C        0.9 % sodium chloride infusion   IntraVENous PRN Mortimer, Connor, PA-C 50 mL/hr at 04/15/24 0615 Rate Verify at 04/15/24 0615       Allergies:  No Known Allergies    Problem List:    Patient Active Problem List   Diagnosis Code    Pure hypercholesterolemia E78.00    Adenomatous polyp of colon D12.6    Vitamin D deficiency E55.9    Osteopenia M85.80    Paroxysmal atrial fibrillation (HCC) I48.0    History of cardiac catheterization Z98.890    BPV (benign positional vertigo), right H81.11    Dyslipidemia E78.5    Postmenopausal state Z78.0    SOB (shortness of breath) on exertion R06.02    S/P cardiac cath 2018 - nonobstructive lesion Z98.890    Atrial fibrillation with rapid ventricular response (HCC) I48.91    Atrial fibrillation with RVR (HCC) I48.91    Nonischemic cardiomyopathy (HCC) I42.8    Hypothyroidism E03.9    Sinus bradycardia R00.1    Chronic diastolic congestive heart failure (HCC) I50.32    A-fib (HCC) I48.91       Past Medical History:        Diagnosis Date    Acute systolic congestive heart failure (HCC) 01/29/2023    Adenomatous colon polyp 2006    Atrial fibrillation (HCC)     Coronary artery disease involving native coronary artery of native heart without angina pectoris 05/06/2020    Fibrocystic breast disease 1990    Hyperlipidemia 1990    Hypothyroidism 01/25/2023    MVP (mitral valve prolapse) 1997    Osteopenia 02/2018    PONV (postoperative nausea and vomiting)     Vitamin D deficiency 03/2018       Past Surgical History:        Procedure Laterality Date    COLONOSCOPY  2011    [facility]    COLONOSCOPY  2006    [facility]    COLONOSCOPY  01/25/2017    Dr. Fallon    HYSTERECTOMY (CERVIX STATUS UNKNOWN)  1991    SHAMA AND BSO (CERVIX REMOVED)  1991    TONSILLECTOMY  1980       Social History:    Social History     Tobacco Use    Smoking status: Former     Current packs/day: 0.00     Average packs/day: 0.5

## 2024-04-16 ENCOUNTER — TELEPHONE (OUTPATIENT)
Dept: CARDIOLOGY CLINIC | Age: 79
End: 2024-04-16

## 2024-04-16 NOTE — ANESTHESIA POSTPROCEDURE EVALUATION
Department of Anesthesiology  Postprocedure Note    Patient: Keke Lee  MRN: [id number]  YOB: 1945  Date of evaluation: 4/16/2024    Procedure Summary       Date: 04/15/24 Room / Location: [facility] CATH LAB [address] [place] [address] CARDIAC CATH LAB    Anesthesia Start: 0715 Anesthesia Stop: 1002    Procedures:       Ablation A-fib w complete ep study      Ablation following A-fib addl Diagnosis:       A-fib (HCC)      (A-fib (HCC) [I48.91])    Providers: Luis Alberto Simon MD Responsible Provider: Misael Burnette MD    Anesthesia Type: general ASA Status: 3            Anesthesia Type: No value filed.    Joe Phase I: Joe Score: 10    Joe Phase II:      Anesthesia Post Evaluation    Patient location during evaluation: PACU  Patient participation: complete - patient participated  Level of consciousness: awake and alert  Airway patency: patent  Nausea & Vomiting: no nausea and no vomiting  Cardiovascular status: hemodynamically stable  Respiratory status: acceptable  Hydration status: euvolemic  Pain management: adequate    [facility]  POST-ANESTHESIA NOTE       Name:  Keke Lee                                         Age:  78 y.o.  MRN:  [id number]      Last Vitals:  /79   Pulse 83   Temp 97.5 °F (36.4 °C) (Temporal)   Resp 23   Ht 1.575 m (5' 2\")   Wt 71.6 kg (157 lb 13.6 oz)   SpO2 95%   Breastfeeding No   BMI 28.87 kg/m²   No data found.    Level of Consciousness:  Awake    Respiratory:  Stable    Oxygen Saturation:  Stable    Cardiovascular:  Stable    Hydration:  Adequate    PONV:  Stable    Post-op Pain:  Adequate analgesia    Post-op Assessment:  No apparent anesthetic complications    Additional Follow-Up / Treatment / Comment:  None    MISAEL BURNETTE MD  April 16, 2024   7:20 PM      There were no known notable events for this encounter.

## 2024-04-16 NOTE — TELEPHONE ENCOUNTER
PT CALLED THE OFFICE AND SHE SAID SHE FEELS GOOD   SHE SAID SHE CHECKED HER SITES AND NO HARD KNOTS  SHE DID RESUME HER COREG, AMIODARONE AND ELIQUIS  SHE SAID HER FACE IS A LITTLE RED TODAY AND SHE DOES NOT KNOW IF IT IS A SIDE OF EFFECT OF A MED SHE IS TAKING.   SHE SAID SHE WILL CALL OUR OFFICE IF SHE HAS ANY ISSUES OR IF THE FACIAL REDNESS GETS WORSE

## 2024-04-17 LAB
EKG ATRIAL RATE: 52 BPM
EKG ATRIAL RATE: 77 BPM
EKG P AXIS: -10 DEGREES
EKG P AXIS: -28 DEGREES
EKG P-R INTERVAL: 132 MS
EKG P-R INTERVAL: 146 MS
EKG Q-T INTERVAL: 394 MS
EKG Q-T INTERVAL: 472 MS
EKG QRS DURATION: 72 MS
EKG QRS DURATION: 74 MS
EKG QTC CALCULATION (BAZETT): 438 MS
EKG QTC CALCULATION (BAZETT): 445 MS
EKG R AXIS: 1 DEGREES
EKG R AXIS: 23 DEGREES
EKG T AXIS: -159 DEGREES
EKG T AXIS: 59 DEGREES
EKG VENTRICULAR RATE: 52 BPM
EKG VENTRICULAR RATE: 77 BPM

## 2024-04-22 ENCOUNTER — NURSE ONLY (OUTPATIENT)
Dept: CARDIOLOGY CLINIC | Age: 79
End: 2024-04-22

## 2024-04-22 NOTE — PROGRESS NOTES
S/P AFIB ABLATION FROM 4/15/24    .Pt here post afib ablation for incision check.     Bilateral groin, and right jugular sites free of hematoma, hard knots, redness, or drainage. Minimal bruising to sites  numbness or tingling noted.     Aware to call the office in anything changes.     Pt did resume her meds and she said she feels great

## 2024-05-02 ENCOUNTER — HOSPITAL ENCOUNTER (OUTPATIENT)
Age: 79
Discharge: HOME | End: 2024-05-02
Payer: MEDICARE

## 2024-05-02 DIAGNOSIS — E55.9 VITAMIN D DEFICIENCY: ICD-10-CM

## 2024-05-02 DIAGNOSIS — I50.22 CHF NYHA CLASS II, CHRONIC, SYSTOLIC (HCC): ICD-10-CM

## 2024-05-02 LAB
25(OH)D3 SERPL-MCNC: 50 NG/ML (ref 30–100)
ANION GAP SERPL CALC-SCNC: 12 MEQ/L (ref 8–16)
BUN SERPL-MCNC: 20 MG/DL (ref 7–22)
CALCIUM SERPL-MCNC: 9.7 MG/DL (ref 8.5–10.5)
CHLORIDE SERPL-SCNC: 103 MEQ/L (ref 98–111)
CO2 SERPL-SCNC: 26 MEQ/L (ref 23–33)
CREAT SERPL-MCNC: 1.2 MG/DL (ref 0.4–1.2)
GFR SERPL CREATININE-BSD FRML MDRD: 47 ML/MIN/1.73M2
GLUCOSE SERPL-MCNC: 106 MG/DL (ref 70–108)
POTASSIUM SERPL-SCNC: 4.3 MEQ/L (ref 3.5–5.2)
SODIUM SERPL-SCNC: 141 MEQ/L (ref 135–145)

## 2024-05-02 PROCEDURE — 36415 COLL VENOUS BLD VENIPUNCTURE: CPT

## 2024-05-02 PROCEDURE — 82306 VITAMIN D 25 HYDROXY: CPT

## 2024-05-02 PROCEDURE — 80048 BASIC METABOLIC PNL TOTAL CA: CPT

## 2024-05-05 DIAGNOSIS — E03.9 HYPOTHYROIDISM, UNSPECIFIED TYPE: ICD-10-CM

## 2024-05-05 DIAGNOSIS — E55.9 VITAMIN D DEFICIENCY: Primary | ICD-10-CM

## 2024-05-06 ENCOUNTER — TELEPHONE (OUTPATIENT)
Dept: FAMILY MEDICINE CLINIC | Age: 79
End: 2024-05-06

## 2024-05-06 ENCOUNTER — OFFICE VISIT (OUTPATIENT)
Dept: CARDIOLOGY CLINIC | Age: 79
End: 2024-05-06
Payer: MEDICARE

## 2024-05-06 VITALS
SYSTOLIC BLOOD PRESSURE: 121 MMHG | WEIGHT: 160.5 LBS | HEIGHT: 62 IN | DIASTOLIC BLOOD PRESSURE: 81 MMHG | HEART RATE: 73 BPM | BODY MASS INDEX: 29.53 KG/M2

## 2024-05-06 DIAGNOSIS — R00.1 SINUS BRADYCARDIA: ICD-10-CM

## 2024-05-06 DIAGNOSIS — I42.8 NONISCHEMIC CARDIOMYOPATHY (HCC): ICD-10-CM

## 2024-05-06 DIAGNOSIS — Z86.79 S/P ABLATION OF ATRIAL FLUTTER: ICD-10-CM

## 2024-05-06 DIAGNOSIS — I50.32 CHRONIC DIASTOLIC CONGESTIVE HEART FAILURE (HCC): Primary | ICD-10-CM

## 2024-05-06 DIAGNOSIS — I48.0 PAROXYSMAL ATRIAL FIBRILLATION (HCC): ICD-10-CM

## 2024-05-06 DIAGNOSIS — Z98.890 HISTORY OF CARDIAC CATHETERIZATION: ICD-10-CM

## 2024-05-06 DIAGNOSIS — Z98.890 S/P ABLATION OF ATRIAL FLUTTER: ICD-10-CM

## 2024-05-06 DIAGNOSIS — Z98.890 S/P CARDIAC CATH: ICD-10-CM

## 2024-05-06 DIAGNOSIS — E78.00 PURE HYPERCHOLESTEROLEMIA: ICD-10-CM

## 2024-05-06 PROCEDURE — G8427 DOCREV CUR MEDS BY ELIG CLIN: HCPCS | Performed by: INTERNAL MEDICINE

## 2024-05-06 PROCEDURE — 1123F ACP DISCUSS/DSCN MKR DOCD: CPT | Performed by: INTERNAL MEDICINE

## 2024-05-06 PROCEDURE — 99214 OFFICE O/P EST MOD 30 MIN: CPT | Performed by: INTERNAL MEDICINE

## 2024-05-06 PROCEDURE — 1090F PRES/ABSN URINE INCON ASSESS: CPT | Performed by: INTERNAL MEDICINE

## 2024-05-06 PROCEDURE — G8417 CALC BMI ABV UP PARAM F/U: HCPCS | Performed by: INTERNAL MEDICINE

## 2024-05-06 PROCEDURE — 1036F TOBACCO NON-USER: CPT | Performed by: INTERNAL MEDICINE

## 2024-05-06 PROCEDURE — G8399 PT W/DXA RESULTS DOCUMENT: HCPCS | Performed by: INTERNAL MEDICINE

## 2024-05-06 NOTE — TELEPHONE ENCOUNTER
----- Message from Jermaine Cardoso MD sent at 5/5/2024  8:34 PM EDT -----  The vit D looked well. Continue the current meds and repeat the non fasting labs in early November

## 2024-05-22 RX ORDER — AMIODARONE HYDROCHLORIDE 100 MG/1
100 TABLET ORAL DAILY
Qty: 90 TABLET | Refills: 2 | Status: SHIPPED | OUTPATIENT
Start: 2024-05-22

## 2024-05-28 NOTE — PATIENT INSTRUCTIONS
You may receive a survey regarding the care you received during your visit.  Your input is valuable to us.  We encourage you to complete and return your survey.  We hope you will choose us in the future for your healthcare needs.    Thank you for choosing [facility]!    Your Medical Assistant Today:  Tita DE JESUS      Your RN Today:  Kelsey DE JESUS  Your Provider for Today: Dr. Simon  Ph. [phone]     Have a Great Day!        Stop Amiodarone in 1 month on 6/29/24

## 2024-05-29 ENCOUNTER — OFFICE VISIT (OUTPATIENT)
Dept: CARDIOLOGY CLINIC | Age: 79
End: 2024-05-29
Payer: MEDICARE

## 2024-05-29 VITALS
HEART RATE: 64 BPM | SYSTOLIC BLOOD PRESSURE: 120 MMHG | WEIGHT: 164 LBS | DIASTOLIC BLOOD PRESSURE: 76 MMHG | HEIGHT: 62 IN | OXYGEN SATURATION: 95 % | BODY MASS INDEX: 30.18 KG/M2

## 2024-05-29 DIAGNOSIS — I48.0 PAROXYSMAL ATRIAL FIBRILLATION (HCC): Primary | ICD-10-CM

## 2024-05-29 PROCEDURE — 1090F PRES/ABSN URINE INCON ASSESS: CPT | Performed by: INTERNAL MEDICINE

## 2024-05-29 PROCEDURE — G8427 DOCREV CUR MEDS BY ELIG CLIN: HCPCS | Performed by: INTERNAL MEDICINE

## 2024-05-29 PROCEDURE — 93000 ELECTROCARDIOGRAM COMPLETE: CPT | Performed by: INTERNAL MEDICINE

## 2024-05-29 PROCEDURE — 1123F ACP DISCUSS/DSCN MKR DOCD: CPT | Performed by: INTERNAL MEDICINE

## 2024-05-29 PROCEDURE — G8417 CALC BMI ABV UP PARAM F/U: HCPCS | Performed by: INTERNAL MEDICINE

## 2024-05-29 PROCEDURE — 1036F TOBACCO NON-USER: CPT | Performed by: INTERNAL MEDICINE

## 2024-05-29 PROCEDURE — 99214 OFFICE O/P EST MOD 30 MIN: CPT | Performed by: INTERNAL MEDICINE

## 2024-05-29 PROCEDURE — G8399 PT W/DXA RESULTS DOCUMENT: HCPCS | Performed by: INTERNAL MEDICINE

## 2024-05-29 RX ORDER — PENICILLIN V POTASSIUM 500 MG/1
500 TABLET ORAL 4 TIMES DAILY
COMMUNITY

## 2024-05-29 NOTE — PROGRESS NOTES
oriented. No distress.  EYES: No pallor or icterus.  ENT: No cyanosis.  VESSELS: No jugular venous distension or carotid bruits.  HEART: Normal S1/S2. No murmur, rub or gallop.  LUNGS: Clear to auscultation.  ABDOMEN: Soft and non-tender.   EXTREMITIES: No lower extremity edema.  Groins are soft and nontender.  No bruit  NEUROLOGICAL: Grossly normal.     Laboratory And Diagnostic Data  I have personally reviewed and interpreted the results of the following diagnostic testing    Lab Results   Component Value Date    WBC 6.6 04/15/2024    HGB 13.9 04/15/2024    HCT 42.4 04/15/2024     04/15/2024    CHOL 196 07/05/2023    TRIG 89 07/05/2023    HDL 81 07/05/2023    LDLDIRECT 168.41 12/10/2021    ALT 14 07/05/2023    AST 16 07/05/2023     05/02/2024    K 4.3 05/02/2024     05/02/2024    CREATININE 1.2 05/02/2024    BUN 20 05/02/2024    CO2 26 05/02/2024    TSH 1.910 11/06/2023    INR 0.96 04/15/2024    GLUF 106 06/03/2021    LABA1C 5.7 01/27/2023     Echocardiogram 3/20/2022: LVEF 55-60%, LVWT 0.8 cm, LAD/BRITTNEY 25. No significant valvular abnormalities. DIAMANTE 1/23/2023: LVEF 25%.   ECG 1/23/2023: AF with RVR (167 BPM).  ECG 9/13/2022: Sinus bradycardia, 47 beats per.  ECG 4/29/24: Sinus bradycardia, 60 bpm.  Coronary angiogram 1/23/2023: Mild CAD  Holter monitor: Sinus rhythm, HR 50 to 113 bpm. Average heart rate 49.2 bpm     Impression:  Paroxysmal atrial fibrillation and atrial flutter s/p catheter ablation.  [id number] score 4 (age, sex, HTN).  On amiodarone and apixaban.  Symptomatic sinus bradycardia, resolved.   Tachycardia induced cardiomyopathy, resolved.  LVEF 55%.  HTN, obesity and mild TR.  Fatigue and lightheadedness.     Assessment And Recommendations:  Keke is doing well.  Symptoms have resolved.  In sinus rhythm.  No groin complications.  Discontinue amiodarone month.  Follow-up in a year.       Thank you for allowing me to participate in the care of your patient. Please call me if you

## 2024-06-03 RX ORDER — ASPIRIN 81 MG/1
TABLET, CHEWABLE ORAL
Qty: 90 TABLET | Refills: 3 | Status: SHIPPED | OUTPATIENT
Start: 2024-06-03

## 2024-06-12 ENCOUNTER — TELEPHONE (OUTPATIENT)
Dept: CARDIOLOGY CLINIC | Age: 79
End: 2024-06-12

## 2024-06-12 NOTE — TELEPHONE ENCOUNTER
Pre op Risk Assessment, Dr. Janeth ARVIZU, pt follows with Dr. Simon as well.    Procedure Upper tooth extraction  Physician Dr. Knight  Date of surgery/procedure TBD    Last OV 5/29/2024 with Dr. Simon  Last Stress 1/25/2023  Last Echo 3/18/2024  Last Cath Ablation 4/15/2024  Last Stent   Is patient on blood thinners Eliquis, ASA  Hold Meds/how many days     Fax to [phone]

## 2024-08-01 DIAGNOSIS — E78.00 PURE HYPERCHOLESTEROLEMIA: ICD-10-CM

## 2024-08-02 RX ORDER — ROSUVASTATIN CALCIUM 20 MG/1
TABLET, COATED ORAL
Qty: 90 TABLET | Refills: 1 | Status: SHIPPED | OUTPATIENT
Start: 2024-08-02

## 2024-08-19 RX ORDER — CARVEDILOL 6.25 MG/1
6.25 TABLET ORAL 2 TIMES DAILY
Qty: 180 TABLET | Refills: 0 | Status: SHIPPED | OUTPATIENT
Start: 2024-08-19

## 2024-10-24 NOTE — TELEPHONE ENCOUNTER
Keke Lee called requesting a refill on the following medications:  Requested Prescriptions     Pending Prescriptions Disp Refills    sacubitril-valsartan (ENTRESTO) 24-26 MG per tablet 90 tablet 3     Sig: Take 0.5 tablets by mouth 2 times daily     Pharmacy verified:    [id number] - [place], [place] - [address] - P [phone] - F [phone]     Date of last visit:   Date of next visit (if applicable): 4/7/2025    Pt completely out of medication

## 2024-11-11 ENCOUNTER — OFFICE VISIT (OUTPATIENT)
Dept: CARDIOLOGY CLINIC | Age: 79
End: 2024-11-11

## 2024-11-11 VITALS
HEART RATE: 66 BPM | BODY MASS INDEX: 31.1 KG/M2 | SYSTOLIC BLOOD PRESSURE: 117 MMHG | WEIGHT: 169 LBS | DIASTOLIC BLOOD PRESSURE: 76 MMHG | HEIGHT: 62 IN

## 2024-11-11 DIAGNOSIS — I50.32 CHRONIC DIASTOLIC CONGESTIVE HEART FAILURE (HCC): Primary | ICD-10-CM

## 2024-11-11 DIAGNOSIS — I48.0 PAROXYSMAL ATRIAL FIBRILLATION (HCC): ICD-10-CM

## 2024-11-11 DIAGNOSIS — Z98.890 S/P ABLATION OF ATRIAL FLUTTER: ICD-10-CM

## 2024-11-11 DIAGNOSIS — E78.00 PURE HYPERCHOLESTEROLEMIA: ICD-10-CM

## 2024-11-11 DIAGNOSIS — Z86.79 S/P ABLATION OF ATRIAL FLUTTER: ICD-10-CM

## 2024-11-11 DIAGNOSIS — Z98.890 S/P CARDIAC CATH: ICD-10-CM

## 2024-11-11 DIAGNOSIS — I42.8 NONISCHEMIC CARDIOMYOPATHY (HCC): ICD-10-CM

## 2024-11-11 DIAGNOSIS — R00.1 SINUS BRADYCARDIA: ICD-10-CM

## 2024-11-11 NOTE — PROGRESS NOTES
Called [facility] to see if we can add-on Hemoglobin A1C which was ordered by Dr. Pamela Vela on 8/25/2022 and was not drawn by Patrice Salvador to Claudetta Pollen to see if we can add it, Hemoglobin-A1C dx hyperglycemia [R73.9]   Momo submitting request, once pull specimen and confirm will send authorization form and if they cannot draw it we will receive a report saying cannot be drawn. bid    Cardiomyopathy: improving, no CHF symptoms, no change in clinical condition. Will need periodic echocardiograms depending on symptoms.  Possible tachy mediated  Limited echo 3/20/23-EF 55% ( improved from Ef 30% jan 2023)  Cont entresto and coreg  Her LVEF has normalized following restoration of the sinus rhythm.      Low Normal  sbp 110 to 120  Hx of Sinus bradycardia  Better after coreg 6.25 po bid   no dizziness    Congestive heart failure: no evidence of fluid overload today, no recent hospitalization for CHF  Lasix initiated in jan 2023 for CHF and low EF  Now EF improved  Off   lasix 20 mg po  M and F and from [facility] and then consider PRN  Off  kcl 10 meq po M and F from [facility]  BMP WNL    Hx of BPPV  More in night when in bed and turns to Right  Resolved by itself    Moderate Nonobstructive CAD  On med Rx    Patient Seen, Chart, Consults notes, Labs, Radiology studies reviewed.     Hyperlipidemia: on statins, followed periodically. Patient need periodic lipid and liver profile.  Cont crestor    Discussed use, benefit, and side effects of prescribed medications. All patient questions answered. Pt voiced understanding. Instructed to continue current medications, diet and exercise. Continue risk factor modification and medical management. Patient agreed with treatment plan. Follow up as directed.    Under F/u chf clinic     The patient is  Overall stable and feel better    The patient is advised to attempt to improve diet and exercise patterns to aid in medical management of this problem.  Advised more plant based nutrition/meditarrean diet   Advised patient to call office or seek immediate medical attention if there is any new onset of  any chest pain, sob, palpitations, lightheadedness, dizziness, orthopnea, PND or pedal edema.   All medication side effects were discussed in details.       RTC  6 months      Valentina Fowler MD,MD,FACC

## 2025-01-09 DIAGNOSIS — E78.00 PURE HYPERCHOLESTEROLEMIA: ICD-10-CM

## 2025-01-09 NOTE — TELEPHONE ENCOUNTER
Keke Lee called requesting a refill on the following medications:  Requested Prescriptions     Pending Prescriptions Disp Refills    rosuvastatin (CRESTOR) 20 MG tablet 90 tablet 1     Sig: TAKE 1 TABLET DAILY     [facility]      Date of last visit: 11/11/24  Date of next visit (if applicable): 5/12/2025

## 2025-01-10 DIAGNOSIS — E78.00 PURE HYPERCHOLESTEROLEMIA: ICD-10-CM

## 2025-01-10 RX ORDER — ROSUVASTATIN CALCIUM 20 MG/1
TABLET, COATED ORAL
Qty: 90 TABLET | Refills: 1 | Status: SHIPPED | OUTPATIENT
Start: 2025-01-10

## 2025-01-20 RX ORDER — CARVEDILOL 6.25 MG/1
6.25 TABLET ORAL 2 TIMES DAILY
Qty: 180 TABLET | Refills: 1 | Status: SHIPPED | OUTPATIENT
Start: 2025-01-20

## 2025-03-25 RX ORDER — APIXABAN 5 MG/1
5 TABLET, FILM COATED ORAL 2 TIMES DAILY
Qty: 180 TABLET | Refills: 0 | Status: SHIPPED | OUTPATIENT
Start: 2025-03-25

## 2025-03-26 DIAGNOSIS — E03.9 HYPOTHYROIDISM, UNSPECIFIED TYPE: ICD-10-CM

## 2025-03-26 RX ORDER — LEVOTHYROXINE SODIUM 25 UG/1
25 TABLET ORAL DAILY
Qty: 90 TABLET | Refills: 3 | OUTPATIENT
Start: 2025-03-26

## 2025-03-26 NOTE — TELEPHONE ENCOUNTER
She was to have had a non fasting vit D and TSH done in November. I would like her to do those before the RF. Will she need a small Rx to last till the results come back.

## 2025-03-26 NOTE — TELEPHONE ENCOUNTER
Pt states she will go to [facility] to get the labs. She does not need a small RX at this time.

## 2025-03-28 ENCOUNTER — HOSPITAL ENCOUNTER (OUTPATIENT)
Age: 80
Discharge: HOME | End: 2025-03-28
Payer: MEDICARE

## 2025-03-28 DIAGNOSIS — E03.9 HYPOTHYROIDISM, UNSPECIFIED TYPE: ICD-10-CM

## 2025-03-28 DIAGNOSIS — E55.9 VITAMIN D DEFICIENCY: ICD-10-CM

## 2025-03-28 LAB
25(OH)D3 SERPL-MCNC: 51 NG/ML (ref 30–100)
TSH SERPL DL<=0.05 MIU/L-ACNC: 2.52 UIU/ML (ref 0.27–4.2)

## 2025-03-28 PROCEDURE — 84443 ASSAY THYROID STIM HORMONE: CPT

## 2025-03-28 PROCEDURE — 36415 COLL VENOUS BLD VENIPUNCTURE: CPT

## 2025-03-28 PROCEDURE — 82306 VITAMIN D 25 HYDROXY: CPT

## 2025-04-03 ENCOUNTER — RESULTS FOLLOW-UP (OUTPATIENT)
Dept: FAMILY MEDICINE CLINIC | Age: 80
End: 2025-04-03

## 2025-04-04 DIAGNOSIS — E03.9 HYPOTHYROIDISM, UNSPECIFIED TYPE: ICD-10-CM

## 2025-04-04 RX ORDER — LEVOTHYROXINE SODIUM 25 UG/1
25 TABLET ORAL DAILY
Qty: 90 TABLET | Refills: 3 | Status: SHIPPED | OUTPATIENT
Start: 2025-04-04

## 2025-04-04 NOTE — TELEPHONE ENCOUNTER
----- Message from Dr. Jermaine Cardoso MD sent at 4/3/2025  8:22 PM EDT -----  Let her know that the TSH was fine and where would she want the levothyroxine RF sent to?

## 2025-04-04 NOTE — TELEPHONE ENCOUNTER
Keke informed by Daniela. She would like RF to go to [facility].

## 2025-04-07 ENCOUNTER — OFFICE VISIT (OUTPATIENT)
Dept: CARDIOLOGY CLINIC | Age: 80
End: 2025-04-07
Payer: MEDICARE

## 2025-04-07 VITALS
DIASTOLIC BLOOD PRESSURE: 70 MMHG | HEIGHT: 62 IN | HEART RATE: 65 BPM | OXYGEN SATURATION: 99 % | SYSTOLIC BLOOD PRESSURE: 136 MMHG | WEIGHT: 169 LBS | BODY MASS INDEX: 31.1 KG/M2

## 2025-04-07 DIAGNOSIS — I50.32 CHF NYHA CLASS I, CHRONIC, DIASTOLIC (HCC): Primary | ICD-10-CM

## 2025-04-07 DIAGNOSIS — I48.0 PAROXYSMAL ATRIAL FIBRILLATION (HCC): ICD-10-CM

## 2025-04-07 PROCEDURE — 99214 OFFICE O/P EST MOD 30 MIN: CPT | Performed by: NURSE PRACTITIONER

## 2025-04-07 PROCEDURE — 1036F TOBACCO NON-USER: CPT | Performed by: NURSE PRACTITIONER

## 2025-04-07 PROCEDURE — G8417 CALC BMI ABV UP PARAM F/U: HCPCS | Performed by: NURSE PRACTITIONER

## 2025-04-07 PROCEDURE — 1159F MED LIST DOCD IN RCRD: CPT | Performed by: NURSE PRACTITIONER

## 2025-04-07 PROCEDURE — 1123F ACP DISCUSS/DSCN MKR DOCD: CPT | Performed by: NURSE PRACTITIONER

## 2025-04-07 PROCEDURE — G8399 PT W/DXA RESULTS DOCUMENT: HCPCS | Performed by: NURSE PRACTITIONER

## 2025-04-07 PROCEDURE — G8427 DOCREV CUR MEDS BY ELIG CLIN: HCPCS | Performed by: NURSE PRACTITIONER

## 2025-04-07 PROCEDURE — 1090F PRES/ABSN URINE INCON ASSESS: CPT | Performed by: NURSE PRACTITIONER

## 2025-04-07 ASSESSMENT — ENCOUNTER SYMPTOMS
SHORTNESS OF BREATH: 0
ABDOMINAL DISTENTION: 0
COUGH: 0

## 2025-04-07 NOTE — PROGRESS NOTES
Calcium Carbonate (CALCIUM 600 PO) Take by mouth      Multiple Vitamins-Minerals (THERAPEUTIC MULTIVITAMIN-MINERALS) tablet Take 1 tablet by mouth daily      rosuvastatin (CRESTOR) 20 MG tablet TAKE 1 TABLET DAILY 90 tablet 1     No current facility-administered medications for this visit.     No Known Allergies    SUBJECTIVE:   Review of Systems   Constitutional:  Negative for activity change, appetite change and fatigue.   Respiratory:  Negative for cough and shortness of breath.    Cardiovascular:  Negative for chest pain, palpitations and leg swelling.   Gastrointestinal:  Negative for abdominal distention.   Neurological:  Negative for dizziness, weakness, light-headedness and headaches.   Hematological:  Negative for adenopathy.   Psychiatric/Behavioral:  Negative for sleep disturbance.        OBJECTIVE:   Today's Vitals:  /70   Pulse 65   Ht 1.575 m (5' 2\")   Wt 76.7 kg (169 lb)   SpO2 99%   BMI 30.91 kg/m²     Physical Exam  Vitals reviewed.   Constitutional:       General: She is not in acute distress.     Appearance: Normal appearance. She is well-developed. She is not diaphoretic.   HENT:      Head: Normocephalic and atraumatic.   Eyes:      Conjunctiva/sclera: Conjunctivae normal.   Neck:      Comments: No JVD  Cardiovascular:      Rate and Rhythm: Normal rate and regular rhythm.      Heart sounds: Normal heart sounds. No murmur heard.     Comments:     Pulmonary:      Effort: Pulmonary effort is normal. No respiratory distress.      Breath sounds: Normal breath sounds. No wheezing or rales.   Abdominal:      General: Bowel sounds are normal. There is no distension.      Palpations: Abdomen is soft.      Tenderness: There is no abdominal tenderness.   Musculoskeletal:         General: Normal range of motion.      Cervical back: Normal range of motion and neck supple.      Right lower leg: No edema.      Left lower leg: No edema.   Skin:     General: Skin is warm and dry.      Capillary Refill:

## 2025-04-07 NOTE — PATIENT INSTRUCTIONS
You may receive a survey regarding the care you received during your visit.  Your input is valuable to us.  We encourage you to complete and return your survey.  We hope you will choose us in the future for your healthcare needs.    Your nurses today were Meaghan Bunn and Lakeisha.  Office hours:   Mon-Thurs 8-4:30  Friday 8-12  Phone: [phone]    Continue:  Continue current medications  Daily weights and record  Fluid restriction of 2 Liters per day  Limit sodium in diet to around 5389-5040 mg/day  Monitor BP    Call the [facility] for any of the following symptoms:   Weight gain of 3 pounds in 1 day or 5 pounds in 1 week  Increased shortness of breath  Shortness of breath while laying down  Chest pain  Swelling in feet, ankles or legs  Bloating in abdomen  Fatigue

## 2025-04-17 DIAGNOSIS — F41.9 ANXIETY DISORDER, UNSPECIFIED TYPE: ICD-10-CM

## 2025-04-17 RX ORDER — SERTRALINE HYDROCHLORIDE 25 MG/1
25 TABLET, FILM COATED ORAL DAILY
Qty: 90 TABLET | Refills: 3 | OUTPATIENT
Start: 2025-04-17

## 2025-04-17 NOTE — TELEPHONE ENCOUNTER
Date of last visit:  3/7/2024  Date of next visit:  Visit date not found    Requested Prescriptions     Pending Prescriptions Disp Refills    sertraline (ZOLOFT) 25 MG tablet [Pharmacy Med Name: SERTRALINE HCL TABS 25MG] 90 tablet 3     Sig: TAKE 1 TABLET DAILY

## 2025-04-17 NOTE — TELEPHONE ENCOUNTER
It's been over a year since I've seen her. It would be good to see her now and we could do the AWV too. Does she have enough to last till she gets in?

## 2025-04-17 NOTE — TELEPHONE ENCOUNTER
Keke informed by Phone. She stated she needs to make an Appt with [facility] first and then she will call and make an Appt with Dr Cardoso. She has enough to least til she can get in in the next couple weeks.

## 2025-04-18 SDOH — ECONOMIC STABILITY: FOOD INSECURITY: WITHIN THE PAST 12 MONTHS, THE FOOD YOU BOUGHT JUST DIDN'T LAST AND YOU DIDN'T HAVE MONEY TO GET MORE.: NEVER TRUE

## 2025-04-18 SDOH — ECONOMIC STABILITY: FOOD INSECURITY: WITHIN THE PAST 12 MONTHS, YOU WORRIED THAT YOUR FOOD WOULD RUN OUT BEFORE YOU GOT MONEY TO BUY MORE.: NEVER TRUE

## 2025-04-18 SDOH — ECONOMIC STABILITY: INCOME INSECURITY: IN THE LAST 12 MONTHS, WAS THERE A TIME WHEN YOU WERE NOT ABLE TO PAY THE MORTGAGE OR RENT ON TIME?: NO

## 2025-04-18 SDOH — ECONOMIC STABILITY: TRANSPORTATION INSECURITY
IN THE PAST 12 MONTHS, HAS THE LACK OF TRANSPORTATION KEPT YOU FROM MEDICAL APPOINTMENTS OR FROM GETTING MEDICATIONS?: NO

## 2025-04-18 SDOH — ECONOMIC STABILITY: TRANSPORTATION INSECURITY
IN THE PAST 12 MONTHS, HAS LACK OF TRANSPORTATION KEPT YOU FROM MEETINGS, WORK, OR FROM GETTING THINGS NEEDED FOR DAILY LIVING?: NO

## 2025-04-22 SDOH — HEALTH STABILITY: PHYSICAL HEALTH: ON AVERAGE, HOW MANY MINUTES DO YOU ENGAGE IN EXERCISE AT THIS LEVEL?: 20 MIN

## 2025-04-22 SDOH — HEALTH STABILITY: PHYSICAL HEALTH: ON AVERAGE, HOW MANY DAYS PER WEEK DO YOU ENGAGE IN MODERATE TO STRENUOUS EXERCISE (LIKE A BRISK WALK)?: 3 DAYS

## 2025-04-22 ASSESSMENT — PATIENT HEALTH QUESTIONNAIRE - PHQ9
SUM OF ALL RESPONSES TO PHQ QUESTIONS 1-9: 0
SUM OF ALL RESPONSES TO PHQ QUESTIONS 1-9: 0
2. FEELING DOWN, DEPRESSED OR HOPELESS: NOT AT ALL
SUM OF ALL RESPONSES TO PHQ QUESTIONS 1-9: 0
1. LITTLE INTEREST OR PLEASURE IN DOING THINGS: NOT AT ALL
SUM OF ALL RESPONSES TO PHQ QUESTIONS 1-9: 0

## 2025-05-01 DIAGNOSIS — E03.9 HYPOTHYROIDISM, UNSPECIFIED TYPE: ICD-10-CM

## 2025-05-02 RX ORDER — LEVOTHYROXINE SODIUM 25 UG/1
25 TABLET ORAL DAILY
Qty: 90 TABLET | Refills: 3 | Status: SHIPPED | OUTPATIENT
Start: 2025-05-02

## 2025-05-02 NOTE — TELEPHONE ENCOUNTER
Date of last visit:  3/7/2024  Date of next visit:  6/19/2025    Requested Prescriptions     Pending Prescriptions Disp Refills    levothyroxine (SYNTHROID) 25 MCG tablet [Pharmacy Med Name: L-THYROXINE (SYNTHROID) TABS 25MCG] 90 tablet 3     Sig: Take 1 tablet by mouth Daily

## 2025-05-05 RX ORDER — ASPIRIN 81 MG/1
TABLET, CHEWABLE ORAL
Qty: 90 TABLET | Refills: 0 | Status: SHIPPED | OUTPATIENT
Start: 2025-05-05

## 2025-05-09 ENCOUNTER — HOSPITAL ENCOUNTER (OUTPATIENT)
Age: 80
Discharge: HOME | End: 2025-05-09
Payer: MEDICARE

## 2025-05-09 DIAGNOSIS — I50.32 CHF NYHA CLASS I, CHRONIC, DIASTOLIC (HCC): ICD-10-CM

## 2025-05-09 LAB
ANION GAP SERPL CALC-SCNC: 12 MEQ/L (ref 8–16)
BUN SERPL-MCNC: 16 MG/DL (ref 8–23)
CALCIUM SERPL-MCNC: 9.7 MG/DL (ref 8.8–10.2)
CHLORIDE SERPL-SCNC: 106 MEQ/L (ref 98–111)
CO2 SERPL-SCNC: 25 MEQ/L (ref 22–29)
CREAT SERPL-MCNC: 1 MG/DL (ref 0.5–0.9)
GFR SERPL CREATININE-BSD FRML MDRD: 58 ML/MIN/1.73M2
GLUCOSE SERPL-MCNC: 89 MG/DL (ref 74–109)
MAGNESIUM SERPL-MCNC: 2.3 MG/DL (ref 1.6–2.6)
POTASSIUM SERPL-SCNC: 4.1 MEQ/L (ref 3.5–5.2)
SODIUM SERPL-SCNC: 143 MEQ/L (ref 135–145)

## 2025-05-09 PROCEDURE — 80048 BASIC METABOLIC PNL TOTAL CA: CPT

## 2025-05-09 PROCEDURE — 83735 ASSAY OF MAGNESIUM: CPT

## 2025-05-09 PROCEDURE — 36415 COLL VENOUS BLD VENIPUNCTURE: CPT

## 2025-05-12 ENCOUNTER — RESULTS FOLLOW-UP (OUTPATIENT)
Dept: CARDIOLOGY CLINIC | Age: 80
End: 2025-05-12

## 2025-05-12 ENCOUNTER — OFFICE VISIT (OUTPATIENT)
Dept: CARDIOLOGY CLINIC | Age: 80
End: 2025-05-12
Payer: MEDICARE

## 2025-05-12 VITALS
SYSTOLIC BLOOD PRESSURE: 129 MMHG | DIASTOLIC BLOOD PRESSURE: 75 MMHG | BODY MASS INDEX: 31.76 KG/M2 | HEART RATE: 57 BPM | HEIGHT: 62 IN | WEIGHT: 172.6 LBS

## 2025-05-12 DIAGNOSIS — I48.0 PAROXYSMAL ATRIAL FIBRILLATION (HCC): Primary | ICD-10-CM

## 2025-05-12 DIAGNOSIS — Z98.890 S/P ABLATION OF ATRIAL FLUTTER: ICD-10-CM

## 2025-05-12 DIAGNOSIS — I50.32 CHRONIC DIASTOLIC CONGESTIVE HEART FAILURE (HCC): ICD-10-CM

## 2025-05-12 DIAGNOSIS — I42.8 NONISCHEMIC CARDIOMYOPATHY (HCC): ICD-10-CM

## 2025-05-12 DIAGNOSIS — E78.00 PURE HYPERCHOLESTEROLEMIA: ICD-10-CM

## 2025-05-12 DIAGNOSIS — Z98.890 S/P CARDIAC CATH: ICD-10-CM

## 2025-05-12 DIAGNOSIS — Z86.79 S/P ABLATION OF ATRIAL FLUTTER: ICD-10-CM

## 2025-05-12 DIAGNOSIS — R00.1 SINUS BRADYCARDIA: ICD-10-CM

## 2025-05-12 PROCEDURE — 93000 ELECTROCARDIOGRAM COMPLETE: CPT | Performed by: INTERNAL MEDICINE

## 2025-05-12 PROCEDURE — 1160F RVW MEDS BY RX/DR IN RCRD: CPT | Performed by: INTERNAL MEDICINE

## 2025-05-12 PROCEDURE — G8417 CALC BMI ABV UP PARAM F/U: HCPCS | Performed by: INTERNAL MEDICINE

## 2025-05-12 PROCEDURE — 1123F ACP DISCUSS/DSCN MKR DOCD: CPT | Performed by: INTERNAL MEDICINE

## 2025-05-12 PROCEDURE — G8427 DOCREV CUR MEDS BY ELIG CLIN: HCPCS | Performed by: INTERNAL MEDICINE

## 2025-05-12 PROCEDURE — 99214 OFFICE O/P EST MOD 30 MIN: CPT | Performed by: INTERNAL MEDICINE

## 2025-05-12 PROCEDURE — G8399 PT W/DXA RESULTS DOCUMENT: HCPCS | Performed by: INTERNAL MEDICINE

## 2025-05-12 PROCEDURE — 1090F PRES/ABSN URINE INCON ASSESS: CPT | Performed by: INTERNAL MEDICINE

## 2025-05-12 PROCEDURE — 1036F TOBACCO NON-USER: CPT | Performed by: INTERNAL MEDICINE

## 2025-05-12 PROCEDURE — 1159F MED LIST DOCD IN RCRD: CPT | Performed by: INTERNAL MEDICINE

## 2025-05-12 NOTE — PATIENT INSTRUCTIONS
You may receive a survey regarding the care you received during your visit. We encourage you to complete and return your survey, as your input is valuable to us. We hope you will choose us in the future for your healthcare needs. Thank you!    Your Medical Assistant today: Daniel Bentley  Thank you for coming to our office! It was a pleasure to serve you.

## 2025-05-12 NOTE — PROGRESS NOTES
Chief Complaint   Patient presents with    6 Month Follow-Up     Originally Patient is here due to abnormal monitor strips.  Seen by dr. chowdhury for palpitation and event show some abnormal Rhythm and hence came to see me here -Dr. chowdhury out today  Dr. Chiang's office called would like patient seen for abnormal monitor strips, notified Dr. Chowdhury out this week would like another physician to address.  Appt made for 3-27-18, patient declined she will come Thursday    Went to hospital for worsening of sob and atr fib with RVR 1/23/2023  Went in to NSR on her own before Cardioversion while on amiodarone  Noted to have new low EF 25 to 30%- probably related to tachy    Zoll Interrogation  2/3/2023  11 am went in to atr fib with  to 170 bpm for 87 sec and resolved  Recurrence of atr fib with RVR  Going in and out of atr fib in hospital isnus susy with atr fib rvr in hospital        6 month follow up    EKG completed today in office    Had ablation for atr fib April 2024 and did well    Sob on exertion -  much better    Denied cp, palpitation, dizziness or edema    Never had syncope      Patient Active Problem List   Diagnosis    Pure hypercholesterolemia    Adenomatous polyp of colon    Vitamin D deficiency    Osteopenia    Paroxysmal atrial fibrillation (HCC)    BPV (benign positional vertigo), right    Dyslipidemia    Postmenopausal state    SOB (shortness of breath) on exertion    S/P cardiac cath 2018 - nonobstructive lesion    Atrial fibrillation with rapid ventricular response (HCC)    Atrial fibrillation with RVR (HCC)    Nonischemic cardiomyopathy (HCC)    Hypothyroidism    Sinus bradycardia    Chronic diastolic congestive heart failure (HCC)    A-fib (HCC)    S/P ablation of atrial flutter       Past Surgical History:   Procedure Laterality Date    CARDIAC CATHETERIZATION      COLONOSCOPY  2011    [facility]    COLONOSCOPY  2006    [facility]    COLONOSCOPY  01/25/2017    Dr. Fallon    EP DEVICE PROCEDURE N/A

## 2025-06-09 SDOH — HEALTH STABILITY: PHYSICAL HEALTH: ON AVERAGE, HOW MANY MINUTES DO YOU ENGAGE IN EXERCISE AT THIS LEVEL?: 10 MIN

## 2025-06-09 SDOH — HEALTH STABILITY: PHYSICAL HEALTH: ON AVERAGE, HOW MANY DAYS PER WEEK DO YOU ENGAGE IN MODERATE TO STRENUOUS EXERCISE (LIKE A BRISK WALK)?: 1 DAY

## 2025-06-09 ASSESSMENT — LIFESTYLE VARIABLES
HOW OFTEN DO YOU HAVE SIX OR MORE DRINKS ON ONE OCCASION: 1
HOW OFTEN DO YOU HAVE A DRINK CONTAINING ALCOHOL: 2
HOW MANY STANDARD DRINKS CONTAINING ALCOHOL DO YOU HAVE ON A TYPICAL DAY: 1 OR 2
HOW OFTEN DO YOU HAVE A DRINK CONTAINING ALCOHOL: MONTHLY OR LESS
HOW MANY STANDARD DRINKS CONTAINING ALCOHOL DO YOU HAVE ON A TYPICAL DAY: 1

## 2025-06-18 RX ORDER — APIXABAN 5 MG/1
5 TABLET, FILM COATED ORAL 2 TIMES DAILY
Qty: 180 TABLET | Refills: 1 | Status: SHIPPED | OUTPATIENT
Start: 2025-06-18

## 2025-06-19 ENCOUNTER — OFFICE VISIT (OUTPATIENT)
Dept: FAMILY MEDICINE CLINIC | Age: 80
End: 2025-06-19

## 2025-06-19 VITALS
HEART RATE: 68 BPM | RESPIRATION RATE: 16 BRPM | BODY MASS INDEX: 32.11 KG/M2 | SYSTOLIC BLOOD PRESSURE: 98 MMHG | DIASTOLIC BLOOD PRESSURE: 52 MMHG | HEIGHT: 62 IN | WEIGHT: 174.5 LBS

## 2025-06-19 DIAGNOSIS — E03.9 HYPOTHYROIDISM, UNSPECIFIED TYPE: ICD-10-CM

## 2025-06-19 DIAGNOSIS — Z23 NEED FOR PROPHYLACTIC VACCINATION AGAINST STREPTOCOCCUS PNEUMONIAE (PNEUMOCOCCUS): ICD-10-CM

## 2025-06-19 DIAGNOSIS — Z00.00 MEDICARE ANNUAL WELLNESS VISIT, SUBSEQUENT: Primary | ICD-10-CM

## 2025-06-19 NOTE — PROGRESS NOTES
Immunizations Administered       Name Date Dose Route    Pneumococcal, PCV20, PREVNAR 20, (age 6w+), IM, 0.5mL 6/19/2025 0.5 mL Intramuscular    Site: Deltoid- Left    Lot: [id number]    NDC: 4953-9562-05

## 2025-06-19 NOTE — PATIENT INSTRUCTIONS
heart attack happens when blood flow is completely blocked. A high-fat diet, smoking, and other factors increase the risk of heart disease.  Your doctor has found that you have a chance of having heart disease. A heart-healthy lifestyle can help keep your heart healthy and prevent heart disease. This lifestyle includes eating healthy, being active, staying at a weight that's healthy for you, and not smoking or using tobacco. It also includes taking medicines as directed, managing other health conditions, and trying to get a healthy amount of sleep.  Follow-up care is a key part of your treatment and safety. Be sure to make and go to all appointments, and call your doctor if you are having problems. It's also a good idea to know your test results and keep a list of the medicines you take.  How can you care for yourself at home?  Diet    Use less salt when you cook and eat. This helps lower your blood pressure. Taste food before salting. Add only a little salt when you think you need it. With time, your taste buds will adjust to less salt.     Eat fewer snack items, fast foods, canned soups, and other high-salt, high-fat, processed foods.     Read food labels and try to avoid saturated and trans fats. They increase your risk of heart disease by raising cholesterol levels.     Limit the amount of solid fat--butter, margarine, and shortening--you eat. Use olive, peanut, or canola oil when you cook. Bake, broil, and steam foods instead of frying them.     Eat a variety of fruit and vegetables every day. Dark green, deep orange, red, or yellow fruits and vegetables are especially good for you. Examples include spinach, carrots, peaches, and berries.     Foods high in fiber can reduce your cholesterol and provide important vitamins and minerals. High-fiber foods include whole-grain cereals and breads, oatmeal, beans, brown rice, citrus fruits, and apples.     Eat lean proteins. Heart-healthy proteins include seafood, lean

## 2025-06-19 NOTE — PROGRESS NOTES
Medicare Annual Wellness Visit    Keke Lee is here for Medicare AWV    Assessment & Plan   Need for prophylactic vaccination against Streptococcus pneumoniae (pneumococcus)  Hypothyroidism, unspecified type       No follow-ups on file.     Subjective   The following acute and/or chronic problems were also addressed today:  She gets the right ring trigger finger daily but she can straighten it without pulling.  She doesn't want mamm, dexa or colonoscopy  She sees the CHF clinic    Patient's complete Health Risk Assessment and screening values have been reviewed and are found in Flowsheets. The following problems were reviewed today and where indicated follow up appointments were made and/or referrals ordered.    Positive Risk Factor Screenings with Interventions:     Cognitive:   Clock Drawing Test (CDT): (!) Abnormal  Words recalled: 2 Words Recalled  Total Score: (!) 2  Total Score Interpretation: Abnormal Mini-Cog  Interventions:  She does well            Inactivity:  On average, how many days per week do you engage in moderate to strenuous exercise (like a brisk walk)?: (Patient-Rptd) 1 day (!) Abnormal  On average, how many minutes do you engage in exercise at this level?: (Patient-Rptd) 10 min  Interventions:  She is walking the dog more     Abnormal BMI (obese):  Body mass index is 31.92 kg/m². (!) Abnormal  Interventions:  We reviewed the weight gain from last year.         Hearing Screen:  Do you or your family notice any trouble with your hearing that hasn't been managed with hearing aids?: (!) (Patient-Rptd) Yes    Interventions:  She uses on line ones.                     Objective   Vitals:    06/19/25 0943   BP: (!) 98/52   BP Site: Right Upper Arm   Patient Position: Sitting   BP Cuff Size: Medium Adult   Pulse: 68   Resp: 16   Weight: 79.2 kg (174 lb 8 oz)   Height: 1.575 m (5' 2\")      Body mass index is 31.92 kg/m².      General Appearance: alert and oriented to person, place and time,

## 2025-06-23 ENCOUNTER — HOSPITAL ENCOUNTER (OUTPATIENT)
Age: 80
Discharge: HOME | End: 2025-06-23
Payer: MEDICARE

## 2025-06-23 DIAGNOSIS — E78.00 PURE HYPERCHOLESTEROLEMIA: ICD-10-CM

## 2025-06-23 DIAGNOSIS — R00.1 SINUS BRADYCARDIA: ICD-10-CM

## 2025-06-23 DIAGNOSIS — Z98.890 S/P CARDIAC CATH: ICD-10-CM

## 2025-06-23 DIAGNOSIS — Z86.79 S/P ABLATION OF ATRIAL FLUTTER: ICD-10-CM

## 2025-06-23 DIAGNOSIS — I50.32 CHRONIC DIASTOLIC CONGESTIVE HEART FAILURE (HCC): ICD-10-CM

## 2025-06-23 DIAGNOSIS — I48.0 PAROXYSMAL ATRIAL FIBRILLATION (HCC): ICD-10-CM

## 2025-06-23 DIAGNOSIS — Z98.890 S/P ABLATION OF ATRIAL FLUTTER: ICD-10-CM

## 2025-06-23 DIAGNOSIS — I42.8 NONISCHEMIC CARDIOMYOPATHY (HCC): ICD-10-CM

## 2025-06-23 LAB
ALBUMIN SERPL BCG-MCNC: 4.2 G/DL (ref 3.4–4.9)
ALP SERPL-CCNC: 90 U/L (ref 38–126)
ALT SERPL W/O P-5'-P-CCNC: 12 U/L (ref 10–35)
AST SERPL-CCNC: 18 U/L (ref 10–35)
BILIRUB CONJ SERPL-MCNC: 0.2 MG/DL (ref 0–0.2)
BILIRUB SERPL-MCNC: 0.6 MG/DL (ref 0.3–1.2)
CHOLEST SERPL-MCNC: 155 MG/DL (ref 100–199)
HDLC SERPL-MCNC: 57 MG/DL
LDLC SERPL CALC-MCNC: 72 MG/DL
PROT SERPL-MCNC: 6.9 G/DL (ref 6.4–8.3)
TRIGL SERPL-MCNC: 132 MG/DL (ref 0–199)

## 2025-06-23 PROCEDURE — 36415 COLL VENOUS BLD VENIPUNCTURE: CPT

## 2025-06-23 PROCEDURE — 80061 LIPID PANEL: CPT

## 2025-06-23 PROCEDURE — 80076 HEPATIC FUNCTION PANEL: CPT

## 2025-07-01 RX ORDER — CARVEDILOL 6.25 MG/1
6.25 TABLET ORAL 2 TIMES DAILY
Qty: 180 TABLET | Refills: 1 | Status: SHIPPED | OUTPATIENT
Start: 2025-07-01

## 2025-07-14 DIAGNOSIS — E78.00 PURE HYPERCHOLESTEROLEMIA: ICD-10-CM

## 2025-07-14 RX ORDER — ROSUVASTATIN CALCIUM 20 MG/1
20 TABLET, COATED ORAL DAILY
Qty: 90 TABLET | Refills: 1 | Status: SHIPPED | OUTPATIENT
Start: 2025-07-14

## 2025-07-28 DIAGNOSIS — F41.9 ANXIETY DISORDER, UNSPECIFIED TYPE: ICD-10-CM

## 2025-07-28 RX ORDER — SERTRALINE HYDROCHLORIDE 25 MG/1
25 TABLET, FILM COATED ORAL DAILY
Qty: 90 TABLET | Refills: 3 | Status: SHIPPED | OUTPATIENT
Start: 2025-07-28

## 2025-07-28 NOTE — TELEPHONE ENCOUNTER
Date of last visit:  6/19/2025  Date of next visit:  Visit date not found    Requested Prescriptions     Pending Prescriptions Disp Refills    sertraline (ZOLOFT) 25 MG tablet 90 tablet 3     Sig: Take 1 tablet by mouth daily

## 2025-08-04 RX ORDER — ASPIRIN 81 MG/1
TABLET, CHEWABLE ORAL
Qty: 90 TABLET | Refills: 1 | Status: SHIPPED | OUTPATIENT
Start: 2025-08-04

## (undated) DEVICE — PINNACLE INTRODUCER SHEATH: Brand: PINNACLE

## (undated) DEVICE — NEEDLE PROC AD 18GA L798CM 50DEG S STL TRANSSEPTAL POINTER

## (undated) DEVICE — PATCH REF EXT FOR CARTO 3 SYS (EA = 6 PACKS)

## (undated) DEVICE — TUBING PMP FOR CARTO SYS SMARTABLATE

## (undated) DEVICE — EP RECORDING CATHETER 14 POLE, FIXED CURVE: Brand: EP RECORDING CATHETER

## (undated) DEVICE — CATHETER MAP D CRV 2-2-2-2-2 MM SPC PERSEID OCTARAY

## (undated) DEVICE — SYSTEM CLOSURE 6-12 FR VEN VASC VASCADE MVP

## (undated) DEVICE — 4F (1.0MM ID) X 9CM STIFF MICRO-STICK® INTRODUCER SET WITH NITINOL GUIDEWIRE WITH RADIOPAQUE TIP: Brand: MICRO-STICK SETMICRO-STICK SET

## (undated) DEVICE — CATHETER US GE COMPATIBILITY SOUNDSTAR ECO 10FR

## (undated) DEVICE — CATHETER ABLAT 8FR L115CM 1-6-2MM SPC TIP 3.5MM DF CRV

## (undated) DEVICE — SHEATH GUID 11.5X8.5FR L71MM M CRV L22MM BIDIR STEER CARTO

## (undated) DEVICE — PROBE TEMP ESOPH MULT LEVEL SENSOR SENSITHERM